# Patient Record
Sex: MALE | Race: WHITE | NOT HISPANIC OR LATINO
[De-identification: names, ages, dates, MRNs, and addresses within clinical notes are randomized per-mention and may not be internally consistent; named-entity substitution may affect disease eponyms.]

---

## 2017-02-23 ENCOUNTER — RX RENEWAL (OUTPATIENT)
Age: 77
End: 2017-02-23

## 2017-04-22 ENCOUNTER — RX RENEWAL (OUTPATIENT)
Age: 77
End: 2017-04-22

## 2017-04-27 ENCOUNTER — APPOINTMENT (OUTPATIENT)
Dept: INTERNAL MEDICINE | Facility: CLINIC | Age: 77
End: 2017-04-27

## 2017-04-27 VITALS — WEIGHT: 195 LBS | BODY MASS INDEX: 31.47 KG/M2

## 2017-04-28 LAB
25(OH)D3 SERPL-MCNC: 53.9 NG/ML
ALBUMIN SERPL ELPH-MCNC: 4.4 G/DL
ALP BLD-CCNC: 68 U/L
ALT SERPL-CCNC: 11 U/L
ANION GAP SERPL CALC-SCNC: 15 MMOL/L
AST SERPL-CCNC: 8 U/L
BASOPHILS # BLD AUTO: 0.06 K/UL
BASOPHILS NFR BLD AUTO: 0.8 %
BILIRUB SERPL-MCNC: 0.3 MG/DL
BUN SERPL-MCNC: 36 MG/DL
CALCIUM SERPL-MCNC: 9.5 MG/DL
CHLORIDE SERPL-SCNC: 99 MMOL/L
CHOLEST SERPL-MCNC: 123 MG/DL
CHOLEST/HDLC SERPL: 3.6 RATIO
CO2 SERPL-SCNC: 30 MMOL/L
CREAT SERPL-MCNC: 1.46 MG/DL
EOSINOPHIL # BLD AUTO: 0.3 K/UL
EOSINOPHIL NFR BLD AUTO: 4.1 %
GLUCOSE SERPL-MCNC: 118 MG/DL
HBA1C MFR BLD HPLC: 6.7 %
HCT VFR BLD CALC: 40 %
HDLC SERPL-MCNC: 34 MG/DL
HGB BLD-MCNC: 12.2 G/DL
IMM GRANULOCYTES NFR BLD AUTO: 0.1 %
LDLC SERPL CALC-MCNC: 48 MG/DL
LYMPHOCYTES # BLD AUTO: 2.08 K/UL
LYMPHOCYTES NFR BLD AUTO: 28.4 %
MAN DIFF?: NORMAL
MCHC RBC-ENTMCNC: 28 PG
MCHC RBC-ENTMCNC: 30.5 GM/DL
MCV RBC AUTO: 92 FL
MONOCYTES # BLD AUTO: 0.6 K/UL
MONOCYTES NFR BLD AUTO: 8.2 %
NEUTROPHILS # BLD AUTO: 4.28 K/UL
NEUTROPHILS NFR BLD AUTO: 58.4 %
PLATELET # BLD AUTO: 183 K/UL
POTASSIUM SERPL-SCNC: 4.2 MMOL/L
PROT SERPL-MCNC: 6.7 G/DL
RBC # BLD: 4.35 M/UL
RBC # FLD: 13.9 %
SODIUM SERPL-SCNC: 144 MMOL/L
TRIGL SERPL-MCNC: 207 MG/DL
URATE SERPL-MCNC: 6.8 MG/DL
WBC # FLD AUTO: 7.33 K/UL

## 2017-04-29 ENCOUNTER — RX RENEWAL (OUTPATIENT)
Age: 77
End: 2017-04-29

## 2017-05-10 ENCOUNTER — RX RENEWAL (OUTPATIENT)
Age: 77
End: 2017-05-10

## 2017-05-30 ENCOUNTER — APPOINTMENT (OUTPATIENT)
Dept: INTERNAL MEDICINE | Facility: CLINIC | Age: 77
End: 2017-05-30

## 2017-05-30 VITALS
RESPIRATION RATE: 16 BRPM | TEMPERATURE: 98.2 F | DIASTOLIC BLOOD PRESSURE: 74 MMHG | HEART RATE: 66 BPM | SYSTOLIC BLOOD PRESSURE: 142 MMHG

## 2017-05-30 VITALS — BODY MASS INDEX: 32.28 KG/M2 | WEIGHT: 200 LBS

## 2017-05-30 DIAGNOSIS — G62.9 POLYNEUROPATHY, UNSPECIFIED: ICD-10-CM

## 2017-05-31 LAB
ALBUMIN SERPL ELPH-MCNC: 4.3 G/DL
ALP BLD-CCNC: 78 U/L
ALT SERPL-CCNC: 15 U/L
ANION GAP SERPL CALC-SCNC: 16 MMOL/L
AST SERPL-CCNC: 11 U/L
BILIRUB SERPL-MCNC: 0.2 MG/DL
BUN SERPL-MCNC: 32 MG/DL
CALCIUM SERPL-MCNC: 9.5 MG/DL
CHLORIDE SERPL-SCNC: 103 MMOL/L
CO2 SERPL-SCNC: 27 MMOL/L
CREAT SERPL-MCNC: 1.38 MG/DL
GLUCOSE SERPL-MCNC: 99 MG/DL
HBA1C MFR BLD HPLC: 7.2 %
POTASSIUM SERPL-SCNC: 4.3 MMOL/L
PROT SERPL-MCNC: 7.3 G/DL
SODIUM SERPL-SCNC: 146 MMOL/L

## 2017-07-12 ENCOUNTER — TRANSCRIPTION ENCOUNTER (OUTPATIENT)
Age: 77
End: 2017-07-12

## 2017-07-12 ENCOUNTER — APPOINTMENT (OUTPATIENT)
Dept: INTERNAL MEDICINE | Facility: CLINIC | Age: 77
End: 2017-07-12

## 2017-07-12 VITALS
BODY MASS INDEX: 32.12 KG/M2 | SYSTOLIC BLOOD PRESSURE: 132 MMHG | TEMPERATURE: 96.5 F | HEART RATE: 66 BPM | DIASTOLIC BLOOD PRESSURE: 78 MMHG | RESPIRATION RATE: 16 BRPM | WEIGHT: 199 LBS

## 2017-07-28 ENCOUNTER — APPOINTMENT (OUTPATIENT)
Dept: INTERNAL MEDICINE | Facility: CLINIC | Age: 77
End: 2017-07-28
Payer: MEDICARE

## 2017-07-28 VITALS — WEIGHT: 199.51 LBS | BODY MASS INDEX: 32.2 KG/M2

## 2017-07-28 VITALS
SYSTOLIC BLOOD PRESSURE: 128 MMHG | HEART RATE: 64 BPM | RESPIRATION RATE: 16 BRPM | TEMPERATURE: 97.6 F | DIASTOLIC BLOOD PRESSURE: 76 MMHG

## 2017-07-28 PROCEDURE — 36415 COLL VENOUS BLD VENIPUNCTURE: CPT

## 2017-07-28 PROCEDURE — 99215 OFFICE O/P EST HI 40 MIN: CPT | Mod: 25

## 2017-07-29 LAB
25(OH)D3 SERPL-MCNC: 49.4 NG/ML
ALBUMIN SERPL ELPH-MCNC: 4.2 G/DL
ALP BLD-CCNC: 64 U/L
ALT SERPL-CCNC: 19 U/L
ANION GAP SERPL CALC-SCNC: 15 MMOL/L
AST SERPL-CCNC: 18 U/L
BASOPHILS # BLD AUTO: 0.03 K/UL
BASOPHILS NFR BLD AUTO: 0.4 %
BILIRUB SERPL-MCNC: 0.4 MG/DL
BUN SERPL-MCNC: 35 MG/DL
CALCIUM SERPL-MCNC: 8.7 MG/DL
CHLORIDE SERPL-SCNC: 103 MMOL/L
CHOLEST SERPL-MCNC: 117 MG/DL
CHOLEST/HDLC SERPL: 3.4 RATIO
CO2 SERPL-SCNC: 27 MMOL/L
CREAT SERPL-MCNC: 1.49 MG/DL
CREAT SPEC-SCNC: 59 MG/DL
EOSINOPHIL # BLD AUTO: 0.3 K/UL
EOSINOPHIL NFR BLD AUTO: 4.3 %
GLUCOSE SERPL-MCNC: 119 MG/DL
HBA1C MFR BLD HPLC: 6.7 %
HCT VFR BLD CALC: 38.8 %
HDLC SERPL-MCNC: 34 MG/DL
HGB BLD-MCNC: 12.1 G/DL
IMM GRANULOCYTES NFR BLD AUTO: 0.3 %
LDLC SERPL CALC-MCNC: 47 MG/DL
LYMPHOCYTES # BLD AUTO: 2.1 K/UL
LYMPHOCYTES NFR BLD AUTO: 30.3 %
MAN DIFF?: NORMAL
MCHC RBC-ENTMCNC: 28.2 PG
MCHC RBC-ENTMCNC: 31.2 GM/DL
MCV RBC AUTO: 90.4 FL
MICROALBUMIN 24H UR DL<=1MG/L-MCNC: 0.5 MG/DL
MICROALBUMIN/CREAT 24H UR-RTO: 8 MG/G
MONOCYTES # BLD AUTO: 0.4 K/UL
MONOCYTES NFR BLD AUTO: 5.8 %
NEUTROPHILS # BLD AUTO: 4.09 K/UL
NEUTROPHILS NFR BLD AUTO: 58.9 %
PLATELET # BLD AUTO: 182 K/UL
POTASSIUM SERPL-SCNC: 4.2 MMOL/L
PROT SERPL-MCNC: 7.2 G/DL
RBC # BLD: 4.29 M/UL
RBC # FLD: 13.7 %
SODIUM SERPL-SCNC: 145 MMOL/L
TRIGL SERPL-MCNC: 181 MG/DL
URATE SERPL-MCNC: 6.1 MG/DL
WBC # FLD AUTO: 6.94 K/UL

## 2017-10-27 ENCOUNTER — APPOINTMENT (OUTPATIENT)
Dept: INTERNAL MEDICINE | Facility: CLINIC | Age: 77
End: 2017-10-27

## 2017-11-07 ENCOUNTER — APPOINTMENT (OUTPATIENT)
Dept: INTERNAL MEDICINE | Facility: CLINIC | Age: 77
End: 2017-11-07
Payer: MEDICARE

## 2017-11-07 VITALS — WEIGHT: 196 LBS | BODY MASS INDEX: 31.64 KG/M2

## 2017-11-07 VITALS
SYSTOLIC BLOOD PRESSURE: 128 MMHG | DIASTOLIC BLOOD PRESSURE: 72 MMHG | HEART RATE: 68 BPM | TEMPERATURE: 98.3 F | RESPIRATION RATE: 16 BRPM

## 2017-11-07 DIAGNOSIS — Z23 ENCOUNTER FOR IMMUNIZATION: ICD-10-CM

## 2017-11-07 DIAGNOSIS — M54.12 RADICULOPATHY, CERVICAL REGION: ICD-10-CM

## 2017-11-07 LAB
BILIRUB UR QL STRIP: NEGATIVE
GLUCOSE UR-MCNC: NEGATIVE
HCG UR QL: 0.2 EU/DL
HGB UR QL STRIP.AUTO: NORMAL
KETONES UR-MCNC: NEGATIVE
LEUKOCYTE ESTERASE UR QL STRIP: NEGATIVE
NITRITE UR QL STRIP: NEGATIVE
PH UR STRIP: 5.5
PROT UR STRIP-MCNC: NEGATIVE
SP GR UR STRIP: 1

## 2017-11-07 PROCEDURE — G0008: CPT

## 2017-11-07 PROCEDURE — 81003 URINALYSIS AUTO W/O SCOPE: CPT | Mod: QW

## 2017-11-07 PROCEDURE — G0439: CPT

## 2017-11-07 PROCEDURE — 90662 IIV NO PRSV INCREASED AG IM: CPT

## 2017-11-07 PROCEDURE — 36415 COLL VENOUS BLD VENIPUNCTURE: CPT

## 2017-11-07 PROCEDURE — 99215 OFFICE O/P EST HI 40 MIN: CPT | Mod: 25

## 2017-11-08 LAB
25(OH)D3 SERPL-MCNC: 62.8 NG/ML
ALBUMIN SERPL ELPH-MCNC: 4.3 G/DL
ALP BLD-CCNC: 67 U/L
ALT SERPL-CCNC: 11 U/L
ANION GAP SERPL CALC-SCNC: 14 MMOL/L
AST SERPL-CCNC: 16 U/L
BASOPHILS # BLD AUTO: 0.03 K/UL
BASOPHILS NFR BLD AUTO: 0.5 %
BILIRUB SERPL-MCNC: 0.3 MG/DL
BUN SERPL-MCNC: 43 MG/DL
CALCIUM SERPL-MCNC: 9.2 MG/DL
CHLORIDE SERPL-SCNC: 99 MMOL/L
CHOLEST SERPL-MCNC: 120 MG/DL
CHOLEST/HDLC SERPL: 3.8 RATIO
CO2 SERPL-SCNC: 28 MMOL/L
CREAT SERPL-MCNC: 1.66 MG/DL
CREAT SPEC-SCNC: 40 MG/DL
EOSINOPHIL # BLD AUTO: 0.3 K/UL
EOSINOPHIL NFR BLD AUTO: 4.7 %
GLUCOSE SERPL-MCNC: 126 MG/DL
HBA1C MFR BLD HPLC: 6.4 %
HCT VFR BLD CALC: 39.6 %
HDLC SERPL-MCNC: 32 MG/DL
HGB BLD-MCNC: 12.4 G/DL
IMM GRANULOCYTES NFR BLD AUTO: 0.2 %
LDLC SERPL CALC-MCNC: 41 MG/DL
LYMPHOCYTES # BLD AUTO: 1.77 K/UL
LYMPHOCYTES NFR BLD AUTO: 27.7 %
MAN DIFF?: NORMAL
MCHC RBC-ENTMCNC: 28.4 PG
MCHC RBC-ENTMCNC: 31.3 GM/DL
MCV RBC AUTO: 90.6 FL
MICROALBUMIN 24H UR DL<=1MG/L-MCNC: 1.1 MG/DL
MICROALBUMIN/CREAT 24H UR-RTO: 28 MG/G
MONOCYTES # BLD AUTO: 0.52 K/UL
MONOCYTES NFR BLD AUTO: 8.1 %
NEUTROPHILS # BLD AUTO: 3.77 K/UL
NEUTROPHILS NFR BLD AUTO: 58.8 %
PLATELET # BLD AUTO: 178 K/UL
POTASSIUM SERPL-SCNC: 4.5 MMOL/L
PROT SERPL-MCNC: 7.3 G/DL
RBC # BLD: 4.37 M/UL
RBC # FLD: 13.7 %
SODIUM SERPL-SCNC: 141 MMOL/L
TRIGL SERPL-MCNC: 234 MG/DL
URATE SERPL-MCNC: 7.1 MG/DL
WBC # FLD AUTO: 6.4 K/UL

## 2017-11-14 ENCOUNTER — MEDICATION RENEWAL (OUTPATIENT)
Age: 77
End: 2017-11-14

## 2018-01-11 ENCOUNTER — RX RENEWAL (OUTPATIENT)
Age: 78
End: 2018-01-11

## 2018-02-07 ENCOUNTER — APPOINTMENT (OUTPATIENT)
Dept: INTERNAL MEDICINE | Facility: CLINIC | Age: 78
End: 2018-02-07
Payer: MEDICARE

## 2018-02-07 VITALS
TEMPERATURE: 98.3 F | RESPIRATION RATE: 16 BRPM | HEART RATE: 70 BPM | DIASTOLIC BLOOD PRESSURE: 72 MMHG | SYSTOLIC BLOOD PRESSURE: 128 MMHG

## 2018-02-07 VITALS — WEIGHT: 190 LBS | BODY MASS INDEX: 30.67 KG/M2

## 2018-02-07 DIAGNOSIS — I83.813 VARICOSE VEINS OF BILATERAL LOWER EXTREMITIES WITH PAIN: ICD-10-CM

## 2018-02-07 DIAGNOSIS — M54.32 SCIATICA, LEFT SIDE: ICD-10-CM

## 2018-02-07 DIAGNOSIS — M79.652 PAIN IN LEFT THIGH: ICD-10-CM

## 2018-02-07 LAB
BILIRUB UR QL STRIP: NEGATIVE
GLUCOSE UR-MCNC: NEGATIVE
HCG UR QL: 0.2 EU/DL
HGB UR QL STRIP.AUTO: NEGATIVE
KETONES UR-MCNC: NEGATIVE
LEUKOCYTE ESTERASE UR QL STRIP: NEGATIVE
NITRITE UR QL STRIP: NEGATIVE
PH UR STRIP: 5.5
PROT UR STRIP-MCNC: NEGATIVE
SP GR UR STRIP: 1.01

## 2018-02-07 PROCEDURE — 81003 URINALYSIS AUTO W/O SCOPE: CPT | Mod: QW

## 2018-02-07 PROCEDURE — 99215 OFFICE O/P EST HI 40 MIN: CPT | Mod: 25

## 2018-02-07 PROCEDURE — 36415 COLL VENOUS BLD VENIPUNCTURE: CPT

## 2018-02-07 RX ORDER — HYDROCODONE BITARTRATE AND ACETAMINOPHEN 5; 325 MG/1; MG/1
5-325 TABLET ORAL
Qty: 60 | Refills: 0 | Status: DISCONTINUED | COMMUNITY
Start: 2017-07-28 | End: 2018-02-07

## 2018-02-08 LAB
25(OH)D3 SERPL-MCNC: 71.7 NG/ML
ALBUMIN SERPL ELPH-MCNC: 4.2 G/DL
ALP BLD-CCNC: 56 U/L
ALT SERPL-CCNC: 16 U/L
ANION GAP SERPL CALC-SCNC: 14 MMOL/L
AST SERPL-CCNC: 14 U/L
BASOPHILS # BLD AUTO: 0.03 K/UL
BASOPHILS NFR BLD AUTO: 0.5 %
BILIRUB SERPL-MCNC: 0.4 MG/DL
BUN SERPL-MCNC: 40 MG/DL
CALCIUM SERPL-MCNC: 9.4 MG/DL
CHLORIDE SERPL-SCNC: 102 MMOL/L
CHOLEST SERPL-MCNC: 117 MG/DL
CHOLEST/HDLC SERPL: 3.3 RATIO
CO2 SERPL-SCNC: 28 MMOL/L
CREAT SERPL-MCNC: 1.78 MG/DL
CREAT SPEC-SCNC: 97 MG/DL
EOSINOPHIL # BLD AUTO: 0.28 K/UL
EOSINOPHIL NFR BLD AUTO: 4.2 %
GLUCOSE SERPL-MCNC: 119 MG/DL
HBA1C MFR BLD HPLC: 6.4 %
HCT VFR BLD CALC: 40.6 %
HDLC SERPL-MCNC: 36 MG/DL
HGB BLD-MCNC: 11.9 G/DL
IMM GRANULOCYTES NFR BLD AUTO: 0.2 %
LDLC SERPL CALC-MCNC: 60 MG/DL
LYMPHOCYTES # BLD AUTO: 1.77 K/UL
LYMPHOCYTES NFR BLD AUTO: 26.8 %
MAN DIFF?: NORMAL
MCHC RBC-ENTMCNC: 27.6 PG
MCHC RBC-ENTMCNC: 29.3 GM/DL
MCV RBC AUTO: 94.2 FL
MICROALBUMIN 24H UR DL<=1MG/L-MCNC: 1.8 MG/DL
MICROALBUMIN/CREAT 24H UR-RTO: 19 MG/G
MONOCYTES # BLD AUTO: 0.3 K/UL
MONOCYTES NFR BLD AUTO: 4.5 %
NEUTROPHILS # BLD AUTO: 4.21 K/UL
NEUTROPHILS NFR BLD AUTO: 63.8 %
PLATELET # BLD AUTO: 167 K/UL
POTASSIUM SERPL-SCNC: 4.2 MMOL/L
PROT SERPL-MCNC: 7 G/DL
RBC # BLD: 4.31 M/UL
RBC # FLD: 14.1 %
SODIUM SERPL-SCNC: 144 MMOL/L
TRIGL SERPL-MCNC: 107 MG/DL
URATE SERPL-MCNC: 6.6 MG/DL
WBC # FLD AUTO: 6.6 K/UL

## 2018-02-13 ENCOUNTER — RX RENEWAL (OUTPATIENT)
Age: 78
End: 2018-02-13

## 2018-04-14 ENCOUNTER — RX RENEWAL (OUTPATIENT)
Age: 78
End: 2018-04-14

## 2018-04-23 ENCOUNTER — RX RENEWAL (OUTPATIENT)
Age: 78
End: 2018-04-23

## 2018-04-25 ENCOUNTER — RX RENEWAL (OUTPATIENT)
Age: 78
End: 2018-04-25

## 2018-05-07 ENCOUNTER — APPOINTMENT (OUTPATIENT)
Dept: INTERNAL MEDICINE | Facility: CLINIC | Age: 78
End: 2018-05-07

## 2018-06-09 ENCOUNTER — RX RENEWAL (OUTPATIENT)
Age: 78
End: 2018-06-09

## 2018-06-14 ENCOUNTER — RX RENEWAL (OUTPATIENT)
Age: 78
End: 2018-06-14

## 2018-06-16 ENCOUNTER — RX RENEWAL (OUTPATIENT)
Age: 78
End: 2018-06-16

## 2018-06-23 ENCOUNTER — RX RENEWAL (OUTPATIENT)
Age: 78
End: 2018-06-23

## 2018-07-16 ENCOUNTER — APPOINTMENT (OUTPATIENT)
Dept: INTERNAL MEDICINE | Facility: CLINIC | Age: 78
End: 2018-07-16
Payer: MEDICARE

## 2018-07-16 VITALS
RESPIRATION RATE: 14 BRPM | HEART RATE: 72 BPM | DIASTOLIC BLOOD PRESSURE: 72 MMHG | WEIGHT: 189 LBS | BODY MASS INDEX: 30.51 KG/M2 | SYSTOLIC BLOOD PRESSURE: 120 MMHG | TEMPERATURE: 97.6 F

## 2018-07-16 DIAGNOSIS — I73.9 PERIPHERAL VASCULAR DISEASE, UNSPECIFIED: ICD-10-CM

## 2018-07-16 PROCEDURE — 99215 OFFICE O/P EST HI 40 MIN: CPT | Mod: 25

## 2018-07-16 PROCEDURE — 36415 COLL VENOUS BLD VENIPUNCTURE: CPT

## 2018-07-16 NOTE — ASSESSMENT
[FreeTextEntry1] : Diagnoses #1 hypertension,stable . Patient is advised to continue current medical treatment .\par #2 adult-onset diabetes.same treatment  and diet\par #3 Chronic renal insufficiency.cmp to lab\par #4 hyperlipidemia.To Continue same treatment and low fat diet\par #5 rule out liver toxicity due to chronic medication\par #6 low vitamin D, being supplemented\par #7 arthritis of left hip .Patient to think about operation  \par #8 bilateral ears cerumen .the patient is going to see his ENT \par #9 edema of feet due to  obesity, varicose veins and use  flomax. Patient reassured\par Plan .Patient to continue current medical treatment and low-salt ,low-fat, diabetic diet.  Return to the office after 2  months [Mediterranean diet recommended] : Mediterranean diet recommended

## 2018-07-16 NOTE — HISTORY OF PRESENT ILLNESS
[FreeTextEntry1] : Patient  comes for followup of his Chronic medical problems like hypertension, CRF , hyperlipidemia, , rule out liver toxicity ,AODM and  low vitamin D .Patient feels well without any chest pain, weakness, shortness of breath, paroxysmal nocturnal dyspnea, orthopnea.only with known  left hip  pain when he walks.Also patient has swelling of his lower legs and feet for several weeks.

## 2018-07-16 NOTE — PHYSICAL EXAM
[General Appearance - Alert] : alert [General Appearance - In No Acute Distress] : in no acute distress [General Appearance - Well Nourished] : well nourished [General Appearance - Well Developed] : well developed [General Appearance - Well-Appearing] : healthy appearing [Sclera] : the sclera and conjunctiva were normal [Extraocular Movements] : extraocular movements were intact [Strabismus] : no strabismus was seen [Optic Disc Abnormality] : the optic disc were normal in size and color [Outer Ear] : the ears and nose were normal in appearance [Examination Of The Oral Cavity] : the lips and gums were normal [Both Tympanic Membranes Were Examined] : both tympanic membranes were normal [Nasal Cavity] : the nasal mucosa and septum were normal [Oropharynx] : the oropharynx was normal [Neck Appearance] : the appearance of the neck was normal [Neck Cervical Mass (___cm)] : no neck mass was observed [Jugular Venous Distention Increased] : there was no jugular-venous distention [Thyroid Diffuse Enlargement] : the thyroid was not enlarged [Thyroid Nodule] : there were no palpable thyroid nodules [Respiration, Rhythm And Depth] : normal respiratory rhythm and effort [Auscultation Breath Sounds / Voice Sounds] : lungs were clear to auscultation bilaterally [Chest Palpation] : palpation of the chest revealed no abnormalities [Lungs Percussion] : the lungs were normal to percussion [Apical Impulse] : the apical impulse was normal [Heart Rate And Rhythm] : heart rate was normal and rhythm regular [Heart Sounds] : normal S1 and S2 [Heart Sounds Gallop] : no gallops [Murmurs] : no murmurs [Heart Sounds Pericardial Friction Rub] : no pericardial rub [Arterial Pulses Carotid] : carotid pulses were normal with no bruits [Abdominal Aorta] : the abdominal aorta was normal [Full Pulse] : the pedal pulses are present [Veins - Varicosity Changes] : there were no varicosital changes [Breast Appearance] : normal in appearance [Breast Palpation Mass] : no palpable masses [Breast Abnormal Lactation (Galactorrhea)] : no nipple discharge [Bowel Sounds] : normal bowel sounds [Abdomen Soft] : soft [Abdomen Tenderness] : non-tender [Abdomen Mass (___ Cm)] : no abdominal mass palpated [Cervical Lymph Nodes Enlarged Posterior Bilaterally] : posterior cervical [Cervical Lymph Nodes Enlarged Anterior Bilaterally] : anterior cervical [Supraclavicular Lymph Nodes Enlarged Bilaterally] : supraclavicular [Axillary Lymph Nodes Enlarged Bilaterally] : axillary [Femoral Lymph Nodes Enlarged Bilaterally] : femoral [Inguinal Lymph Nodes Enlarged Bilaterally] : inguinal [No CVA Tenderness] : no ~M costovertebral angle tenderness [No Spinal Tenderness] : no spinal tenderness [Abnormal Walk] : normal gait [Nail Clubbing] : no clubbing  or cyanosis of the fingernails [Musculoskeletal - Swelling] : no joint swelling seen [Motor Tone] : muscle strength and tone were normal [FreeTextEntry1] : pain -stifness of l hip ,external rotation [Skin Color & Pigmentation] : normal skin color and pigmentation [Skin Turgor] : normal skin turgor [] : no rash [Skin Lesions] : no skin lesions [Right Foot Was Examined] : right foot was examined [Left Foot Was Examined] : left foot was examined [Normal Appearance] : normal in appearance [Tenderness] : not tender [Erythema] : not erythematous [Normal in Appearance] : normal in appearance [Swelling] : not swollen [Normal] : normal [Deep Tendon Reflexes (DTR)] : deep tendon reflexes were 2+ and symmetric [Sensation] : the sensory exam was normal to light touch and pinprick [Motor Exam] : the motor exam was normal [No Focal Deficits] : no focal deficits [Oriented To Time, Place, And Person] : oriented to person, place, and time [Impaired Insight] : insight and judgment were intact [Affect] : the affect was normal [Mood] : the mood was normal [Over the Past 2 Weeks, Have You Felt Down, Depressed, or Hopeless?] : 1.) Over the past 2 weeks, have you felt down, depressed, or hopeless? No [Over the Past 2 Weeks, Have You Felt Little Interest or Pleasure Doing Things?] : 2.) Over the past 2 weeks, have you felt little interest or pleasure doing things? No

## 2018-07-17 LAB
25(OH)D3 SERPL-MCNC: 65.8 NG/ML
ALBUMIN SERPL ELPH-MCNC: 4.4 G/DL
ALP BLD-CCNC: 57 U/L
ALT SERPL-CCNC: 15 U/L
ANION GAP SERPL CALC-SCNC: 16 MMOL/L
AST SERPL-CCNC: 12 U/L
BASOPHILS # BLD AUTO: 0.03 K/UL
BASOPHILS NFR BLD AUTO: 0.5 %
BILIRUB SERPL-MCNC: 0.4 MG/DL
BUN SERPL-MCNC: 33 MG/DL
CALCIUM SERPL-MCNC: 9.1 MG/DL
CHLORIDE SERPL-SCNC: 99 MMOL/L
CHOLEST SERPL-MCNC: 101 MG/DL
CHOLEST/HDLC SERPL: 3.1 RATIO
CO2 SERPL-SCNC: 28 MMOL/L
CREAT SERPL-MCNC: 1.69 MG/DL
EOSINOPHIL # BLD AUTO: 0.28 K/UL
EOSINOPHIL NFR BLD AUTO: 4.4 %
GLUCOSE SERPL-MCNC: 105 MG/DL
HBA1C MFR BLD HPLC: 6.5 %
HCT VFR BLD CALC: 37.5 %
HDLC SERPL-MCNC: 33 MG/DL
HGB BLD-MCNC: 11.6 G/DL
IMM GRANULOCYTES NFR BLD AUTO: 0.3 %
LDLC SERPL CALC-MCNC: 42 MG/DL
LYMPHOCYTES # BLD AUTO: 2.01 K/UL
LYMPHOCYTES NFR BLD AUTO: 31.6 %
MAN DIFF?: NORMAL
MCHC RBC-ENTMCNC: 29 PG
MCHC RBC-ENTMCNC: 30.9 GM/DL
MCV RBC AUTO: 93.8 FL
MONOCYTES # BLD AUTO: 0.43 K/UL
MONOCYTES NFR BLD AUTO: 6.8 %
NEUTROPHILS # BLD AUTO: 3.59 K/UL
NEUTROPHILS NFR BLD AUTO: 56.4 %
PLATELET # BLD AUTO: 176 K/UL
POTASSIUM SERPL-SCNC: 4.3 MMOL/L
PROT SERPL-MCNC: 6.5 G/DL
RBC # BLD: 4 M/UL
RBC # FLD: 14.3 %
SODIUM SERPL-SCNC: 143 MMOL/L
TRIGL SERPL-MCNC: 128 MG/DL
URATE SERPL-MCNC: 6.3 MG/DL
WBC # FLD AUTO: 6.36 K/UL

## 2018-07-18 ENCOUNTER — RX RENEWAL (OUTPATIENT)
Age: 78
End: 2018-07-18

## 2018-07-21 ENCOUNTER — RX RENEWAL (OUTPATIENT)
Age: 78
End: 2018-07-21

## 2018-08-01 ENCOUNTER — RX RENEWAL (OUTPATIENT)
Age: 78
End: 2018-08-01

## 2018-08-02 ENCOUNTER — RX RENEWAL (OUTPATIENT)
Age: 78
End: 2018-08-02

## 2018-08-09 ENCOUNTER — RX RENEWAL (OUTPATIENT)
Age: 78
End: 2018-08-09

## 2018-09-28 ENCOUNTER — RX RENEWAL (OUTPATIENT)
Age: 78
End: 2018-09-28

## 2018-10-17 ENCOUNTER — APPOINTMENT (OUTPATIENT)
Dept: INTERNAL MEDICINE | Facility: CLINIC | Age: 78
End: 2018-10-17

## 2018-11-06 ENCOUNTER — LABORATORY RESULT (OUTPATIENT)
Age: 78
End: 2018-11-06

## 2018-11-06 ENCOUNTER — APPOINTMENT (OUTPATIENT)
Dept: INTERNAL MEDICINE | Facility: CLINIC | Age: 78
End: 2018-11-06
Payer: MEDICARE

## 2018-11-06 VITALS
BODY MASS INDEX: 30.37 KG/M2 | OXYGEN SATURATION: 97 % | HEART RATE: 68 BPM | WEIGHT: 189 LBS | HEIGHT: 66 IN | DIASTOLIC BLOOD PRESSURE: 82 MMHG | SYSTOLIC BLOOD PRESSURE: 132 MMHG | RESPIRATION RATE: 16 BRPM

## 2018-11-06 LAB
BILIRUB UR QL STRIP: NEGATIVE
CLARITY UR: CLEAR
COLLECTION METHOD: NORMAL
GLUCOSE UR-MCNC: NEGATIVE
HCG UR QL: 0.2 EU/DL
HGB UR QL STRIP.AUTO: NORMAL
KETONES UR-MCNC: NEGATIVE
LEUKOCYTE ESTERASE UR QL STRIP: NEGATIVE
NITRITE UR QL STRIP: NEGATIVE
PH UR STRIP: 6.5
PROT UR STRIP-MCNC: NEGATIVE
SP GR UR STRIP: 1.02

## 2018-11-06 PROCEDURE — 81003 URINALYSIS AUTO W/O SCOPE: CPT | Mod: QW

## 2018-11-06 PROCEDURE — 36415 COLL VENOUS BLD VENIPUNCTURE: CPT

## 2018-11-06 PROCEDURE — 99213 OFFICE O/P EST LOW 20 MIN: CPT | Mod: 25

## 2018-11-07 NOTE — HISTORY OF PRESENT ILLNESS
[FreeTextEntry8] : Patient presents for evaluation of one episode of hemoptysis and one episode of gross hematuria. Patient states he coughs everyday after removing appliance he wears at night. Patient usually coughs and yesterday he had one episode where he coughed up some blood.  Patient denies any fevers or chills. Patient denies any preceding URI's. Patient denies any smoking history. Patient takes baby aspirin. Patient denies taking coumadin or other blood thinners.

## 2018-11-07 NOTE — ASSESSMENT
[FreeTextEntry1] : 1) Hemoptysis: May be from bronchitis, chronic cough. Patient is not smoker and denies taking any blood thinners. Will check chest X-ray and evaluate liver function\par 2) Gross hematuria: Advised to go to urologist for further evaluation, will check kidney function, and liver function. May consider Goodpasture's syndrome given hematuria and hemoptysis. Will check UA and culture.

## 2018-11-09 ENCOUNTER — MESSAGE (OUTPATIENT)
Age: 78
End: 2018-11-09

## 2018-11-09 LAB
ALBUMIN SERPL ELPH-MCNC: 4.3 G/DL
ALP BLD-CCNC: 62 U/L
ALT SERPL-CCNC: 16 U/L
ANION GAP SERPL CALC-SCNC: 13 MMOL/L
APPEARANCE: ABNORMAL
APPEARANCE: ABNORMAL
AST SERPL-CCNC: 10 U/L
BACTERIA: NEGATIVE
BASOPHILS # BLD AUTO: 0.04 K/UL
BASOPHILS NFR BLD AUTO: 0.6 %
BILIRUB SERPL-MCNC: 0.5 MG/DL
BILIRUBIN URINE: NEGATIVE
BILIRUBIN URINE: NEGATIVE
BLOOD URINE: ABNORMAL
BLOOD URINE: ABNORMAL
BUN SERPL-MCNC: 39 MG/DL
CALCIUM SERPL-MCNC: 9.8 MG/DL
CHLORIDE SERPL-SCNC: 101 MMOL/L
CO2 SERPL-SCNC: 29 MMOL/L
COLOR: YELLOW
COLOR: YELLOW
CREAT SERPL-MCNC: 1.42 MG/DL
EOSINOPHIL # BLD AUTO: 0.21 K/UL
EOSINOPHIL NFR BLD AUTO: 3.2 %
GLUCOSE QUALITATIVE U: NEGATIVE MG/DL
GLUCOSE QUALITATIVE U: NEGATIVE MG/DL
GLUCOSE SERPL-MCNC: 100 MG/DL
HCT VFR BLD CALC: 39 %
HGB BLD-MCNC: 12.4 G/DL
HYALINE CASTS: 2 /LPF
IMM GRANULOCYTES NFR BLD AUTO: 0.3 %
KETONES URINE: NEGATIVE
KETONES URINE: NEGATIVE
LEUKOCYTE ESTERASE URINE: NEGATIVE
LEUKOCYTE ESTERASE URINE: NEGATIVE
LYMPHOCYTES # BLD AUTO: 1.94 K/UL
LYMPHOCYTES NFR BLD AUTO: 29.7 %
MAN DIFF?: NORMAL
MCHC RBC-ENTMCNC: 28.3 PG
MCHC RBC-ENTMCNC: 31.8 GM/DL
MCV RBC AUTO: 89 FL
MICROSCOPIC-UA: NORMAL
MONOCYTES # BLD AUTO: 0.36 K/UL
MONOCYTES NFR BLD AUTO: 5.5 %
NEUTROPHILS # BLD AUTO: 3.97 K/UL
NEUTROPHILS NFR BLD AUTO: 60.7 %
NITRITE URINE: NEGATIVE
NITRITE URINE: NEGATIVE
PH URINE: 6.5
PH URINE: 6.5
PLATELET # BLD AUTO: 173 K/UL
POTASSIUM SERPL-SCNC: 4.3 MMOL/L
PROT SERPL-MCNC: 7 G/DL
PROTEIN URINE: NEGATIVE MG/DL
PROTEIN URINE: NEGATIVE MG/DL
RBC # BLD: 4.38 M/UL
RBC # FLD: 13.9 %
RED BLOOD CELLS URINE: 4 /HPF
SODIUM SERPL-SCNC: 143 MMOL/L
SPECIFIC GRAVITY URINE: 1.02
SPECIFIC GRAVITY URINE: 1.02
SQUAMOUS EPITHELIAL CELLS: 4 /HPF
URINE COMMENTS: NORMAL
UROBILINOGEN URINE: NEGATIVE MG/DL
UROBILINOGEN URINE: NEGATIVE MG/DL
WBC # FLD AUTO: 6.54 K/UL
WHITE BLOOD CELLS URINE: 5 /HPF

## 2018-12-28 ENCOUNTER — RX RENEWAL (OUTPATIENT)
Age: 78
End: 2018-12-28

## 2019-01-09 ENCOUNTER — RX RENEWAL (OUTPATIENT)
Age: 79
End: 2019-01-09

## 2019-01-09 RX ORDER — FEBUXOSTAT 40 MG/1
40 TABLET ORAL DAILY
Qty: 90 | Refills: 2 | Status: DISCONTINUED | COMMUNITY
Start: 2018-08-10 | End: 2019-01-09

## 2019-01-21 ENCOUNTER — RX RENEWAL (OUTPATIENT)
Age: 79
End: 2019-01-21

## 2019-01-23 ENCOUNTER — MESSAGE (OUTPATIENT)
Age: 79
End: 2019-01-23

## 2019-01-26 ENCOUNTER — EMERGENCY (EMERGENCY)
Facility: HOSPITAL | Age: 79
LOS: 1 days | Discharge: HOME | End: 2019-01-26
Attending: EMERGENCY MEDICINE
Payer: MEDICARE

## 2019-01-26 VITALS
TEMPERATURE: 98 F | OXYGEN SATURATION: 96 % | DIASTOLIC BLOOD PRESSURE: 91 MMHG | SYSTOLIC BLOOD PRESSURE: 173 MMHG | WEIGHT: 184.97 LBS | HEIGHT: 66 IN | HEART RATE: 63 BPM | RESPIRATION RATE: 18 BRPM

## 2019-01-26 LAB
ANION GAP SERPL CALC-SCNC: 13 MMOL/L — SIGNIFICANT CHANGE UP (ref 5–17)
APPEARANCE UR: ABNORMAL
BACTERIA # UR AUTO: NEGATIVE — SIGNIFICANT CHANGE UP
BILIRUB UR-MCNC: NEGATIVE — SIGNIFICANT CHANGE UP
BUN SERPL-MCNC: 25 MG/DL — HIGH (ref 7–23)
CALCIUM SERPL-MCNC: 9.6 MG/DL — SIGNIFICANT CHANGE UP (ref 8.4–10.5)
CHLORIDE SERPL-SCNC: 98 MMOL/L — SIGNIFICANT CHANGE UP (ref 96–108)
CO2 SERPL-SCNC: 27 MMOL/L — SIGNIFICANT CHANGE UP (ref 22–31)
COLOR SPEC: SIGNIFICANT CHANGE UP
CREAT SERPL-MCNC: 1.41 MG/DL — HIGH (ref 0.5–1.3)
DIFF PNL FLD: ABNORMAL
EPI CELLS # UR: 2 /HPF — SIGNIFICANT CHANGE UP
GLUCOSE SERPL-MCNC: 107 MG/DL — HIGH (ref 70–99)
GLUCOSE UR QL: NEGATIVE — SIGNIFICANT CHANGE UP
HCT VFR BLD CALC: 36.3 % — LOW (ref 39–50)
HGB BLD-MCNC: 12.6 G/DL — LOW (ref 13–17)
HYALINE CASTS # UR AUTO: 2 /LPF — SIGNIFICANT CHANGE UP (ref 0–2)
KETONES UR-MCNC: NEGATIVE — SIGNIFICANT CHANGE UP
LEUKOCYTE ESTERASE UR-ACNC: NEGATIVE — SIGNIFICANT CHANGE UP
MCHC RBC-ENTMCNC: 29.6 PG — SIGNIFICANT CHANGE UP (ref 27–34)
MCHC RBC-ENTMCNC: 34.8 GM/DL — SIGNIFICANT CHANGE UP (ref 32–36)
MCV RBC AUTO: 85 FL — SIGNIFICANT CHANGE UP (ref 80–100)
NITRITE UR-MCNC: NEGATIVE — SIGNIFICANT CHANGE UP
PH UR: 7.5 — SIGNIFICANT CHANGE UP (ref 5–8)
PLATELET # BLD AUTO: 167 K/UL — SIGNIFICANT CHANGE UP (ref 150–400)
POTASSIUM SERPL-MCNC: 3.7 MMOL/L — SIGNIFICANT CHANGE UP (ref 3.5–5.3)
POTASSIUM SERPL-SCNC: 3.7 MMOL/L — SIGNIFICANT CHANGE UP (ref 3.5–5.3)
PROT UR-MCNC: ABNORMAL
RBC # BLD: 4.26 M/UL — SIGNIFICANT CHANGE UP (ref 4.2–5.8)
RBC # FLD: 11.7 % — SIGNIFICANT CHANGE UP (ref 10.3–14.5)
RBC CASTS # UR COMP ASSIST: 1493 /HPF — HIGH (ref 0–4)
SODIUM SERPL-SCNC: 138 MMOL/L — SIGNIFICANT CHANGE UP (ref 135–145)
SP GR SPEC: 1.01 — LOW (ref 1.01–1.02)
UROBILINOGEN FLD QL: NEGATIVE — SIGNIFICANT CHANGE UP
WBC # BLD: 7.2 K/UL — SIGNIFICANT CHANGE UP (ref 3.8–10.5)
WBC # FLD AUTO: 7.2 K/UL — SIGNIFICANT CHANGE UP (ref 3.8–10.5)
WBC UR QL: 13 /HPF — HIGH (ref 0–5)

## 2019-01-26 PROCEDURE — 99284 EMERGENCY DEPT VISIT MOD MDM: CPT

## 2019-01-26 PROCEDURE — 80048 BASIC METABOLIC PNL TOTAL CA: CPT

## 2019-01-26 PROCEDURE — 85027 COMPLETE CBC AUTOMATED: CPT

## 2019-01-26 PROCEDURE — 81001 URINALYSIS AUTO W/SCOPE: CPT

## 2019-01-26 PROCEDURE — 99283 EMERGENCY DEPT VISIT LOW MDM: CPT

## 2019-01-26 NOTE — ED PROVIDER NOTE - PHYSICAL EXAMINATION
Constitutional: Elderly man seen sitting in bed in NAD  Eyes: PERRL  ENMT: MMM  Neck: Supple  Respiratory: CTAB, no rales, rhonchi or wheezes  Cardiovascular: RRR, no m/g/r  Gastrointestinal: +BS, soft, NT/ND  Neurological: AAOx3  Psychiatric: Appropriate affect and mood  Extremities: No LE edema

## 2019-01-26 NOTE — ED ADULT NURSE NOTE - OBJECTIVE STATEMENT
79 y/o male hx HTN, BPH presents to the ED from home c/o hematuria x 2 days. Pt states that began suddenly at night, not associated with abdominal pain or dysuria. Denies fever, chills, n/v, weakness, abd pain, diarrhea/constipation, numbness/tingling, lethargy, dizziness, weakness. Pt A&Ox3, in no respiratory distress, no CP, abd soft, nontender, nondistended, urine red in color, no clots noted. PT safety maintained with family at bedside, call bell within reach and bed in the lowest position.

## 2019-01-26 NOTE — ED PROVIDER NOTE - PROGRESS NOTE DETAILS
UA negative for infection. Cr 1.4, stable from outpatient records after speaking with PCP. Plan for d/c home. Has outpatient urology f/u on Monday. Spoke to Dr. Lara states cr is stable, pt has follow up on monday, bladder scanner stable w positive urination in ED. [id number]

## 2019-01-26 NOTE — ED PROVIDER NOTE - NSFOLLOWUPCLINICS_GEN_ALL_ED_FT
[facility] - Urology  Urology  [address], [address] [place] [address]  Phone: [phone]  Fax:   Follow Up Time:

## 2019-01-26 NOTE — ED PROVIDER NOTE - OBJECTIVE STATEMENT
79 yo M with PMH HTN, BPH p/w hematuria. Pt endorses that yesterday early morning he noticed blood in his urine. He does not notice it except when he urinates and it has not changed in nature. He had an episode of very mild blood in his urine a couple of weeks ago but it resolved within a day. Today, he denies any symptoms other than hematuria which is fruit punch colored. He denies any other urinary symptoms including pain on urination, burning, abd pain, flank pain, fever or chills, decreased or increased urination or difficulty starting or stopping a stream.

## 2019-01-26 NOTE — ED PROVIDER NOTE - NSFOLLOWUPINSTRUCTIONS_ED_ALL_ED_FT
You came to the hospital with blood in your urine. We ruled out an infection or any kidney damage. You should follow-up at your scheduled urology appointment on Monday or you can follow-up with our urologists to further evaluate your bleeding. You should return to the ER if you start to urinate thick blood  or start to have difficulty urinating.

## 2019-01-26 NOTE — ED PROVIDER NOTE - ATTENDING CONTRIBUTION TO CARE
****ATTENDING**** 77yo male hx HTN, BPH presents with hematuria. States since yesterday he has noticed blood tinged urine. Denies any pain, n/v, testicular pain. Pt has had hematuria in past that was self limiting. Denies any difficulty with urination. Pt spoke to his doctor and has an appt on Monday w urology but wanted to make sure he was okay till than.  On exam, Patient is awake,alert,oriented x 3. Patient is well appearing and in no acute distress. Patient's chest is clear to ausculation, +s1s2. Abdomen is soft nd/nt +BS. Extremity with no swelling or calf tenderness.  + hx smoking.  Pt w normal flow of urination, check labs, ua.

## 2019-01-26 NOTE — ED PROVIDER NOTE - NS ED ROS FT
General: Denies dizziness, fatigue  Eyes: Denies blurry vision  ENMT: Denies rhinorrhea  Respiratory: Denies cough, SOB  Cardiovascular: Denies palpitations, CP  Gastrointestinal: Denies abd pain, N/V/D/C  : +hematuria; denies dysuria, increased freq  Musculoskeletal: Denies edema, joint pain  Endocrine: Denies increased thirst, increased frequency  Allergic/Immunologic: Denies rashes or hives  Neuro: Denies weakness, numbness

## 2019-02-04 ENCOUNTER — APPOINTMENT (OUTPATIENT)
Dept: INTERNAL MEDICINE | Facility: CLINIC | Age: 79
End: 2019-02-04

## 2019-02-15 ENCOUNTER — NON-APPOINTMENT (OUTPATIENT)
Age: 79
End: 2019-02-15

## 2019-02-15 ENCOUNTER — APPOINTMENT (OUTPATIENT)
Dept: INTERNAL MEDICINE | Facility: CLINIC | Age: 79
End: 2019-02-15
Payer: MEDICARE

## 2019-02-15 VITALS
HEART RATE: 74 BPM | OXYGEN SATURATION: 95 % | DIASTOLIC BLOOD PRESSURE: 90 MMHG | WEIGHT: 189.15 LBS | SYSTOLIC BLOOD PRESSURE: 161 MMHG | HEIGHT: 64.96 IN | TEMPERATURE: 97.6 F | BODY MASS INDEX: 31.52 KG/M2

## 2019-02-15 PROCEDURE — 99214 OFFICE O/P EST MOD 30 MIN: CPT

## 2019-02-16 NOTE — PHYSICAL EXAM
[No Acute Distress] : no acute distress [Well Nourished] : well nourished [Well Developed] : well developed [Well-Appearing] : well-appearing [Normal Sclera/Conjunctiva] : normal sclera/conjunctiva [PERRL] : pupils equal round and reactive to light [EOMI] : extraocular movements intact [Normal Outer Ear/Nose] : the outer ears and nose were normal in appearance [Normal Oropharynx] : the oropharynx was normal [Normal TMs] : both tympanic membranes were normal [Normal Nasal Mucosa] : the nasal mucosa was normal [No JVD] : no jugular venous distention [Supple] : supple [No Lymphadenopathy] : no lymphadenopathy [Thyroid Normal, No Nodules] : the thyroid was normal and there were no nodules present [No Respiratory Distress] : no respiratory distress  [Clear to Auscultation] : lungs were clear to auscultation bilaterally [No Accessory Muscle Use] : no accessory muscle use [Normal Rate] : normal rate  [Regular Rhythm] : with a regular rhythm [Normal S1, S2] : normal S1 and S2 [No Murmur] : no murmur heard [No Carotid Bruits] : no carotid bruits [No Abdominal Bruit] : a ~M bruit was not heard ~T in the abdomen [No Varicosities] : no varicosities [Pedal Pulses Present] : the pedal pulses are present [No Edema] : there was no peripheral edema [No Extremity Clubbing/Cyanosis] : no extremity clubbing/cyanosis [No Palpable Aorta] : no palpable aorta [Soft] : abdomen soft [Non Tender] : non-tender [Non-distended] : non-distended [No Masses] : no abdominal mass palpated [No HSM] : no HSM [Normal Bowel Sounds] : normal bowel sounds [Normal Supraclavicular Nodes] : no supraclavicular lymphadenopathy [Normal Axillary Nodes] : no axillary lymphadenopathy [Normal Posterior Cervical Nodes] : no posterior cervical lymphadenopathy [Normal Anterior Cervical Nodes] : no anterior cervical lymphadenopathy [Normal Inguinal Nodes] : no inguinal lymphadenopathy [No CVA Tenderness] : no CVA  tenderness [No Spinal Tenderness] : no spinal tenderness [No Joint Swelling] : no joint swelling [Grossly Normal Strength/Tone] : grossly normal strength/tone [No Rash] : no rash [No Skin Lesions] : no skin lesions [Normal Gait] : normal gait [Coordination Grossly Intact] : coordination grossly intact [No Focal Deficits] : no focal deficits [Speech Grossly Normal] : speech grossly normal [Normal Affect] : the affect was normal [Normal Insight/Judgement] : insight and judgment were intact

## 2019-02-20 NOTE — ASSESSMENT
[High Risk Surgery - Intraperitoneal, Intrathoracic or Supringuinal Vascular Procedures] : High Risk Surgery - Intraperitoneal, Intrathoracic or Supringuinal Vascular Procedures - No (0) [Ischemic Heart Disease] : Ischemic Heart Disease - No (0) [Congestive Heart Failure] : Congestive Heart Failure - No (0) [Prior Cerebrovascular Accident or TIA] : Prior Cerebrovascular Accident or TIA - No (0) [Creatinine >= 2mg/dL (1 Point)] : Creatinine >= 2mg/dL - No (0) [Insulin-dependent Diabetic (1 Point)] : Insulin-dependent Diabetic - No (0) [0] : 0 , RCRI Class: I, Risk of Post-Op Cardiac Complications: 0.4%, Procedure Risk: Low-Risk [FreeTextEntry4] : Patient presents for preoperative clearance. Blood pressure is elevated. Will increase enalapril to 40 mg daily and patient is to return in 4 days for BP check. Revised cardiac index places patient for low risk of any adverse cardiac events. Medical clearance pending normal blood preoperative testing and normal electrolytes.

## 2019-02-20 NOTE — HISTORY OF PRESENT ILLNESS
[No Pertinent Cardiac History] : no history of aortic stenosis, atrial fibrillation, coronary artery disease, recent myocardial infarction, or implantable device/pacemaker [No Pertinent Pulmonary History] : no history of asthma, COPD, sleep apnea, or smoking [No Adverse Anesthesia Reaction] : no adverse anesthesia reaction in self or family member [Chronic Kidney Disease] : chronic kidney disease [Diabetes] : diabetes [(Patient denies any chest pain, claudication, dyspnea on exertion, orthopnea, palpitations or syncope)] : Patient denies any chest pain, claudication, dyspnea on exertion, orthopnea, palpitations or syncope [Moderate (4-6 METs)] : Moderate (4-6 METs) [Chronic Anticoagulation] : no chronic anticoagulation [FreeTextEntry1] : Bladder Surgery [FreeTextEntry2] : 2/20/19 [FreeTextEntry4] : Patient presents for preoperative clearance for bladder surgery. Patient states that blood pressure was elevated during preoperative testing. Has been compliant with medications. Patient denies any chest pain, chest tightness, shortness of breath or palpitations. Patient denies any headache or lightheadedness.

## 2019-02-20 NOTE — ADDENDUM
[FreeTextEntry1] : Patient to return for blood pressure check, blood pressure today 142/72. Patient medically cleared pending normal preoperative blood work.\par \par \par preoperative labs reviewed patient medically optimized for surgery.

## 2019-03-18 ENCOUNTER — RX RENEWAL (OUTPATIENT)
Age: 79
End: 2019-03-18

## 2019-04-17 ENCOUNTER — RX RENEWAL (OUTPATIENT)
Age: 79
End: 2019-04-17

## 2019-05-06 ENCOUNTER — RX RENEWAL (OUTPATIENT)
Age: 79
End: 2019-05-06

## 2019-06-05 ENCOUNTER — RX RENEWAL (OUTPATIENT)
Age: 79
End: 2019-06-05

## 2019-06-11 ENCOUNTER — RX RENEWAL (OUTPATIENT)
Age: 79
End: 2019-06-11

## 2019-07-09 ENCOUNTER — APPOINTMENT (OUTPATIENT)
Dept: ORTHOPEDIC SURGERY | Facility: CLINIC | Age: 79
End: 2019-07-09

## 2019-07-31 ENCOUNTER — APPOINTMENT (OUTPATIENT)
Dept: INTERNAL MEDICINE | Facility: CLINIC | Age: 79
End: 2019-07-31

## 2019-07-31 ENCOUNTER — INPATIENT (INPATIENT)
Facility: HOSPITAL | Age: 79
LOS: 1 days | Discharge: HOME | DRG: 175 | End: 2019-08-02
Attending: INTERNAL MEDICINE | Admitting: STUDENT IN AN ORGANIZED HEALTH CARE EDUCATION/TRAINING PROGRAM
Payer: MEDICARE

## 2019-07-31 VITALS
OXYGEN SATURATION: 93 % | WEIGHT: 184.97 LBS | HEIGHT: 66 IN | DIASTOLIC BLOOD PRESSURE: 80 MMHG | SYSTOLIC BLOOD PRESSURE: 115 MMHG | HEART RATE: 71 BPM | RESPIRATION RATE: 18 BRPM | TEMPERATURE: 98 F

## 2019-07-31 DIAGNOSIS — R06.02 SHORTNESS OF BREATH: ICD-10-CM

## 2019-07-31 DIAGNOSIS — E78.5 HYPERLIPIDEMIA, UNSPECIFIED: ICD-10-CM

## 2019-07-31 DIAGNOSIS — J96.01 ACUTE RESPIRATORY FAILURE WITH HYPOXIA: ICD-10-CM

## 2019-07-31 DIAGNOSIS — N40.0 BENIGN PROSTATIC HYPERPLASIA WITHOUT LOWER URINARY TRACT SYMPTOMS: ICD-10-CM

## 2019-07-31 DIAGNOSIS — I48.91 UNSPECIFIED ATRIAL FIBRILLATION: ICD-10-CM

## 2019-07-31 DIAGNOSIS — R09.89 OTHER SPECIFIED SYMPTOMS AND SIGNS INVOLVING THE CIRCULATORY AND RESPIRATORY SYSTEMS: ICD-10-CM

## 2019-07-31 DIAGNOSIS — I10 ESSENTIAL (PRIMARY) HYPERTENSION: ICD-10-CM

## 2019-07-31 DIAGNOSIS — Z29.9 ENCOUNTER FOR PROPHYLACTIC MEASURES, UNSPECIFIED: ICD-10-CM

## 2019-07-31 DIAGNOSIS — C67.9 MALIGNANT NEOPLASM OF BLADDER, UNSPECIFIED: ICD-10-CM

## 2019-07-31 DIAGNOSIS — E11.9 TYPE 2 DIABETES MELLITUS WITHOUT COMPLICATIONS: ICD-10-CM

## 2019-07-31 DIAGNOSIS — C67.9 MALIGNANT NEOPLASM OF BLADDER, UNSPECIFIED: Chronic | ICD-10-CM

## 2019-07-31 DIAGNOSIS — N17.9 ACUTE KIDNEY FAILURE, UNSPECIFIED: ICD-10-CM

## 2019-07-31 DIAGNOSIS — M10.9 GOUT, UNSPECIFIED: ICD-10-CM

## 2019-07-31 LAB
ALBUMIN SERPL ELPH-MCNC: 3.9 G/DL — SIGNIFICANT CHANGE UP (ref 3.3–5)
ALP SERPL-CCNC: 66 U/L — SIGNIFICANT CHANGE UP (ref 40–120)
ALT FLD-CCNC: 28 U/L — SIGNIFICANT CHANGE UP (ref 10–45)
ANION GAP SERPL CALC-SCNC: 15 MMOL/L — SIGNIFICANT CHANGE UP (ref 5–17)
AST SERPL-CCNC: 33 U/L — SIGNIFICANT CHANGE UP (ref 10–40)
BASOPHILS # BLD AUTO: 0 K/UL — SIGNIFICANT CHANGE UP (ref 0–0.2)
BASOPHILS NFR BLD AUTO: 0.4 % — SIGNIFICANT CHANGE UP (ref 0–2)
BILIRUB SERPL-MCNC: 0.4 MG/DL — SIGNIFICANT CHANGE UP (ref 0.2–1.2)
BUN SERPL-MCNC: 44 MG/DL — HIGH (ref 7–23)
CALCIUM SERPL-MCNC: 9.3 MG/DL — SIGNIFICANT CHANGE UP (ref 8.4–10.5)
CHLORIDE SERPL-SCNC: 92 MMOL/L — LOW (ref 96–108)
CO2 SERPL-SCNC: 28 MMOL/L — SIGNIFICANT CHANGE UP (ref 22–31)
CREAT SERPL-MCNC: 2.02 MG/DL — HIGH (ref 0.5–1.3)
D DIMER BLD IA.RAPID-MCNC: 2078 NG/ML DDU — HIGH
EOSINOPHIL # BLD AUTO: 0.2 K/UL — SIGNIFICANT CHANGE UP (ref 0–0.5)
EOSINOPHIL NFR BLD AUTO: 2.8 % — SIGNIFICANT CHANGE UP (ref 0–6)
GLUCOSE SERPL-MCNC: 132 MG/DL — HIGH (ref 70–99)
HCT VFR BLD CALC: 35.6 % — LOW (ref 39–50)
HGB BLD-MCNC: 11.9 G/DL — LOW (ref 13–17)
LYMPHOCYTES # BLD AUTO: 1.7 K/UL — SIGNIFICANT CHANGE UP (ref 1–3.3)
LYMPHOCYTES # BLD AUTO: 27.6 % — SIGNIFICANT CHANGE UP (ref 13–44)
MCHC RBC-ENTMCNC: 28.4 PG — SIGNIFICANT CHANGE UP (ref 27–34)
MCHC RBC-ENTMCNC: 33.5 GM/DL — SIGNIFICANT CHANGE UP (ref 32–36)
MCV RBC AUTO: 84.7 FL — SIGNIFICANT CHANGE UP (ref 80–100)
MONOCYTES # BLD AUTO: 0.7 K/UL — SIGNIFICANT CHANGE UP (ref 0–0.9)
MONOCYTES NFR BLD AUTO: 11.6 % — SIGNIFICANT CHANGE UP (ref 2–14)
NEUTROPHILS # BLD AUTO: 3.6 K/UL — SIGNIFICANT CHANGE UP (ref 1.8–7.4)
NEUTROPHILS NFR BLD AUTO: 57.6 % — SIGNIFICANT CHANGE UP (ref 43–77)
NT-PROBNP SERPL-SCNC: 4769 PG/ML — HIGH (ref 0–300)
PLATELET # BLD AUTO: 152 K/UL — SIGNIFICANT CHANGE UP (ref 150–400)
POTASSIUM SERPL-MCNC: 4.2 MMOL/L — SIGNIFICANT CHANGE UP (ref 3.5–5.3)
POTASSIUM SERPL-SCNC: 4.2 MMOL/L — SIGNIFICANT CHANGE UP (ref 3.5–5.3)
PROT SERPL-MCNC: 6.9 G/DL — SIGNIFICANT CHANGE UP (ref 6–8.3)
RBC # BLD: 4.21 M/UL — SIGNIFICANT CHANGE UP (ref 4.2–5.8)
RBC # FLD: 12.5 % — SIGNIFICANT CHANGE UP (ref 10.3–14.5)
SODIUM SERPL-SCNC: 135 MMOL/L — SIGNIFICANT CHANGE UP (ref 135–145)
TROPONIN T, HIGH SENSITIVITY RESULT: 20 NG/L — SIGNIFICANT CHANGE UP (ref 0–51)
TROPONIN T, HIGH SENSITIVITY RESULT: 24 NG/L — SIGNIFICANT CHANGE UP (ref 0–51)
WBC # BLD: 6.2 K/UL — SIGNIFICANT CHANGE UP (ref 3.8–10.5)
WBC # FLD AUTO: 6.2 K/UL — SIGNIFICANT CHANGE UP (ref 3.8–10.5)

## 2019-07-31 PROCEDURE — 78582 LUNG VENTILAT&PERFUS IMAGING: CPT | Mod: 26

## 2019-07-31 PROCEDURE — 99223 1ST HOSP IP/OBS HIGH 75: CPT | Mod: AI,GC

## 2019-07-31 PROCEDURE — 93010 ELECTROCARDIOGRAM REPORT: CPT

## 2019-07-31 PROCEDURE — 93970 EXTREMITY STUDY: CPT | Mod: 26

## 2019-07-31 PROCEDURE — 71046 X-RAY EXAM CHEST 2 VIEWS: CPT | Mod: 26

## 2019-07-31 PROCEDURE — 99285 EMERGENCY DEPT VISIT HI MDM: CPT

## 2019-07-31 RX ORDER — SODIUM CHLORIDE 9 MG/ML
1000 INJECTION, SOLUTION INTRAVENOUS
Refills: 0 | Status: DISCONTINUED | OUTPATIENT
Start: 2019-07-31 | End: 2019-08-02

## 2019-07-31 RX ORDER — DEXTROSE 50 % IN WATER 50 %
25 SYRINGE (ML) INTRAVENOUS ONCE
Refills: 0 | Status: DISCONTINUED | OUTPATIENT
Start: 2019-07-31 | End: 2019-08-02

## 2019-07-31 RX ORDER — TAMSULOSIN HYDROCHLORIDE 0.4 MG/1
0.4 CAPSULE ORAL AT BEDTIME
Refills: 0 | Status: DISCONTINUED | OUTPATIENT
Start: 2019-07-31 | End: 2019-08-02

## 2019-07-31 RX ORDER — GLUCAGON INJECTION, SOLUTION 0.5 MG/.1ML
1 INJECTION, SOLUTION SUBCUTANEOUS ONCE
Refills: 0 | Status: DISCONTINUED | OUTPATIENT
Start: 2019-07-31 | End: 2019-08-02

## 2019-07-31 RX ORDER — INSULIN LISPRO 100/ML
VIAL (ML) SUBCUTANEOUS
Refills: 0 | Status: DISCONTINUED | OUTPATIENT
Start: 2019-07-31 | End: 2019-08-02

## 2019-07-31 RX ORDER — METOPROLOL TARTRATE 50 MG
25 TABLET ORAL
Refills: 0 | Status: DISCONTINUED | OUTPATIENT
Start: 2019-07-31 | End: 2019-08-02

## 2019-07-31 RX ORDER — DEXTROSE 50 % IN WATER 50 %
15 SYRINGE (ML) INTRAVENOUS ONCE
Refills: 0 | Status: DISCONTINUED | OUTPATIENT
Start: 2019-07-31 | End: 2019-08-02

## 2019-07-31 RX ORDER — FUROSEMIDE 40 MG
20 TABLET ORAL ONCE
Refills: 0 | Status: COMPLETED | OUTPATIENT
Start: 2019-07-31 | End: 2019-07-31

## 2019-07-31 RX ORDER — DEXTROSE 50 % IN WATER 50 %
12.5 SYRINGE (ML) INTRAVENOUS ONCE
Refills: 0 | Status: DISCONTINUED | OUTPATIENT
Start: 2019-07-31 | End: 2019-08-02

## 2019-07-31 RX ORDER — INSULIN LISPRO 100/ML
VIAL (ML) SUBCUTANEOUS AT BEDTIME
Refills: 0 | Status: DISCONTINUED | OUTPATIENT
Start: 2019-07-31 | End: 2019-08-02

## 2019-07-31 RX ORDER — ENOXAPARIN SODIUM 100 MG/ML
80 INJECTION SUBCUTANEOUS
Refills: 0 | Status: DISCONTINUED | OUTPATIENT
Start: 2019-07-31 | End: 2019-08-02

## 2019-07-31 RX ORDER — ASPIRIN/CALCIUM CARB/MAGNESIUM 324 MG
81 TABLET ORAL DAILY
Refills: 0 | Status: DISCONTINUED | OUTPATIENT
Start: 2019-07-31 | End: 2019-08-02

## 2019-07-31 RX ORDER — SIMVASTATIN 20 MG/1
20 TABLET, FILM COATED ORAL AT BEDTIME
Refills: 0 | Status: DISCONTINUED | OUTPATIENT
Start: 2019-07-31 | End: 2019-08-02

## 2019-07-31 RX ORDER — FINASTERIDE 5 MG/1
5 TABLET, FILM COATED ORAL DAILY
Refills: 0 | Status: DISCONTINUED | OUTPATIENT
Start: 2019-07-31 | End: 2019-08-02

## 2019-07-31 RX ORDER — ENOXAPARIN SODIUM 100 MG/ML
80 INJECTION SUBCUTANEOUS ONCE
Refills: 0 | Status: COMPLETED | OUTPATIENT
Start: 2019-07-31 | End: 2019-07-31

## 2019-07-31 RX ADMIN — ENOXAPARIN SODIUM 80 MILLIGRAM(S): 100 INJECTION SUBCUTANEOUS at 18:58

## 2019-07-31 RX ADMIN — ENOXAPARIN SODIUM 80 MILLIGRAM(S): 100 INJECTION SUBCUTANEOUS at 06:30

## 2019-07-31 RX ADMIN — TAMSULOSIN HYDROCHLORIDE 0.4 MILLIGRAM(S): 0.4 CAPSULE ORAL at 21:22

## 2019-07-31 RX ADMIN — SIMVASTATIN 20 MILLIGRAM(S): 20 TABLET, FILM COATED ORAL at 21:22

## 2019-07-31 RX ADMIN — Medication 20 MILLIGRAM(S): at 18:56

## 2019-07-31 NOTE — H&P ADULT - PROBLEM SELECTOR PLAN 3
- Hx of CKD II  - monitor creatinine trend  - avoid nephrotoxic agents  - renally dose medications - Get coags and start anticoagulation (heparin IV)   - CHADS-Vasc 5  - consult EP

## 2019-07-31 NOTE — ED ADULT NURSE REASSESSMENT NOTE - NS ED NURSE REASSESS COMMENT FT1
patient is resting in bed in no acute distress. patient denies pain. waiting for bed. patient aware of plan of care. call bell in reach.

## 2019-07-31 NOTE — H&P ADULT - PROBLEM SELECTOR PLAN 2
- Get INR and start anticoagulation   - CHADS-Vasc 5 - Get coags and start anticoagulation   - CHADS-Vasc 5 - Get coags and start anticoagulation (heparin IV)   - CHADS-Vasc 5  - consult EP -Plan same as above

## 2019-07-31 NOTE — H&P ADULT - PROBLEM SELECTOR PLAN 10
- Lovenox 80mg for DVT prophylaxis  - DASH diet  - Disposition: PT evaluation and send home - Lovenox 80mg for DVT prophylaxis  - DASH/TLC diet  - Disposition: PT evaluation and send home

## 2019-07-31 NOTE — ED ADULT NURSE REASSESSMENT NOTE - NS ED NURSE REASSESS COMMENT FT1
MD states don't need actual weight for Lovenox, stated weight its fine.  Pt had positive d-dimer, will give 2 L NC.

## 2019-07-31 NOTE — H&P ADULT - ATTENDING COMMENTS
acute hypoxemic respiratory failure  patient clinically does not appear too overloaded  r/o PE with V/q scan given elevated d dimer and high likelihood of bladder ca  monitor cbc  wean off of 02  lasix prn  2d echo

## 2019-07-31 NOTE — H&P ADULT - ASSESSMENT
79 year old man 79 year old man w/ past medical history of HTN, HLD, CKD III, and bladder cancer is presenting with acute onset shortness of breath in the setting of bilateral lower leg swelling likely 2/2 to heart failure. 79 year old man w/ past medical history of HTN, HLD, CKD II, and bladder cancer is presenting with acute onset shortness of breath in the setting of bilateral lower leg swelling likely 2/2 to acute on chronic heart failure. 79 year old man w/ past medical history of HTN, HLD, CKD II, CVA, and bladder cancer is presenting with acute onset shortness of breath in the setting of bilateral lower leg swelling likely 2/2 to acute on chronic heart failure.

## 2019-07-31 NOTE — ED PROVIDER NOTE - NS ED ROS FT
ROS:  GEN: (-) fevers/chills, (-) weight loss, (-) fatigue/malaise  HEENT: (-) visual change, (-) photophobia, (-) nasal congestion/rhinorrhea, (-) difficulty swallowing  NECK: (-) stiffness, (-) swelling  RESP: (+) shortness of breath, (-) cough, (-) sputum, (-) hemoptysis, (+) AMIN  CV: (-) chest pain, (-) palpitations, (+) PND  GI: (-) nausea, (-) vomiting, (-) pain, (-) constipation, (-) diarrhea, (-) melena, (-) BRBPR  : (-) frequency/urgency, (-) hematuria, (-) dysuria, (-) incontinence, (-) discharge  EXT: (+) edema, (-) cyanosis  ENDO: (-) polyuria  NEURO: (-) paresthesias, (-) weakness, (-) headache, (-) dizziness, (-) syncope  MSK: no recent trauma, no muscle pain

## 2019-07-31 NOTE — ED PROVIDER NOTE - OBJECTIVE STATEMENT
79M presenting to the ED w/ moderate shortness of breath, progressively getting worse over the past few days, states that he is unable to walk or carry out his usual activities because he gets short of breath. States that this has never happened to him. Patient denies ever being told that his heart might be weak. States that the shortness of breath is exacerbated by walking and sometimes by lying flat, denies any alleviating factors. Denies any sick contacts, recent travel. Has also noticed worsening B/L LE swelling over the past few months, denies any exacerbating/alleviating factors, states that he has pain when he walks due to the swelling.    PMHx: HTN, BPH, Gout  PSHx: inguinal hernia repair  Allergies: NKDA

## 2019-07-31 NOTE — H&P ADULT - NSHPLABSRESULTS_GEN_ALL_CORE
CBC Full  -  ( 31 Jul 2019 05:11 )  WBC Count : 6.2 K/uL  RBC Count : 4.21 M/uL  Hemoglobin : 11.9 g/dL  Hematocrit : 35.6 %  Platelet Count - Automated : 152 K/uL  Mean Cell Volume : 84.7 fl  Mean Cell Hemoglobin : 28.4 pg  Mean Cell Hemoglobin Concentration : 33.5 gm/dL  Auto Neutrophil # : 3.6 K/uL  Auto Lymphocyte # : 1.7 K/uL  Auto Monocyte # : 0.7 K/uL  Auto Eosinophil # : 0.2 K/uL  Auto Basophil # : 0.0 K/uL  Auto Neutrophil % : 57.6 %  Auto Lymphocyte % : 27.6 %  Auto Monocyte % : 11.6 %  Auto Eosinophil % : 2.8 %  Auto Basophil % : 0.4 %    07-31    135  |  92<L>  |  44<H>  ----------------------------<  132<H>  4.2   |  28  |  2.02<H>    Ca    9.3      31 Jul 2019 05:11    TPro  6.9  /  Alb  3.9  /  TBili  0.4  /  DBili  x   /  AST  33  /  ALT  28  /  AlkPhos  66  07-31    D-dimer: 2078  BNP: 4769  Trop: 24 The labs were reviewed by me. Imaging was reviewed by me. EKG was reviewed by me.                        11.9   6.2   )-----------( 152      ( 31 Jul 2019 05:11 )             35.6       07-31    135  |  92<L>  |  44<H>  ----------------------------<  132<H>  4.2   |  28  |  2.02<H>    Ca    9.3      31 Jul 2019 05:11    TPro  6.9  /  Alb  3.9  /  TBili  0.4  /  DBili  x   /  AST  33  /  ALT  28  /  AlkPhos  66  07-31          EKG: AF, vr 69, qt 424    RADIOLOGY:  < from: Xray Chest 2 Views PA/Lat (07.31.19 @ 05:33) >    FINDINGS:     Cardiac size is within normal limits.   Asymmetric elevation of the left hemidiaphragm. Left midlung linear   atelectasis, otherwise clear lungs.   There are no pleural effusions. No pneumothorax.  There is no acute osseous abnormality.     IMPRESSION:   Left midlung linear atelectasis, otherwise clear lungs.     < from: VA Duplex Lower Ext Vein Scan, Bilat (07.31.19 @ 09:41) >      FINDINGS:  There is normal compressibility of the bilateral common femoral, femoral   and popliteal veins.   Doppler examination shows normal spontaneous and phasic flow.  No calf vein thrombosis is detected.  Occlusive thrombus is detected within the left greater saphenous vein   consistent with history of prior ablation.    IMPRESSION:   No evidence of deep venous thrombosis in either lower extremity.

## 2019-07-31 NOTE — H&P ADULT - PROBLEM SELECTOR PLAN 1
- Most likely 2/2 to heart failure in the setting of b/l leg swelling and elevated BNP  - Chest X-ray was clear except for mild L mid-lung atelactasis  - DVT ruled out w/ b/l lower extremity duplex in the setting of elevated D-dimer  - Furosemide to relieve fluid overload  - Oxygen PRN by nasal canula - Most likely 2/2 to heart failure in the setting of b/l leg swelling and elevated BNP  - Do an echocardiogram  - Chest X-ray was clear except for mild L mid-lung atelactasis  - DVT ruled out w/ b/l lower extremity duplex in the setting of elevated D-dimer  - Furosemide 20mg iv bolus x 1 to relieve fluid overload  - Oxygen PRN by nasal canula - DDX includes PE (do V/Q scan), heart failure (b/l leg swelling and elevated BNP)  - Do an echocardiogram  - Chest X-ray was clear except for mild L mid-lung atelactasis  - DVT ruled out w/ b/l lower extremity duplex in the setting of elevated D-dimer  - Furosemide 20mg iv bolus x 1 to relieve fluid overload  - Oxygen 4L by nasal canula

## 2019-07-31 NOTE — ED ADULT NURSE REASSESSMENT NOTE - NS ED NURSE REASSESS COMMENT FT1
patient is resting in bed in no acute distress. just returned from vascular. patient denies pain. waiting for bed. call bell in reach. will continue to monitor.

## 2019-07-31 NOTE — H&P ADULT - NSHPSOCIALHISTORY_GEN_ALL_CORE
Worked as a  who retired at age 67. Denies smoking and drinking. Denies illicit dug use. Worked as a  who retired at age 67. Denies smoking and drinking. Denies illicit dug use. , lives with wife.

## 2019-07-31 NOTE — H&P ADULT - PROBLEM SELECTOR PROBLEM 6
Gouty arthritis Benign prostate hyperplasia Diabetes mellitus type 2, noninsulin dependent Hyperlipidemia

## 2019-07-31 NOTE — H&P ADULT - NSHPREVIEWOFSYSTEMS_GEN_ALL_CORE
Endorses SOB and bilateral leg swelling  Denies headache, changes in vision, cough, chest pain, diarrhea, constipation, abdominal pain, muscle weakness, urinary problems REVIEW OF SYSTEMS:  CONSTITUTIONAL: No weakness, fevers or chills  EYES/ENT: No visual changes;  No vertigo or throat pain   NECK: No pain or stiffness  RESPIRATORY: No cough, wheezing, hemoptysis; +shortness of breath  CARDIOVASCULAR: No chest pain or palpitations  GASTROINTESTINAL: No abdominal or epigastric pain. No nausea, vomiting, or hematemesis; No diarrhea or constipation. No melena or hematochezia.  GENITOURINARY: No dysuria, frequency or hematuria  NEUROLOGICAL: No numbness or weakness  SKIN: No itching, burning, rashes, or lesions   All other review of systems is negative unless indicated above.

## 2019-07-31 NOTE — ED ADULT NURSE NOTE - NSIMPLEMENTINTERV_GEN_ALL_ED
Implemented All Fall Risk Interventions:  [place] to call system. Call bell, personal items and telephone within reach. Instruct patient to call for assistance. Room bathroom lighting operational. Non-slip footwear when patient is off stretcher. Physically safe environment: no spills, clutter or unnecessary equipment. Stretcher in lowest position, wheels locked, appropriate side rails in place. Provide visual cue, wrist band, yellow gown, etc. Monitor gait and stability. Monitor for mental status changes and reorient to person, place, and time. Review medications for side effects contributing to fall risk. Reinforce activity limits and safety measures with patient and family.

## 2019-07-31 NOTE — H&P ADULT - PROBLEM SELECTOR PLAN 5
- continue simvastatin 20mg - continue simvastatin 20mg  - get cholesterol lab - Metoprolol 100mg bid

## 2019-07-31 NOTE — H&P ADULT - PROBLEM SELECTOR PLAN 4
- - Metoprolol 100mg bid - Hx of CKD II  - monitor creatinine trend  - avoid nephrotoxic agents  - renally dose medications

## 2019-07-31 NOTE — ED ADULT NURSE NOTE - OBJECTIVE STATEMENT
79 year old male presents to the ED via walk in with c/o SOB x 2-3 days. Patient expresses worsening AMIN and head pressure when walking up stairs. 92% on RA in triage, pt alternates between 91-95% on RA in main ED. Breathing is unlabored and spontaneous, pt denies feeling SOB at this time. Family at bedside for support, comfort and safety measures maintained.

## 2019-07-31 NOTE — H&P ADULT - NSICDXPASTMEDICALHX_GEN_ALL_CORE_FT
PAST MEDICAL HISTORY:  Bladder cancer     BPH (Benign Prostatic Hypertrophy)     Gouty arthritis     Hyperlipidemia     Hypertension PAST MEDICAL HISTORY:  Bladder cancer     BPH (Benign Prostatic Hypertrophy)     Cerebrovascular accident (CVA)     Diabetes mellitus     Gouty arthritis     Hyperlipidemia     Hypertension

## 2019-07-31 NOTE — ED PROVIDER NOTE - PHYSICAL EXAMINATION
VITALS REVIEWED.  Afebrile, slightly hypertensive, saturating 88% on RA  GENERALIZED APPEARANCE:  Comfortable, no acute distress  SKIN:  Warm, dry, no cyanosis  HEAD:  No obvious scalp lesions  EYES:  Conjunctiva pink, no icterus  ENMT:  Mucus membranes moist, no stridor  NECK:  Supple, non-tender  CHEST AND RESPIRATORY:  Clear to auscultation B/L, good air entry B/L, equal chest expansion  HEART AND CARDIOVASCULAR:  Regular rate, no obvious murmur  ABDOMEN AND GI:  Soft, non-tender, non-distended.  No rebound, no guarding  EXTREMITIES:  No deformity, +3 pitting edema B/L, mild calf tenderness B/L  NEURO: AAOx3, gross motor and sensory intact

## 2019-07-31 NOTE — H&P ADULT - NSHPPHYSICALEXAM_GEN_ALL_CORE
PHYSICAL EXAM:      Constitutional: appropriately groomed, comfortable, older male    Eyes: EOMI, conjunctiva and sclera clear    ENMT: moist mucous membranes    Neck: no JVD appreciated, no carotid bruit    Respiratory: CTAB, no rale, rhonchi, wheezing    Cardiovascular: irregularly irregular rhythm, no rubs/gallops/murmurs    Gastrointestinal: +BS, nontender, nondistended, no HSM    Genitourinary: deferred    Rectal: deferred    Extremities: +1 pitting edema bilaterally at the ankles, no cyanosis    Neurological: A&Ox3 PHYSICAL EXAM:  Constitutional: appropriately groomed, comfortable, older male  Eyes: EOMI, conjunctiva and sclera clear  ENMT: moist mucous membranes  Neck: no JVD appreciated, no carotid bruit  Respiratory: CTAB, no rale, rhonchi, wheezing  Cardiovascular: irregularly irregular rhythm, no rubs/gallops/murmurs  Gastrointestinal: +BS, nontender, nondistended, no HSM  Genitourinary: deferred  Rectal: deferred  Extremities: +1 pitting edema bilaterally at the ankles, no cyanosis  Neurological: A&Ox3  Skin: venous stasis bilaterally

## 2019-07-31 NOTE — ED ADULT NURSE REASSESSMENT NOTE - NS ED NURSE REASSESS COMMENT FT1
received report from DWIGHT Willard. patient is AAOx3. lung sounds CTA bilaterally. cap refill <3sec. +2 radial pules noted. no swelling, chest pain, SOB, pain at this time as per patient. patient VSS. NSR on monitor. call bell in reach. family at the bedside. aware of plan of care.

## 2019-07-31 NOTE — H&P ADULT - HISTORY OF PRESENT ILLNESS
79y M w/ PMHx significant for hypertension and bladder cancer is presenting to the ED with acute onset shortness of breath. According to patient, shortness of breath started on 7/29/19 as he was lying down to go to sleep. He moved to the couch and it somewhat resolved by the morning. Patient denies an association between physical activity and the SOB. States he can walk without any difficulty. Denies any chest pain, cough, fever, recent travel, sick contacts. Denies any history of heart disease. He has noticed swelling in both of his legs for a couple years but can't tell if it has recently worsened. 79y M w/ PMHx significant for hypertension, HLD, CKD II, and bladder cancer is presenting to the ED with acute onset shortness of breath. According to patient, shortness of breath started on 7/29/19 as he was lying down to go to sleep. He moved to the couch and it somewhat resolved by the morning. Patient denies an association between physical activity and the SOB. Patient walks at home slowly using a cane due to heavy legs. He has noticed swelling in both of his legs for a couple years but can't tell if it has recently worsened. He is mostly sedentary. Denies any chest pain, cough, fever, recent travel, sick contacts. Denies any history of heart disease. 79y M w/ PMHx significant for hypertension, HLD, CKD II, and bladder cancer is presenting to the ED with acute onset shortness of breath x 2 days. According to patient, shortness of breath started on 7/29/19 as he was lying down to go to sleep. He moved to the couch and it somewhat resolved by the morning. Reports it has never happened before. He also has noticed swelling in both of his legs for a couple years but can't tell if it has recently worsened. He is mostly sedentary at home, ambulatory at home with a cane. Patient denies dyspnea on exertion or using increased pillows at night to sleep. Does not wake up at night from shortness of breath. Denies any chest pain, dizziness, lightheadness, vision changes, cough, fever, recent travel, sick contacts. Denies any history of heart disease, heart failure, or arrhythmia.    In the ED, VS t97.9, HR 71 , /80, RR 18, 93% spo2 on RA. He received lovenox 80mg for suspicion of PE. 79y M w/ PMHx significant for hypertension, HLD, CKD II, CVA, and bladder cancer is presenting to the ED with acute onset shortness of breath x 2 days. According to patient, shortness of breath started on 7/29/19 as he was lying down to go to sleep. He moved to the couch and it somewhat resolved by the morning. Reports it has never happened before. He also has noticed swelling in both of his legs for a couple years but can't tell if it has recently worsened. He is mostly sedentary at home, ambulatory at home with a cane. Patient denies dyspnea on exertion or using increased pillows at night to sleep. Does not wake up at night from shortness of breath. Denies any chest pain, dizziness, lightheadness, vision changes, cough, fever, recent travel, sick contacts. Denies any history of heart disease, heart failure, or arrhythmia.    In the ED, VS t97.9, HR 71 , /80, RR 18, 93% spo2 on RA. He received lovenox 80mg for suspicion of PE.

## 2019-08-01 ENCOUNTER — TRANSCRIPTION ENCOUNTER (OUTPATIENT)
Age: 79
End: 2019-08-01

## 2019-08-01 DIAGNOSIS — I26.99 OTHER PULMONARY EMBOLISM WITHOUT ACUTE COR PULMONALE: ICD-10-CM

## 2019-08-01 LAB
ALBUMIN SERPL ELPH-MCNC: 3.8 G/DL — SIGNIFICANT CHANGE UP (ref 3.3–5)
ALP SERPL-CCNC: 60 U/L — SIGNIFICANT CHANGE UP (ref 40–120)
ALT FLD-CCNC: 25 U/L — SIGNIFICANT CHANGE UP (ref 10–45)
ANION GAP SERPL CALC-SCNC: 13 MMOL/L — SIGNIFICANT CHANGE UP (ref 5–17)
AST SERPL-CCNC: 19 U/L — SIGNIFICANT CHANGE UP (ref 10–40)
BILIRUB SERPL-MCNC: 0.4 MG/DL — SIGNIFICANT CHANGE UP (ref 0.2–1.2)
BUN SERPL-MCNC: 39 MG/DL — HIGH (ref 7–23)
CALCIUM SERPL-MCNC: 9.3 MG/DL — SIGNIFICANT CHANGE UP (ref 8.4–10.5)
CHLORIDE SERPL-SCNC: 97 MMOL/L — SIGNIFICANT CHANGE UP (ref 96–108)
CHOLEST SERPL-MCNC: 101 MG/DL — SIGNIFICANT CHANGE UP (ref 10–199)
CO2 SERPL-SCNC: 30 MMOL/L — SIGNIFICANT CHANGE UP (ref 22–31)
CREAT SERPL-MCNC: 1.9 MG/DL — HIGH (ref 0.5–1.3)
GLUCOSE SERPL-MCNC: 116 MG/DL — HIGH (ref 70–99)
HBA1C BLD-MCNC: 6.5 % — HIGH (ref 4–5.6)
HCT VFR BLD CALC: 37.2 % — LOW (ref 39–50)
HDLC SERPL-MCNC: 26 MG/DL — LOW
HGB BLD-MCNC: 12.2 G/DL — LOW (ref 13–17)
LIPID PNL WITH DIRECT LDL SERPL: 54 MG/DL — SIGNIFICANT CHANGE UP
MAGNESIUM SERPL-MCNC: 2.2 MG/DL — SIGNIFICANT CHANGE UP (ref 1.6–2.6)
MCHC RBC-ENTMCNC: 27.9 PG — SIGNIFICANT CHANGE UP (ref 27–34)
MCHC RBC-ENTMCNC: 32.9 GM/DL — SIGNIFICANT CHANGE UP (ref 32–36)
MCV RBC AUTO: 84.9 FL — SIGNIFICANT CHANGE UP (ref 80–100)
PHOSPHATE SERPL-MCNC: 5.2 MG/DL — HIGH (ref 2.5–4.5)
PLATELET # BLD AUTO: 163 K/UL — SIGNIFICANT CHANGE UP (ref 150–400)
POTASSIUM SERPL-MCNC: 3.6 MMOL/L — SIGNIFICANT CHANGE UP (ref 3.5–5.3)
POTASSIUM SERPL-SCNC: 3.6 MMOL/L — SIGNIFICANT CHANGE UP (ref 3.5–5.3)
PROT SERPL-MCNC: 6.5 G/DL — SIGNIFICANT CHANGE UP (ref 6–8.3)
RBC # BLD: 4.38 M/UL — SIGNIFICANT CHANGE UP (ref 4.2–5.8)
RBC # FLD: 12.5 % — SIGNIFICANT CHANGE UP (ref 10.3–14.5)
SODIUM SERPL-SCNC: 140 MMOL/L — SIGNIFICANT CHANGE UP (ref 135–145)
TOTAL CHOLESTEROL/HDL RATIO MEASUREMENT: 3.9 RATIO — SIGNIFICANT CHANGE UP (ref 3.4–9.6)
TRIGL SERPL-MCNC: 106 MG/DL — SIGNIFICANT CHANGE UP (ref 10–149)
WBC # BLD: 5.6 K/UL — SIGNIFICANT CHANGE UP (ref 3.8–10.5)
WBC # FLD AUTO: 5.6 K/UL — SIGNIFICANT CHANGE UP (ref 3.8–10.5)

## 2019-08-01 PROCEDURE — 99233 SBSQ HOSP IP/OBS HIGH 50: CPT | Mod: GC

## 2019-08-01 PROCEDURE — 93306 TTE W/DOPPLER COMPLETE: CPT | Mod: 26

## 2019-08-01 RX ORDER — ENOXAPARIN SODIUM 100 MG/ML
80 INJECTION SUBCUTANEOUS
Qty: 24 | Refills: 1
Start: 2019-08-01 | End: 2019-09-29

## 2019-08-01 RX ADMIN — Medication 25 MILLIGRAM(S): at 18:26

## 2019-08-01 RX ADMIN — SIMVASTATIN 20 MILLIGRAM(S): 20 TABLET, FILM COATED ORAL at 21:22

## 2019-08-01 RX ADMIN — ENOXAPARIN SODIUM 80 MILLIGRAM(S): 100 INJECTION SUBCUTANEOUS at 18:25

## 2019-08-01 RX ADMIN — ENOXAPARIN SODIUM 80 MILLIGRAM(S): 100 INJECTION SUBCUTANEOUS at 05:05

## 2019-08-01 RX ADMIN — TAMSULOSIN HYDROCHLORIDE 0.4 MILLIGRAM(S): 0.4 CAPSULE ORAL at 21:22

## 2019-08-01 RX ADMIN — Medication 81 MILLIGRAM(S): at 13:27

## 2019-08-01 RX ADMIN — Medication 25 MILLIGRAM(S): at 05:05

## 2019-08-01 RX ADMIN — FINASTERIDE 5 MILLIGRAM(S): 5 TABLET, FILM COATED ORAL at 13:28

## 2019-08-01 NOTE — DISCHARGE NOTE PROVIDER - CARE PROVIDER_API CALL
Dylan Lara)  Internal Medicine  [address], [address] [place] [address]  Phone: [phone]  Fax: [phone]  Follow Up Time: 1 week Dylan Lara)  Internal Medicine  [address] [facility] [address] [facility] [address], [address] [place] [address]  Phone: [phone]  Fax: [phone]  Follow Up Time: 1 week    Drake Aguirre)  Urology  [address], [address]  [place] [address]  Phone: [phone]  Fax: [phone]  Follow Up Time:

## 2019-08-01 NOTE — PROGRESS NOTE ADULT - PROBLEM SELECTOR PLAN 10
- Lovenox 80mg bid for DVT prophylaxis  - DASH/TLC diet  - Disposition: order PT evaluation and send home - Lovenox 80mg bid   - DASH/TLC diet  - Disposition: PT evaluation

## 2019-08-01 NOTE — DISCHARGE NOTE PROVIDER - NSDCCPCAREPLAN_GEN_ALL_CORE_FT
PRINCIPAL DISCHARGE DIAGNOSIS  Diagnosis: Pulmonary embolism  Assessment and Plan of Treatment:       SECONDARY DISCHARGE DIAGNOSES  Diagnosis: Atrial fibrillation  Assessment and Plan of Treatment: anticoagulate with Lovenox 80mg bid    Diagnosis: MERCY (acute kidney injury)  Assessment and Plan of Treatment:     Diagnosis: Type II diabetes mellitus  Assessment and Plan of Treatment: Start metformin 500mg once a day    Diagnosis: HLD (hyperlipidemia)  Assessment and Plan of Treatment: Continue simvastatin    Diagnosis: HTN (hypertension)  Assessment and Plan of Treatment: PRINCIPAL DISCHARGE DIAGNOSIS  Diagnosis: Pulmonary embolism  Assessment and Plan of Treatment: You came in with shortness of breath. After a imaging study was performed, you were determined to have pulmonary embolism. You were treated for it in the hospital with Lovenox 80mg twice a day. You also received oxygen temporarily.      SECONDARY DISCHARGE DIAGNOSES  Diagnosis: Atrial fibrillation  Assessment and Plan of Treatment: In the hospital you were found to have an abnormal heart rhythm called atrial fibrillation. This can predispose patients to clot formation in their heart which can become a source for stroke. You will be prescribed Lovenox 80mg twice a day to help with this.    Diagnosis: MERCY (acute kidney injury)  Assessment and Plan of Treatment: You have a chronic decrease in your kidney function. At the hospital, you were found to have a worse function than your usual baseline. Over your hospital course, this has improved. Follow up with a nephrologist for optimal treatment.    Diagnosis: Type II diabetes mellitus  Assessment and Plan of Treatment: You have been diagnosed with diabetes. Follow-up with your primary care doctor for optimal management.    Diagnosis: HLD (hyperlipidemia)  Assessment and Plan of Treatment: Continue to take simvastatin as prescribed for your cholesterol levels. Follow up with your primary care.    Diagnosis: HTN (hypertension)  Assessment and Plan of Treatment: You came in with high blood pressure. We have optimized your medication to include enalapril and metoprolol. Losartan has been discontinued for your blood pressure. PRINCIPAL DISCHARGE DIAGNOSIS  Diagnosis: Pulmonary embolism  Assessment and Plan of Treatment: You came in with shortness of breath. After a imaging study was performed, you were determined to have pulmonary embolism. You were treated for it in the hospital with Lovenox 80mg twice a day. You also received oxygen temporarily. It is very important to continue to take your lovenox twice a day every day to prevent the clot in your lungs from getting worse.   If you experience shortness of breath, chest pain, dizziness, or worsening symptoms, please call your doctor or return to the ED.      SECONDARY DISCHARGE DIAGNOSES  Diagnosis: HLD (hyperlipidemia)  Assessment and Plan of Treatment: Continue to take simvastatin as prescribed for your cholesterol levels. Follow up with your primary care.    Diagnosis: Type II diabetes mellitus  Assessment and Plan of Treatment: You have been diagnosed with diabetes. Your A1c, blood sugar test was found to be 6.5. You do not take any medications for this. Follow-up with your primary care doctor for optimal management.    Diagnosis: HTN (hypertension)  Assessment and Plan of Treatment: You came in with high blood pressure. We have optimized your medication to include enalapril and metoprolol. Losartan has been discontinued for your blood pressure.    Diagnosis: MERCY (acute kidney injury)  Assessment and Plan of Treatment: You have a chronic decrease in your kidney function. At the hospital, you were found to have a worse function than your usual baseline. Over your hospital course, this has improved. Follow up with a nephrologist for optimal treatment.    Diagnosis: Atrial fibrillation  Assessment and Plan of Treatment: In the hospital you were found to have an abnormal heart rhythm called atrial fibrillation. This can predispose patients to clot formation in their heart which can become a source for stroke. You will be prescribed Lovenox 80mg twice a day to help with this.

## 2019-08-01 NOTE — DISCHARGE NOTE PROVIDER - CARE PROVIDERS DIRECT ADDRESSES
[email or web address]

## 2019-08-01 NOTE — PROGRESS NOTE ADULT - PROBLEM SELECTOR PLAN 1
- diagnosed PE by V/Q scan --> continue enoxaparin 80mg bid  - pending echocardiogram  - Chest X-ray was clear except for mild L mid-lung atelactasis  - DVT ruled out w/ b/l lower extremity duplex in the setting of elevated D-dimer  - Furosemide 20mg iv bolus x 1 to relieve fluid overload  - Oxygen 2L by nasal canula --> plan to wean likely in the setting of bladder cancer  v/q scan showing high probability of PE in the lateral basal segment of right lower lobe and anterior segment of right upper   lobe.  - lovenox 80mg bid

## 2019-08-01 NOTE — DISCHARGE NOTE PROVIDER - PROVIDER TOKENS
PROVIDER:[TOKEN:[[id number]],FOLLOWUP:[1 week]] PROVIDER:[TOKEN:[[id number]],FOLLOWUP:[1 week]],PROVIDER:[TOKEN:[[id number]]]

## 2019-08-01 NOTE — PROGRESS NOTE ADULT - SUBJECTIVE AND OBJECTIVE BOX
Christian Enamorado  MS4/Internal Medicine    NICOLLE COBB  79y  Male      Patient is a 79y old  Male who presents with a chief complaint of Shortness of Breath (01 Aug 2019 08:57)      INTERVAL HPI/OVERNIGHT EVENTS: No acute events overnight. Pt is calm and comfortable. He had regular bowel movement overnight and has been urinating appropriately. He is able to eat and drink as usual. He believes the swelling of his legs has gone down. Denies any SOB, dizziness, CP, abdominal pain, cough. Claims to be doing much better overall.     MEDICATIONS  (STANDING):  aspirin enteric coated 81 milliGRAM(s) Oral daily  dextrose 5%. 1000 milliLiter(s) (50 mL/Hr) IV Continuous <Continuous>  dextrose 50% Injectable 12.5 Gram(s) IV Push once  dextrose 50% Injectable 25 Gram(s) IV Push once  dextrose 50% Injectable 25 Gram(s) IV Push once  enoxaparin Injectable 80 milliGRAM(s) SubCutaneous two times a day  finasteride 5 milliGRAM(s) Oral daily  insulin lispro (HumaLOG) corrective regimen sliding scale   SubCutaneous three times a day before meals  insulin lispro (HumaLOG) corrective regimen sliding scale   SubCutaneous at bedtime  metoprolol tartrate 25 milliGRAM(s) Oral two times a day  simvastatin 20 milliGRAM(s) Oral at bedtime  tamsulosin 0.4 milliGRAM(s) Oral at bedtime    MEDICATIONS  (PRN):  dextrose 40% Gel 15 Gram(s) Oral once PRN Blood Glucose LESS THAN 70 milliGRAM(s)/deciliter  glucagon  Injectable 1 milliGRAM(s) IntraMuscular once PRN Glucose LESS THAN 70 milligrams/deciliter      Vital Signs Last 24 Hrs  T(C): 37.1 (01 Aug 2019 04:48), Max: 37.1 (31 Jul 2019 15:29)  T(F): 98.8 (01 Aug 2019 04:48), Max: 98.8 (01 Aug 2019 04:48)  HR: 81 (01 Aug 2019 04:48) (70 - 84)  BP: 136/82 (01 Aug 2019 04:48) (104/76 - 136/82)  BP(mean): --  RR: 18 (01 Aug 2019 04:48) (17 - 20)  SpO2: 99% (01 Aug 2019 04:48) (94% - 100%)    PHYSICAL EXAM:  GENERAL: NAD, well-groomed, well-developed  HEAD:  Atraumatic, Normocephalic  EYES: EOMI, PERRLA, conjunctiva and sclera clear  ENMT: Moist mucous membranes  NECK: Supple, No JVD  NERVOUS SYSTEM:  Alert & Oriented X3  CHEST/LUNG: Clear to auscultation bilaterally; No rales, rhonchi, wheezing, or rubs  HEART: Rirregularly irregular/ no murmurs, rubs, or gallops  ABDOMEN: Soft, Nontender, Nondistended; Bowel sounds present  EXTREMITIES:  2+ Peripheral Pulses, No clubbing, cyanosis, or edema  SKIN: bilateral lower leg stasis dermatitis     Consultant(s) Notes Reviewed:  [x ] YES  [ ] NO  Care Discussed with Consultants/Other Providers [ x] YES  [ ] NO    LABS:                        12.2   5.6   )-----------( 163      ( 01 Aug 2019 07:19 )             37.2     08-01    140  |  97  |  39<H>  ----------------------------<  116<H>  3.6   |  30  |  1.90<H>    Ca    9.3      01 Aug 2019 07:18  Phos  5.2     08-01  Mg     2.2     08-01    TPro  6.5  /  Alb  3.8  /  TBili  0.4  /  DBili  x   /  AST  19  /  ALT  25  /  AlkPhos  60  08-01          CAPILLARY BLOOD GLUCOSE      POCT Blood Glucose.: 137 mg/dL (31 Jul 2019 21:35)      RADIOLOGY & ADDITIONAL TESTS:  < from: [facility] Duplex Lower Ext Vein Scan, Bilat (07.31.19 @ 09:41) >  FINDINGS:    There is normal compressibility of the bilateral common femoral, femoral   and popliteal veins.     Doppler examination shows normal spontaneous and phasic flow.    No calf vein thrombosis is detected.    Occlusive thrombus is detected within the left greater saphenous vein   consistent with history of prior ablation.    IMPRESSION:     No evidence of deep venous thrombosis in either lower extremity.    < end of copied text >  < from: [facility] Pulmonary Ventilation/Perfusion Scan (07.31.19 @ 18:00) >  FINDINGS: There are two segmental, mismatched VQ defects in the lateral   basal segment of right lower lobe and anterior segment of right upper   lobe.There is heterogenous tracer distribution the remainder of the   lungs, with elevation of the left hemidiaphragm.    IMPRESSION: Abnormal ventilation/perfusion lung scan. High probability of   pulmonary embolus.    < end of copied text >        Imaging Personally Reviewed:  [ ] YES  [ ] NO Christian Enamorado  MS4/Internal Medicine    NICOLLE CBOB  79y  Male      Patient is a 79y old  Male who presents with a chief complaint of Shortness of Breath (01 Aug 2019 08:57)      INTERVAL HPI/OVERNIGHT EVENTS: No acute events overnight. Pt is calm and comfortable. He had regular bowel movement overnight and has been urinating appropriately. He is able to eat and drink as usual. He believes the swelling of his legs has gone down. Denies any SOB, dizziness, CP, abdominal pain, cough. Claims to be doing much better overall. Feels better with some oxygen by nasal cannula.     MEDICATIONS  (STANDING):  aspirin enteric coated 81 milliGRAM(s) Oral daily  dextrose 5%. 1000 milliLiter(s) (50 mL/Hr) IV Continuous <Continuous>  dextrose 50% Injectable 12.5 Gram(s) IV Push once  dextrose 50% Injectable 25 Gram(s) IV Push once  dextrose 50% Injectable 25 Gram(s) IV Push once  enoxaparin Injectable 80 milliGRAM(s) SubCutaneous two times a day  finasteride 5 milliGRAM(s) Oral daily  insulin lispro (HumaLOG) corrective regimen sliding scale   SubCutaneous three times a day before meals  insulin lispro (HumaLOG) corrective regimen sliding scale   SubCutaneous at bedtime  metoprolol tartrate 25 milliGRAM(s) Oral two times a day  simvastatin 20 milliGRAM(s) Oral at bedtime  tamsulosin 0.4 milliGRAM(s) Oral at bedtime    MEDICATIONS  (PRN):  dextrose 40% Gel 15 Gram(s) Oral once PRN Blood Glucose LESS THAN 70 milliGRAM(s)/deciliter  glucagon  Injectable 1 milliGRAM(s) IntraMuscular once PRN Glucose LESS THAN 70 milligrams/deciliter      MEDICATIONS  (PRN):  dextrose 40% Gel 15 Gram(s) Oral once PRN Blood Glucose LESS THAN 70 milliGRAM(s)/deciliter  glucagon  Injectable 1 milliGRAM(s) IntraMuscular once PRN Glucose LESS THAN 70 milligrams/deciliter      Vital Signs Last 24 Hrs  T(C): 37.1 (01 Aug 2019 04:48), Max: 37.1 (31 Jul 2019 15:29)  T(F): 98.8 (01 Aug 2019 04:48), Max: 98.8 (01 Aug 2019 04:48)  HR: 81 (01 Aug 2019 04:48) (70 - 84)  BP: 136/82 (01 Aug 2019 04:48) (104/76 - 136/82)  BP(mean): --  RR: 18 (01 Aug 2019 04:48) (17 - 20)  SpO2: 99% (01 Aug 2019 04:48) (94% - 100%)    PHYSICAL EXAM:  GENERAL: NAD, well-groomed, well-developed  HEAD:  Atraumatic, Normocephalic  EYES: EOMI, PERRLA, conjunctiva and sclera clear  ENMT: Moist mucous membranes  NECK: Supple, No JVD  NERVOUS SYSTEM:  Alert & Oriented X3  CHEST/LUNG: Clear to auscultation bilaterally; No rales, rhonchi, wheezing, or rubs  HEART: Rirregularly irregular/ no murmurs, rubs, or gallops  ABDOMEN: Soft, Nontender, Nondistended; Bowel sounds present  EXTREMITIES:  2+ Peripheral Pulses, No clubbing, cyanosis, or edema  SKIN: bilateral lower leg stasis dermatitis     Consultant(s) Notes Reviewed:  [x ] YES  [ ] NO  Care Discussed with Consultants/Other Providers [ x] YES  [ ] NO    LABS:                        12.2   5.6   )-----------( 163      ( 01 Aug 2019 07:19 )             37.2     08-01    140  |  97  |  39<H>  ----------------------------<  116<H>  3.6   |  30  |  1.90<H>    Ca    9.3      01 Aug 2019 07:18  Phos  5.2     08-01  Mg     2.2     08-01    TPro  6.5  /  Alb  3.8  /  TBili  0.4  /  DBili  x   /  AST  19  /  ALT  25  /  AlkPhos  60  08-01          CAPILLARY BLOOD GLUCOSE      POCT Blood Glucose.: 137 mg/dL (31 Jul 2019 21:35)      RADIOLOGY & ADDITIONAL TESTS:  < from: [facility] Duplex Lower Ext Vein Scan, Bilat (07.31.19 @ 09:41) >  FINDINGS:    There is normal compressibility of the bilateral common femoral, femoral   and popliteal veins.     Doppler examination shows normal spontaneous and phasic flow.    No calf vein thrombosis is detected.    Occlusive thrombus is detected within the left greater saphenous vein   consistent with history of prior ablation.    IMPRESSION:     No evidence of deep venous thrombosis in either lower extremity.    < end of copied text >  < from: [facility] Pulmonary Ventilation/Perfusion Scan (07.31.19 @ 18:00) >  FINDINGS: There are two segmental, mismatched VQ defects in the lateral   basal segment of right lower lobe and anterior segment of right upper   lobe.There is heterogenous tracer distribution the remainder of the   lungs, with elevation of the left hemidiaphragm.    IMPRESSION: Abnormal ventilation/perfusion lung scan. High probability of   pulmonary embolus.    < end of copied text >        Imaging Personally Reviewed:  [ ] YES  [ ] NO Christian Enamorado  MS4/Internal Medicine    NICOLLE COBB  79y  Male      Patient is a 79y old  Male who presents with a chief complaint of Shortness of Breath (01 Aug 2019 08:57)      INTERVAL HPI/OVERNIGHT EVENTS: No acute events overnight. Pt is calm and comfortable. He had regular bowel movement overnight and has been urinating appropriately. He is able to eat and drink as usual. He believes the swelling of his legs has gone down. Denies any SOB, dizziness, CP, abdominal pain, cough. Claims to be doing much better overall. Feels better with some oxygen by nasal cannula.     MEDICATIONS  (STANDING):  aspirin enteric coated 81 milliGRAM(s) Oral daily  dextrose 5%. 1000 milliLiter(s) (50 mL/Hr) IV Continuous <Continuous>  dextrose 50% Injectable 12.5 Gram(s) IV Push once  dextrose 50% Injectable 25 Gram(s) IV Push once  dextrose 50% Injectable 25 Gram(s) IV Push once  enoxaparin Injectable 80 milliGRAM(s) SubCutaneous two times a day  finasteride 5 milliGRAM(s) Oral daily  insulin lispro (HumaLOG) corrective regimen sliding scale   SubCutaneous three times a day before meals  insulin lispro (HumaLOG) corrective regimen sliding scale   SubCutaneous at bedtime  metoprolol tartrate 25 milliGRAM(s) Oral two times a day  simvastatin 20 milliGRAM(s) Oral at bedtime  tamsulosin 0.4 milliGRAM(s) Oral at bedtime    MEDICATIONS  (PRN):  dextrose 40% Gel 15 Gram(s) Oral once PRN Blood Glucose LESS THAN 70 milliGRAM(s)/deciliter  glucagon  Injectable 1 milliGRAM(s) IntraMuscular once PRN Glucose LESS THAN 70 milligrams/deciliter      MEDICATIONS  (PRN):  dextrose 40% Gel 15 Gram(s) Oral once PRN Blood Glucose LESS THAN 70 milliGRAM(s)/deciliter  glucagon  Injectable 1 milliGRAM(s) IntraMuscular once PRN Glucose LESS THAN 70 milligrams/deciliter      Vital Signs Last 24 Hrs  T(C): 37.1 (01 Aug 2019 04:48), Max: 37.1 (31 Jul 2019 15:29)  T(F): 98.8 (01 Aug 2019 04:48), Max: 98.8 (01 Aug 2019 04:48)  HR: 81 (01 Aug 2019 04:48) (70 - 84)  BP: 136/82 (01 Aug 2019 04:48) (104/76 - 136/82)  BP(mean): --  RR: 18 (01 Aug 2019 04:48) (17 - 20)  SpO2: 99% (01 Aug 2019 04:48) (94% - 100%)    PHYSICAL EXAM:  GENERAL: NAD, well-groomed, well-developed  HEAD:  Atraumatic, Normocephalic  NERVOUS SYSTEM:  Alert & Oriented X3  CHEST/LUNG: Clear to auscultation bilaterally; No rales, rhonchi, wheezing, or rubs  HEART: irregularly irregular/ no murmurs, rubs, or gallops  ABDOMEN: Soft, Nontender, Nondistended; Bowel sounds present  EXTREMITIES:  2+ Peripheral Pulses, No clubbing, cyanosis, or edema  SKIN: bilateral lower leg stasis dermatitis     Consultant(s) Notes Reviewed:  [x ] YES  [ ] NO  Care Discussed with Consultants/Other Providers [ x] YES  [ ] NO    LABS:                        12.2   5.6   )-----------( 163      ( 01 Aug 2019 07:19 )             37.2     08-01    140  |  97  |  39<H>  ----------------------------<  116<H>  3.6   |  30  |  1.90<H>    Ca    9.3      01 Aug 2019 07:18  Phos  5.2     08-01  Mg     2.2     08-01    TPro  6.5  /  Alb  3.8  /  TBili  0.4  /  DBili  x   /  AST  19  /  ALT  25  /  AlkPhos  60  08-01          CAPILLARY BLOOD GLUCOSE      POCT Blood Glucose.: 137 mg/dL (31 Jul 2019 21:35)      RADIOLOGY & ADDITIONAL TESTS:  < from: [facility] Duplex Lower Ext Vein Scan, Bilat (07.31.19 @ 09:41) >  FINDINGS:    There is normal compressibility of the bilateral common femoral, femoral   and popliteal veins.     Doppler examination shows normal spontaneous and phasic flow.    No calf vein thrombosis is detected.    Occlusive thrombus is detected within the left greater saphenous vein   consistent with history of prior ablation.    IMPRESSION:     No evidence of deep venous thrombosis in either lower extremity.    < end of copied text >  < from: [facility] Pulmonary Ventilation/Perfusion Scan (07.31.19 @ 18:00) >  FINDINGS: There are two segmental, mismatched VQ defects in the lateral   basal segment of right lower lobe and anterior segment of right upper   lobe.There is heterogenous tracer distribution the remainder of the   lungs, with elevation of the left hemidiaphragm.    IMPRESSION: Abnormal ventilation/perfusion lung scan. High probability of   pulmonary embolus.    < end of copied text >        Imaging Personally Reviewed:  [ ] YES  [ ] NO

## 2019-08-01 NOTE — PROGRESS NOTE ADULT - PROBLEM SELECTOR PLAN 4
- Hx of CKD II  - monitor creatinine trend (1.90<--2.02)  - avoid nephrotoxic agents  - renally dose medications - Hx of CKD II  - improving  - avoid nephrotoxic agents  - renally dose medications

## 2019-08-01 NOTE — DISCHARGE NOTE PROVIDER - HOSPITAL COURSE
79 year old male with PMH significant for HTN, HLD, DMII, CKD II, CVA, and bladder cancer presented to the ED with acute onset shortness for about 2 days. Upon admission, patient was found to have atrial fibrillation, elevated D-Dimer, and elevated pro-BNP. Duplex US for lower legs negative for DVT. With high suspicion for pulmonary embolism, a V/Q scan was performed on 7/31/19 in lieu of a CTA due to elevated creatinine. The V/Q scan indicated a high probability for PE. The PE was treated using Lovenox 80mg bid, which also served as anticoagulation in the setting of CHADS-Vasc score of 5. Echocardiogram performed on 8/2/19 showed ____. Patient was weaned off nasal canula. Patient has been evaluated prior to discharge by physical therapy and PT recommended ______. Patient is stable upon discharge. 79 year old male with PMH significant for HTN, HLD, DMII, CKD II, CVA, and bladder cancer presented to the ED with acute onset shortness for about 2 days. Upon admission, patient was found to have atrial fibrillation, elevated D-Dimer, and elevated pro-BNP. Duplex US for lower legs negative for DVT. With high suspicion for pulmonary embolism, a V/Q scan was performed on 7/31/19 in lieu of a CTA due to elevated creatinine. The V/Q scan indicated a high probability for PE. The PE was treated using Lovenox 80mg bid, which also served as anticoagulation in the setting of CHADS-Vasc score of 5. Echocardiogram performed on 8/1/19 showed concentric ventricular remodeling w/ intact RV/LV function with EF of 60-65%. Patient was weaned off nasal canula. Patient has been evaluated prior to discharge by physical therapy and PT recommended outpatient PT services 3-4x a week for 1 month. Patient is well upon examination and ready for discharge. 79 year old male with PMH significant for HTN, HLD, DMII, CKD II, CVA, and bladder cancer presented to the ED with acute onset shortness for about 2 days. Upon admission, patient was found to have atrial fibrillation, elevated D-Dimer, and elevated pro-BNP. Duplex US for lower legs negative for DVT. With high suspicion for pulmonary embolism, a V/Q scan was performed on 7/31/19 in lieu of a CTA due to elevated creatinine. The V/Q scan indicated a high probability for PE. The PE was treated using Lovenox 80mg bid, which also served as anticoagulation in the setting of CHADS-Vasc score of 5. Echocardiogram performed on 8/1/19 showed concentric ventricular remodeling w/ intact RV/LV function with EF of 60-65%. Patient was weaned off nasal canula. Patient has been evaluated prior to discharge by physical therapy and PT recommended outpatient PT services 3-4x a week for 1 month. Patient is well upon examination and ready for discharge.            CHF was ruled out

## 2019-08-01 NOTE — PROGRESS NOTE ADULT - PROBLEM SELECTOR PLAN 3
- Get coags and start anticoagulation (enoxaprin 80mg bid)  - CHADS-Vasc 5 - on AC for PE  - CHADS-Vasc 5

## 2019-08-02 ENCOUNTER — TRANSCRIPTION ENCOUNTER (OUTPATIENT)
Age: 79
End: 2019-08-02

## 2019-08-02 VITALS
OXYGEN SATURATION: 96 % | RESPIRATION RATE: 18 BRPM | DIASTOLIC BLOOD PRESSURE: 77 MMHG | SYSTOLIC BLOOD PRESSURE: 126 MMHG | TEMPERATURE: 98 F | HEART RATE: 74 BPM

## 2019-08-02 LAB
ANION GAP SERPL CALC-SCNC: 14 MMOL/L — SIGNIFICANT CHANGE UP (ref 5–17)
BUN SERPL-MCNC: 41 MG/DL — HIGH (ref 7–23)
CALCIUM SERPL-MCNC: 9.6 MG/DL — SIGNIFICANT CHANGE UP (ref 8.4–10.5)
CHLORIDE SERPL-SCNC: 97 MMOL/L — SIGNIFICANT CHANGE UP (ref 96–108)
CO2 SERPL-SCNC: 31 MMOL/L — SIGNIFICANT CHANGE UP (ref 22–31)
CREAT SERPL-MCNC: 1.84 MG/DL — HIGH (ref 0.5–1.3)
GLUCOSE SERPL-MCNC: 118 MG/DL — HIGH (ref 70–99)
HCT VFR BLD CALC: 36.2 % — LOW (ref 39–50)
HGB BLD-MCNC: 12.7 G/DL — LOW (ref 13–17)
INR BLD: 1.11 RATIO — SIGNIFICANT CHANGE UP (ref 0.88–1.16)
MAGNESIUM SERPL-MCNC: 2.2 MG/DL — SIGNIFICANT CHANGE UP (ref 1.6–2.6)
MCHC RBC-ENTMCNC: 30 PG — SIGNIFICANT CHANGE UP (ref 27–34)
MCHC RBC-ENTMCNC: 35 GM/DL — SIGNIFICANT CHANGE UP (ref 32–36)
MCV RBC AUTO: 85.7 FL — SIGNIFICANT CHANGE UP (ref 80–100)
PHOSPHATE SERPL-MCNC: 4.2 MG/DL — SIGNIFICANT CHANGE UP (ref 2.5–4.5)
PLATELET # BLD AUTO: 152 K/UL — SIGNIFICANT CHANGE UP (ref 150–400)
POTASSIUM SERPL-MCNC: 3.4 MMOL/L — LOW (ref 3.5–5.3)
POTASSIUM SERPL-SCNC: 3.4 MMOL/L — LOW (ref 3.5–5.3)
PROTHROM AB SERPL-ACNC: 12.8 SEC — SIGNIFICANT CHANGE UP (ref 10–12.9)
RBC # BLD: 4.22 M/UL — SIGNIFICANT CHANGE UP (ref 4.2–5.8)
RBC # FLD: 12.4 % — SIGNIFICANT CHANGE UP (ref 10.3–14.5)
SODIUM SERPL-SCNC: 142 MMOL/L — SIGNIFICANT CHANGE UP (ref 135–145)
WBC # BLD: 6 K/UL — SIGNIFICANT CHANGE UP (ref 3.8–10.5)
WBC # FLD AUTO: 6 K/UL — SIGNIFICANT CHANGE UP (ref 3.8–10.5)

## 2019-08-02 PROCEDURE — 99239 HOSP IP/OBS DSCHRG MGMT >30: CPT

## 2019-08-02 RX ORDER — SIMVASTATIN 20 MG/1
1 TABLET, FILM COATED ORAL
Qty: 0 | Refills: 0 | DISCHARGE
Start: 2019-08-02

## 2019-08-02 RX ORDER — ENOXAPARIN SODIUM 100 MG/ML
80 INJECTION SUBCUTANEOUS
Qty: 4 | Refills: 0
Start: 2019-08-02 | End: 2019-08-03

## 2019-08-02 RX ORDER — LOSARTAN POTASSIUM 100 MG/1
1 TABLET, FILM COATED ORAL
Qty: 0 | Refills: 0 | DISCHARGE

## 2019-08-02 RX ORDER — ENOXAPARIN SODIUM 100 MG/ML
80 INJECTION SUBCUTANEOUS
Qty: 24 | Refills: 0
Start: 2019-08-02 | End: 2019-08-13

## 2019-08-02 RX ORDER — ENOXAPARIN SODIUM 100 MG/ML
80 INJECTION SUBCUTANEOUS
Qty: 24 | Refills: 1
Start: 2019-08-02 | End: 2019-09-30

## 2019-08-02 RX ORDER — ALLOPURINOL 300 MG
1 TABLET ORAL
Qty: 0 | Refills: 0 | DISCHARGE

## 2019-08-02 RX ORDER — POTASSIUM CHLORIDE 20 MEQ
20 PACKET (EA) ORAL
Refills: 0 | Status: COMPLETED | OUTPATIENT
Start: 2019-08-02 | End: 2019-08-02

## 2019-08-02 RX ORDER — ENOXAPARIN SODIUM 100 MG/ML
80 INJECTION SUBCUTANEOUS
Qty: 6 | Refills: 1
Start: 2019-08-02 | End: 2019-08-07

## 2019-08-02 RX ORDER — ALLOPURINOL 300 MG
1 TABLET ORAL
Qty: 60 | Refills: 2
Start: 2019-08-02 | End: 2019-10-30

## 2019-08-02 RX ORDER — SIMVASTATIN 20 MG/1
1 TABLET, FILM COATED ORAL
Qty: 0 | Refills: 0 | DISCHARGE

## 2019-08-02 RX ORDER — ENOXAPARIN SODIUM 100 MG/ML
80 INJECTION SUBCUTANEOUS
Qty: 120 | Refills: 1
Start: 2019-08-02 | End: 2019-11-29

## 2019-08-02 RX ORDER — FEBUXOSTAT 40 MG/1
1 TABLET ORAL
Qty: 30 | Refills: 0
Start: 2019-08-02 | End: 2019-08-31

## 2019-08-02 RX ORDER — TRIAMTERENE/HYDROCHLOROTHIAZID 75 MG-50MG
1 TABLET ORAL
Qty: 0 | Refills: 0 | DISCHARGE

## 2019-08-02 RX ADMIN — ENOXAPARIN SODIUM 80 MILLIGRAM(S): 100 INJECTION SUBCUTANEOUS at 17:02

## 2019-08-02 RX ADMIN — Medication 20 MILLIEQUIVALENT(S): at 14:21

## 2019-08-02 RX ADMIN — Medication 25 MILLIGRAM(S): at 17:01

## 2019-08-02 RX ADMIN — Medication 25 MILLIGRAM(S): at 05:21

## 2019-08-02 RX ADMIN — Medication 20 MILLIEQUIVALENT(S): at 11:02

## 2019-08-02 RX ADMIN — ENOXAPARIN SODIUM 80 MILLIGRAM(S): 100 INJECTION SUBCUTANEOUS at 05:21

## 2019-08-02 RX ADMIN — FINASTERIDE 5 MILLIGRAM(S): 5 TABLET, FILM COATED ORAL at 11:02

## 2019-08-02 RX ADMIN — Medication 81 MILLIGRAM(S): at 11:02

## 2019-08-02 NOTE — PROVIDER CONTACT NOTE (OTHER) - ASSESSMENT
Pt denying SOB, CP, Palpitations or any abnormal s/s.  Pt was ambulating to bathroom at that time, once pt sat down, HR went back to baseline 90s.  VSS and documented on flowsheet.

## 2019-08-02 NOTE — PHYSICAL THERAPY INITIAL EVALUATION ADULT - ADDITIONAL COMMENTS
pt lives in private home with spouse, 2 steps to enter +HR, 10 steps to bedroom +HR. Prior to admission, pt was I with all functional mobility and ADLs with straight cane.

## 2019-08-02 NOTE — PROGRESS NOTE ADULT - PROBLEM SELECTOR PLAN 9
- monitored off of allopurinol in setting of [facility]
- monitored off of allopurinol in setting of [facility]

## 2019-08-02 NOTE — PROGRESS NOTE ADULT - SUBJECTIVE AND OBJECTIVE BOX
Christian Enamorado  MS4/Internal Medicine    NICOLLE COBB  79y  Male      Patient is a 79y old  Male who presents with a chief complaint of Shortness of Breath (02 Aug 2019 08:36)      INTERVAL HPI/OVERNIGHT EVENTS: No acute events overnight. Pt is calm and comfortable. He had regular bowel movement overnight and has been urinating appropriately. He is able to eat and drink as usual. No leg swelling. Denies any SOB, dizziness, CP, abdominal pain, cough. Claims to be doing much better overall. Feels he can tolerate gonig off of supplemental O2.     MEDICATIONS  (STANDING):  aspirin enteric coated 81 milliGRAM(s) Oral daily  dextrose 5%. 1000 milliLiter(s) (50 mL/Hr) IV Continuous <Continuous>  dextrose 50% Injectable 12.5 Gram(s) IV Push once  dextrose 50% Injectable 25 Gram(s) IV Push once  dextrose 50% Injectable 25 Gram(s) IV Push once  enoxaparin Injectable 80 milliGRAM(s) SubCutaneous two times a day  finasteride 5 milliGRAM(s) Oral daily  insulin lispro (HumaLOG) corrective regimen sliding scale   SubCutaneous three times a day before meals  insulin lispro (HumaLOG) corrective regimen sliding scale   SubCutaneous at bedtime  metoprolol tartrate 25 milliGRAM(s) Oral two times a day  simvastatin 20 milliGRAM(s) Oral at bedtime  tamsulosin 0.4 milliGRAM(s) Oral at bedtime    MEDICATIONS  (PRN):  dextrose 40% Gel 15 Gram(s) Oral once PRN Blood Glucose LESS THAN 70 milliGRAM(s)/deciliter  glucagon  Injectable 1 milliGRAM(s) IntraMuscular once PRN Glucose LESS THAN 70 milligrams/deciliter        Vital Signs Last 24 Hrs  T(C): 36.9 (02 Aug 2019 08:04), Max: 37 (01 Aug 2019 18:13)  T(F): 98.5 (02 Aug 2019 08:04), Max: 98.6 (01 Aug 2019 18:13)  HR: 85 (02 Aug 2019 08:04) (67 - 88)  BP: 120/85 (02 Aug 2019 08:04) (118/84 - 152/61)  BP(mean): --  RR: 18 (02 Aug 2019 08:04) (18 - 18)  SpO2: 99% (02 Aug 2019 08:04) (90% - 99%)    PHYSICAL EXAM:  GENERAL: NAD, well-groomed, well-developed  HEAD:  Atraumatic, Normocephalic  NERVOUS SYSTEM:  Alert & Oriented X3  CHEST/LUNG: Clear to auscultation bilaterally; No rales, rhonchi, wheezing, or rubs  HEART: irregularly irregular/ no murmurs, rubs, or gallops  ABDOMEN: Soft, Nontender, Nondistended; Bowel sounds present  EXTREMITIES:  2+ Peripheral Pulses, No clubbing, cyanosis, or edema  SKIN: bilateral lower leg stasis dermatitis     Consultant(s) Notes Reviewed:  [x ] YES  [ ] NO  Care Discussed with Consultants/Other Providers [ x] YES  [ ] NO    LABS:                                   12.2   5.6   )-----------( 163      ( 01 Aug 2019 07:19 )             37.2       08-01    140  |  97  |  39<H>  ----------------------------<  116<H>  3.6   |  30  |  1.90<H>    Ca    9.3      01 Aug 2019 07:18  Phos  5.2     08-01  Mg     2.2     08-01    TPro  6.5  /  Alb  3.8  /  TBili  0.4  /  DBili  x   /  AST  19  /  ALT  25  /  AlkPhos  60  08-01        RADIOLOGY & ADDITIONAL TESTS:  < from: [facility] Duplex Lower Ext Vein Scan, Bilat (07.31.19 @ 09:41) >  FINDINGS:    There is normal compressibility of the bilateral common femoral, femoral   and popliteal veins.     Doppler examination shows normal spontaneous and phasic flow.    No calf vein thrombosis is detected.    Occlusive thrombus is detected within the left greater saphenous vein   consistent with history of prior ablation.    IMPRESSION:     No evidence of deep venous thrombosis in either lower extremity.    < end of copied text >  < from: NM Pulmonary Ventilation/Perfusion Scan (07.31.19 @ 18:00) >  FINDINGS: There are two segmental, mismatched VQ defects in the lateral   basal segment of right lower lobe and anterior segment of right upper   lobe.There is heterogenous tracer distribution the remainder of the   lungs, with elevation of the left hemidiaphragm.    IMPRESSION: Abnormal ventilation/perfusion lung scan. High probability of   pulmonary embolus.    < end of copied text >    < from: Transthoracic Echocardiogram (08.01.19 @ 20:07) >  Conclusions:  1. Increased relative wall thickness with normal left  ventricular mass index, consistent with concentric left  ventricular remodeling.  2. Endocardium not well visualized; grossly normal left  ventricular systolic function.  3. The right ventricle is not well visualized; grossly  normal right ventricular systolic function.  4. Estimated right ventricular systolic pressure equals 36  mm Hg, assuming right atrial pressure equals 8 mm Hg,  consistent with borderline pulmonary hypertension.  *** Compared with echocardiogram of 6/7/2010, no  significant changes noted.    < end of copied text >      Imaging Personally Reviewed:  [ ] YES  [ ] NO Christian Enamorado  MS4/Internal Medicine    NICOLLE COBB  79y  Male      Patient is a 79y old  Male who presents with a chief complaint of Shortness of Breath (02 Aug 2019 08:36)      INTERVAL HPI/OVERNIGHT EVENTS: No acute events overnight. Pt is calm and comfortable. He had regular bowel movement overnight and has been urinating appropriately. He is able to eat and drink as usual. No leg swelling. Denies any SOB, dizziness, CP, abdominal pain, cough. Claims to be doing much better overall. Feels he can tolerate gonig off of supplemental O2.     MEDICATIONS  (STANDING):  aspirin enteric coated 81 milliGRAM(s) Oral daily  dextrose 5%. 1000 milliLiter(s) (50 mL/Hr) IV Continuous <Continuous>  dextrose 50% Injectable 12.5 Gram(s) IV Push once  dextrose 50% Injectable 25 Gram(s) IV Push once  dextrose 50% Injectable 25 Gram(s) IV Push once  enoxaparin Injectable 80 milliGRAM(s) SubCutaneous two times a day  finasteride 5 milliGRAM(s) Oral daily  insulin lispro (HumaLOG) corrective regimen sliding scale   SubCutaneous three times a day before meals  insulin lispro (HumaLOG) corrective regimen sliding scale   SubCutaneous at bedtime  metoprolol tartrate 25 milliGRAM(s) Oral two times a day  simvastatin 20 milliGRAM(s) Oral at bedtime  tamsulosin 0.4 milliGRAM(s) Oral at bedtime    MEDICATIONS  (PRN):  dextrose 40% Gel 15 Gram(s) Oral once PRN Blood Glucose LESS THAN 70 milliGRAM(s)/deciliter  glucagon  Injectable 1 milliGRAM(s) IntraMuscular once PRN Glucose LESS THAN 70 milligrams/deciliter        Vital Signs Last 24 Hrs  T(C): 36.9 (02 Aug 2019 08:04), Max: 37 (01 Aug 2019 18:13)  T(F): 98.5 (02 Aug 2019 08:04), Max: 98.6 (01 Aug 2019 18:13)  HR: 85 (02 Aug 2019 08:04) (67 - 88)  BP: 120/85 (02 Aug 2019 08:04) (118/84 - 152/61)  BP(mean): --  RR: 18 (02 Aug 2019 08:04) (18 - 18)  SpO2: 99% (02 Aug 2019 08:04) (90% - 99%)    PHYSICAL EXAM:  GENERAL: NAD, well-groomed, well-developed  HEAD:  Atraumatic, Normocephalic  NERVOUS SYSTEM:  Alert & Oriented X3  CHEST/LUNG: Clear to auscultation bilaterally; No rales, rhonchi, wheezing, or rubs  HEART: irregularly irregular/ no murmurs, rubs, or gallops  ABDOMEN: Soft, Nontender, Nondistended; Bowel sounds present  EXTREMITIES:  2+ Peripheral Pulses, No clubbing, cyanosis, or edema  SKIN: bilateral lower leg stasis dermatitis     Consultant(s) Notes Reviewed:  [x ] YES  [ ] NO  Care Discussed with Consultants/Other Providers [ x] YES  [ ] NO    LABS:                                      12.7   6.0   )-----------( 152      ( 02 Aug 2019 08:35 )             36.2   08-02    142  |  97  |  41<H>  ----------------------------<  118<H>  3.4<L>   |  31  |  1.84<H>    Ca    9.6      02 Aug 2019 08:35  Phos  4.2     08-02  Mg     2.2     08-02    TPro  6.5  /  Alb  3.8  /  TBili  0.4  /  DBili  x   /  AST  19  /  ALT  25  /  AlkPhos  60  08-01          RADIOLOGY & ADDITIONAL TESTS:  < from: [facility] Duplex Lower Ext Vein Scan, Bilat (07.31.19 @ 09:41) >  FINDINGS:    There is normal compressibility of the bilateral common femoral, femoral   and popliteal veins.     Doppler examination shows normal spontaneous and phasic flow.    No calf vein thrombosis is detected.    Occlusive thrombus is detected within the left greater saphenous vein   consistent with history of prior ablation.    IMPRESSION:     No evidence of deep venous thrombosis in either lower extremity.    < end of copied text >  < from: NM Pulmonary Ventilation/Perfusion Scan (07.31.19 @ 18:00) >  FINDINGS: There are two segmental, mismatched VQ defects in the lateral   basal segment of right lower lobe and anterior segment of right upper   lobe.There is heterogenous tracer distribution the remainder of the   lungs, with elevation of the left hemidiaphragm.    IMPRESSION: Abnormal ventilation/perfusion lung scan. High probability of   pulmonary embolus.    < end of copied text >    < from: Transthoracic Echocardiogram (08.01.19 @ 20:07) >  Conclusions:  1. Increased relative wall thickness with normal left  ventricular mass index, consistent with concentric left  ventricular remodeling.  2. Endocardium not well visualized; grossly normal left  ventricular systolic function.  3. The right ventricle is not well visualized; grossly  normal right ventricular systolic function.  4. Estimated right ventricular systolic pressure equals 36  mm Hg, assuming right atrial pressure equals 8 mm Hg,  consistent with borderline pulmonary hypertension.  *** Compared with echocardiogram of 6/7/2010, no  significant changes noted.    < end of copied text >      Imaging Personally Reviewed:  [ ] YES  [ ] NO

## 2019-08-02 NOTE — DISCHARGE NOTE NURSING/CASE MANAGEMENT/SOCIAL WORK - NSDCPEPTSTRK_GEN_ALL_CORE
Risk factors for stroke/Need for follow up after discharge/Prescribed medications/Stroke education booklet/Call 911 for stroke/Stroke support groups for patients, families, and friends/Stroke warning signs and symptoms/Signs and symptoms of stroke

## 2019-08-02 NOTE — PROGRESS NOTE ADULT - ASSESSMENT
79 year old man w/ past medical history of HTN, HLD, CKD II, CVA, and bladder cancer is presenting with acute onset shortness of breath 2/2 to pulmonary embolism in the setting of new onset atrial fibrillation. Plan for discharge.
79 year old man w/ past medical history of HTN, HLD, CKD II, CVA, and bladder cancer is presenting with acute onset shortness of breath 2/2 to pulmonary embolism in the setting of new onset atrial fibrillation.

## 2019-08-02 NOTE — DISCHARGE NOTE NURSING/CASE MANAGEMENT/SOCIAL WORK - NSDCDPATPORTLINK_GEN_ALL_CORE
You can access the [facility], offered by [facility], by registering with the following website: [email or web address]

## 2019-08-02 NOTE — PROGRESS NOTE ADULT - PROBLEM SELECTOR PLAN 1
likely in the setting of bladder cancer  v/q scan showing high probability of PE in the lateral basal segment of right lower lobe and anterior segment of right upper   lobe.  - lovenox 80mg bid

## 2019-08-02 NOTE — PHYSICAL THERAPY INITIAL EVALUATION ADULT - GAIT DEVIATIONS NOTED, PT EVAL
decreased stride length/decreased weight-shifting ability/decreased velocity of limb motion/decreased mel

## 2019-08-02 NOTE — PHYSICAL THERAPY INITIAL EVALUATION ADULT - PERTINENT HX OF CURRENT PROBLEM, REHAB EVAL
80 yo M p/w moderate SOB, progressively getting worse over the past few days, unable to walk or carry out his usual activities. Pt states exacerbation with walk and sometimes by lying flat and admitts to LE edema for several months. found to have pulmonary embolism in the setting of new onset a fib. [facility] Duplex b/l LE 7/31: Neg. XRay Chest 7/31: Left midlung linear atelectasis, otherwise clear lungs. VQ Scan 7/31: Abnormal ventilation/perfusion lung scan. High probability of pulmonary embolus.

## 2019-08-02 NOTE — PROGRESS NOTE ADULT - ATTENDING COMMENTS
saturating well on room air, 96%  2d echo reviewed  EF 60-65% no segmental wall motion abnormalities/no RV strain  d/c home with urology opt follow up for bladder ca  continue with lovenox

## 2019-08-06 PROBLEM — M10.9 GOUT, UNSPECIFIED: Chronic | Status: ACTIVE | Noted: 2019-07-31

## 2019-08-06 PROBLEM — E11.9 TYPE 2 DIABETES MELLITUS WITHOUT COMPLICATIONS: Chronic | Status: ACTIVE | Noted: 2019-07-31

## 2019-08-09 ENCOUNTER — APPOINTMENT (OUTPATIENT)
Dept: INTERNAL MEDICINE | Facility: CLINIC | Age: 79
End: 2019-08-09
Payer: MEDICARE

## 2019-08-09 ENCOUNTER — NON-APPOINTMENT (OUTPATIENT)
Age: 79
End: 2019-08-09

## 2019-08-09 VITALS
SYSTOLIC BLOOD PRESSURE: 125 MMHG | HEART RATE: 75 BPM | OXYGEN SATURATION: 96 % | TEMPERATURE: 97.6 F | BODY MASS INDEX: 31.22 KG/M2 | WEIGHT: 187.39 LBS | HEIGHT: 64.96 IN | DIASTOLIC BLOOD PRESSURE: 70 MMHG

## 2019-08-09 VITALS — RESPIRATION RATE: 16 BRPM

## 2019-08-09 DIAGNOSIS — K71.6 TOXIC LIVER DISEASE WITH HEPATITIS, NOT ELSEWHERE CLASSIFIED: ICD-10-CM

## 2019-08-09 DIAGNOSIS — T50.905A TOXIC LIVER DISEASE WITH HEPATITIS, NOT ELSEWHERE CLASSIFIED: ICD-10-CM

## 2019-08-09 DIAGNOSIS — M54.5 LOW BACK PAIN: ICD-10-CM

## 2019-08-09 PROCEDURE — G0439: CPT

## 2019-08-09 PROCEDURE — 81003 URINALYSIS AUTO W/O SCOPE: CPT | Mod: QW

## 2019-08-09 PROCEDURE — 93000 ELECTROCARDIOGRAM COMPLETE: CPT

## 2019-08-09 PROCEDURE — 36415 COLL VENOUS BLD VENIPUNCTURE: CPT

## 2019-08-09 RX ORDER — MELOXICAM 15 MG/1
15 TABLET ORAL
Qty: 1 | Refills: 2 | Status: DISCONTINUED | COMMUNITY
Start: 2018-02-07 | End: 2019-08-09

## 2019-08-09 NOTE — REVIEW OF SYSTEMS
[Joint Pain] : joint pain [Lower Ext Edema] : lower extremity edema [Back Pain] : back pain [Negative] : Heme/Lymph [FreeTextEntry5] : prn

## 2019-08-09 NOTE — COUNSELING
[Fall prevention counseling provided] : Fall prevention counseling provided [de-identified] : Low salt and low fat diet

## 2019-08-09 NOTE — PHYSICAL EXAM
[Well Nourished] : well nourished [No Acute Distress] : no acute distress [Well Developed] : well developed [Well-Appearing] : well-appearing [Normal Sclera/Conjunctiva] : normal sclera/conjunctiva [PERRL] : pupils equal round and reactive to light [EOMI] : extraocular movements intact [Normal Oropharynx] : the oropharynx was normal [Normal Outer Ear/Nose] : the outer ears and nose were normal in appearance [Normal TMs] : both tympanic membranes were normal [No JVD] : no jugular venous distention [No Lymphadenopathy] : no lymphadenopathy [Supple] : supple [Thyroid Normal, No Nodules] : the thyroid was normal and there were no nodules present [No Respiratory Distress] : no respiratory distress  [No Accessory Muscle Use] : no accessory muscle use [Clear to Auscultation] : lungs were clear to auscultation bilaterally [Normal Percussion] : the chest was normal to percussion [Regular Rhythm] : with a regular rhythm [Normal Rate] : normal rate  [Normal S1, S2] : normal S1 and S2 [No Murmur] : no murmur heard [No Carotid Bruits] : no carotid bruits [No Abdominal Bruit] : a ~M bruit was not heard ~T in the abdomen [No Varicosities] : no varicosities [Pedal Pulses Present] : the pedal pulses are present [No Edema] : there was no peripheral edema [No Palpable Aorta] : no palpable aorta [No Extremity Clubbing/Cyanosis] : no extremity clubbing/cyanosis [Soft] : abdomen soft [Non Tender] : non-tender [Non-distended] : non-distended [No Masses] : no abdominal mass palpated [No HSM] : no HSM [Normal Bowel Sounds] : normal bowel sounds [No Hernias] : no hernias [Declined Rectal Exam] : declined rectal exam [Normal Posterior Cervical Nodes] : no posterior cervical lymphadenopathy [Normal Anterior Cervical Nodes] : no anterior cervical lymphadenopathy [No CVA Tenderness] : no CVA  tenderness [No Spinal Tenderness] : no spinal tenderness [No Joint Swelling] : no joint swelling [Grossly Normal Strength/Tone] : grossly normal strength/tone [No Rash] : no rash [No Skin Lesions] : no skin lesions [Coordination Grossly Intact] : coordination grossly intact [No Focal Deficits] : no focal deficits [Normal Gait] : normal gait [Deep Tendon Reflexes (DTR)] : deep tendon reflexes were 2+ and symmetric [Speech Grossly Normal] : speech grossly normal [Memory Grossly Normal] : memory grossly normal [Normal Affect] : the affect was normal [Alert and Oriented x3] : oriented to person, place, and time [Normal Mood] : the mood was normal [Normal Insight/Judgement] : insight and judgment were intact [Normal Voice/Communication] : normal voice/communication [Normal Nasal Mucosa] : the nasal mucosa was normal [No Axillary Lymphadenopathy] : no axillary lymphadenopathy [Normal Supraclavicular Nodes] : no supraclavicular lymphadenopathy [Normal Axillary Nodes] : no axillary lymphadenopathy [Normal Inguinal Nodes] : no inguinal lymphadenopathy [Normal Femoral Nodes] : no femoral lymphadenopathy [de-identified] : refused.f/u by urologist

## 2019-08-09 NOTE — HISTORY OF PRESENT ILLNESS
[FreeTextEntry1] : Patient comes for his Medicare annual wellness visit, medical clearance and followup of his chronic medical problems. Patient had pulmonary embolism approximately one week ago and he is on Lovenox subcutaneously twice a day. Patient is going to have biopsy  of his urinary bladder [;recurrent cancer] by  ,tel [phone],F [phone]. The patient feels well without any chest pain, shortness of breath, paroxysmal nocturnal dyspnea orthopnea. Only he has swelling of his lower legs p.r.n. and diuretic in the hospital recently helped. Patient has also pain in his entire spine ,chronic secondary to arthritis. He has also  pain of the left hip to the left lower extremity secondary to arthritis of the left hip.

## 2019-08-09 NOTE — HEALTH RISK ASSESSMENT
[Good] : ~his/her~ current health as good [Very Good] : ~his/her~  mood as very good [No] : No [No falls in past year] : Patient reported no falls in the past year [With Family] : lives with family [None] : None [High School] : high school [Retired] : retired [] :  [Feels Safe at Home] : Feels safe at home [Fully functional (bathing, dressing, toileting, transferring, walking, feeding)] : Fully functional (bathing, dressing, toileting, transferring, walking, feeding) [Fully functional (using the telephone, shopping, preparing meals, housekeeping, doing laundry, using] : Fully functional and needs no help or supervision to perform IADLs (using the telephone, shopping, preparing meals, housekeeping, doing laundry, using transportation, managing medications and managing finances) [Smoke Detector] : smoke detector [Carbon Monoxide Detector] : carbon monoxide detector [Safety elements used in home] : safety elements used in home [Seat Belt] :  uses seat belt [Sunscreen] : uses sunscreen [FreeTextEntry1] : health [] : No [Change in mental status noted] : No change in mental status noted [Sexually Active] : not sexually active [Reports changes in hearing] : Reports no changes in hearing [Reports changes in vision] : Reports no changes in vision [Reports changes in dental health] : Reports no changes in dental health [TB Exposure] : is not being exposed to tuberculosis [Caregiver Concerns] : does not have caregiver concerns [AdvancecareDate] : 8-9-19

## 2019-08-09 NOTE — ASSESSMENT
[FreeTextEntry1] : Diagnoses #1 patient had  pulmonary embolism 1 week and he  is on Lovenox.I spoke to  patient's urologist to postpone the operation for several weeks.\par #2 hypertension, stable. Continue same treatment and low salt diet.\par #3 adult-onset diabetes. Diet emphasized to patient.\par #4 hyperlipidemia. Continue with same medication and low-fat diet.\par #5 monitor liver toxicity due to chronic medication use\par #6 hypovitaminosis D., being supplemented\par #7 history of gout. 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 Patient to be on furosemide 40 mg p.o. 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 Patient have physical therapy of his spine and left hip.\par #10 history of pulmonary embolism. Patient is followed by the specialist  doctor\par #11 new onset atrial  fibrillation. Heart rate is controlled well and patient is on anticoagulation. Patient had echocardiogram recently\par Plan. Patient advised to return after 2 months

## 2019-08-13 LAB
25(OH)D3 SERPL-MCNC: 66.4 NG/ML
ALBUMIN SERPL ELPH-MCNC: 4.2 G/DL
ALP BLD-CCNC: 59 U/L
ALT SERPL-CCNC: 58 U/L
ANION GAP SERPL CALC-SCNC: 14 MMOL/L
APPEARANCE: CLEAR
APTT BLD: 41.3 SEC
AST SERPL-CCNC: 45 U/L
BACTERIA: NEGATIVE
BASOPHILS # BLD AUTO: 0.03 K/UL
BASOPHILS NFR BLD AUTO: 0.8 %
BILIRUB SERPL-MCNC: 0.2 MG/DL
BILIRUBIN URINE: NEGATIVE
BLOOD URINE: NEGATIVE
BUN SERPL-MCNC: 29 MG/DL
CALCIUM SERPL-MCNC: 9.3 MG/DL
CHLORIDE SERPL-SCNC: 94 MMOL/L
CHOLEST SERPL-MCNC: 93 MG/DL
CHOLEST/HDLC SERPL: 3.1 RATIO
CO2 SERPL-SCNC: 27 MMOL/L
COLOR: NORMAL
CREAT SERPL-MCNC: 1.72 MG/DL
CREAT SPEC-SCNC: 88 MG/DL
EOSINOPHIL # BLD AUTO: 0.12 K/UL
EOSINOPHIL NFR BLD AUTO: 3.1 %
ESTIMATED AVERAGE GLUCOSE: 140 MG/DL
GLUCOSE QUALITATIVE U: NEGATIVE
GLUCOSE SERPL-MCNC: 118 MG/DL
HBA1C MFR BLD HPLC: 6.5 %
HCT VFR BLD CALC: 36.2 %
HDLC SERPL-MCNC: 30 MG/DL
HGB BLD-MCNC: 11.6 G/DL
HYALINE CASTS: 0 /LPF
IMM GRANULOCYTES NFR BLD AUTO: 0.3 %
INR PPP: 1.11 RATIO
KETONES URINE: NEGATIVE
LDLC SERPL CALC-MCNC: 35 MG/DL
LEUKOCYTE ESTERASE URINE: NEGATIVE
LYMPHOCYTES # BLD AUTO: 1.22 K/UL
LYMPHOCYTES NFR BLD AUTO: 31.5 %
MAN DIFF?: NORMAL
MCHC RBC-ENTMCNC: 28 PG
MCHC RBC-ENTMCNC: 32 GM/DL
MCV RBC AUTO: 87.4 FL
MICROALBUMIN 24H UR DL<=1MG/L-MCNC: 12.3 MG/DL
MICROALBUMIN/CREAT 24H UR-RTO: 139 MG/G
MICROSCOPIC-UA: NORMAL
MONOCYTES # BLD AUTO: 0.42 K/UL
MONOCYTES NFR BLD AUTO: 10.9 %
NEUTROPHILS # BLD AUTO: 2.07 K/UL
NEUTROPHILS NFR BLD AUTO: 53.4 %
NITRITE URINE: NEGATIVE
PH URINE: 6
PLATELET # BLD AUTO: 147 K/UL
POTASSIUM SERPL-SCNC: 4 MMOL/L
PROT SERPL-MCNC: 6.5 G/DL
PROTEIN URINE: ABNORMAL
PT BLD: 12.6 SEC
RBC # BLD: 4.14 M/UL
RBC # FLD: 13.6 %
RED BLOOD CELLS URINE: 1 /HPF
SODIUM SERPL-SCNC: 135 MMOL/L
SPECIFIC GRAVITY URINE: 1.01
SQUAMOUS EPITHELIAL CELLS: 0 /HPF
TRIGL SERPL-MCNC: 140 MG/DL
URATE SERPL-MCNC: 8.6 MG/DL
UROBILINOGEN URINE: NORMAL
WBC # FLD AUTO: 3.87 K/UL
WHITE BLOOD CELLS URINE: 4 /HPF

## 2019-08-14 ENCOUNTER — RX RENEWAL (OUTPATIENT)
Age: 79
End: 2019-08-14

## 2019-08-14 ENCOUNTER — APPOINTMENT (OUTPATIENT)
Dept: INTERNAL MEDICINE | Facility: CLINIC | Age: 79
End: 2019-08-14
Payer: MEDICARE

## 2019-08-14 PROCEDURE — 36415 COLL VENOUS BLD VENIPUNCTURE: CPT

## 2019-08-16 LAB
ALBUMIN SERPL ELPH-MCNC: 4 G/DL
ALP BLD-CCNC: 56 U/L
ALT SERPL-CCNC: 103 U/L
AST SERPL-CCNC: 64 U/L
BILIRUB DIRECT SERPL-MCNC: 0.1 MG/DL
BILIRUB INDIRECT SERPL-MCNC: 0.2 MG/DL
BILIRUB SERPL-MCNC: 0.4 MG/DL
FERRITIN SERPL-MCNC: 115 NG/ML
HAV IGM SER QL: NONREACTIVE
HBV CORE IGM SER QL: NONREACTIVE
HBV SURFACE AG SER QL: NONREACTIVE
HCV AB SER QL: NONREACTIVE
HCV S/CO RATIO: 0.12 S/CO
IRON SATN MFR SERPL: 15 %
IRON SERPL-MCNC: 43 UG/DL
PROT SERPL-MCNC: 6.6 G/DL
TIBC SERPL-MCNC: 285 UG/DL
UIBC SERPL-MCNC: 242 UG/DL

## 2019-08-27 ENCOUNTER — MESSAGE (OUTPATIENT)
Age: 79
End: 2019-08-27

## 2019-08-28 ENCOUNTER — MESSAGE (OUTPATIENT)
Age: 79
End: 2019-08-28

## 2019-09-16 ENCOUNTER — RX RENEWAL (OUTPATIENT)
Age: 79
End: 2019-09-16

## 2019-09-19 PROCEDURE — A9540: CPT

## 2019-09-19 PROCEDURE — 83880 ASSAY OF NATRIURETIC PEPTIDE: CPT

## 2019-09-19 PROCEDURE — 96372 THER/PROPH/DIAG INJ SC/IM: CPT

## 2019-09-19 PROCEDURE — 80061 LIPID PANEL: CPT

## 2019-09-19 PROCEDURE — 83735 ASSAY OF MAGNESIUM: CPT

## 2019-09-19 PROCEDURE — 97161 PT EVAL LOW COMPLEX 20 MIN: CPT

## 2019-09-19 PROCEDURE — 82962 GLUCOSE BLOOD TEST: CPT

## 2019-09-19 PROCEDURE — 83036 HEMOGLOBIN GLYCOSYLATED A1C: CPT

## 2019-09-19 PROCEDURE — 99285 EMERGENCY DEPT VISIT HI MDM: CPT | Mod: 25

## 2019-09-19 PROCEDURE — 78582 LUNG VENTILAT&PERFUS IMAGING: CPT

## 2019-09-19 PROCEDURE — 80053 COMPREHEN METABOLIC PANEL: CPT

## 2019-09-19 PROCEDURE — 93005 ELECTROCARDIOGRAM TRACING: CPT

## 2019-09-19 PROCEDURE — 93306 TTE W/DOPPLER COMPLETE: CPT

## 2019-09-19 PROCEDURE — 93970 EXTREMITY STUDY: CPT

## 2019-09-19 PROCEDURE — A9567: CPT

## 2019-09-19 PROCEDURE — 85379 FIBRIN DEGRADATION QUANT: CPT

## 2019-09-19 PROCEDURE — 71046 X-RAY EXAM CHEST 2 VIEWS: CPT

## 2019-09-19 PROCEDURE — 80048 BASIC METABOLIC PNL TOTAL CA: CPT

## 2019-09-19 PROCEDURE — 85027 COMPLETE CBC AUTOMATED: CPT

## 2019-09-19 PROCEDURE — 84484 ASSAY OF TROPONIN QUANT: CPT

## 2019-09-19 PROCEDURE — 84100 ASSAY OF PHOSPHORUS: CPT

## 2019-09-19 PROCEDURE — 85610 PROTHROMBIN TIME: CPT

## 2019-09-24 ENCOUNTER — APPOINTMENT (OUTPATIENT)
Dept: INTERNAL MEDICINE | Facility: CLINIC | Age: 79
End: 2019-09-24
Payer: MEDICARE

## 2019-09-24 VITALS
DIASTOLIC BLOOD PRESSURE: 83 MMHG | WEIGHT: 189.7 LBS | HEART RATE: 68 BPM | HEIGHT: 64.76 IN | TEMPERATURE: 98.5 F | SYSTOLIC BLOOD PRESSURE: 138 MMHG | BODY MASS INDEX: 31.99 KG/M2 | OXYGEN SATURATION: 96 %

## 2019-09-24 DIAGNOSIS — R04.2 HEMOPTYSIS: ICD-10-CM

## 2019-09-24 PROCEDURE — 36415 COLL VENOUS BLD VENIPUNCTURE: CPT

## 2019-09-24 PROCEDURE — 81003 URINALYSIS AUTO W/O SCOPE: CPT | Mod: QW

## 2019-09-24 PROCEDURE — 99214 OFFICE O/P EST MOD 30 MIN: CPT | Mod: 25

## 2019-09-25 ENCOUNTER — FORM ENCOUNTER (OUTPATIENT)
Age: 79
End: 2019-09-25

## 2019-09-26 ENCOUNTER — APPOINTMENT (OUTPATIENT)
Dept: CT IMAGING | Facility: IMAGING CENTER | Age: 79
End: 2019-09-26
Payer: MEDICARE

## 2019-09-26 ENCOUNTER — OUTPATIENT (OUTPATIENT)
Dept: OUTPATIENT SERVICES | Facility: HOSPITAL | Age: 79
LOS: 1 days | End: 2019-09-26
Payer: MEDICARE

## 2019-09-26 DIAGNOSIS — I26.99 OTHER PULMONARY EMBOLISM WITHOUT ACUTE COR PULMONALE: ICD-10-CM

## 2019-09-26 DIAGNOSIS — R04.2 HEMOPTYSIS: ICD-10-CM

## 2019-09-26 DIAGNOSIS — C67.9 MALIGNANT NEOPLASM OF BLADDER, UNSPECIFIED: Chronic | ICD-10-CM

## 2019-09-26 LAB
25(OH)D3 SERPL-MCNC: 70.3 NG/ML
ALBUMIN SERPL ELPH-MCNC: 4.8 G/DL
ALP BLD-CCNC: 73 U/L
ALT SERPL-CCNC: 75 U/L
ANION GAP SERPL CALC-SCNC: 14 MMOL/L
APPEARANCE: CLEAR
APPEARANCE: CLEAR
AST SERPL-CCNC: 32 U/L
BACTERIA: NEGATIVE
BASOPHILS # BLD AUTO: 0.02 K/UL
BASOPHILS NFR BLD AUTO: 0.3 %
BILIRUB SERPL-MCNC: 0.4 MG/DL
BILIRUBIN URINE: NEGATIVE
BILIRUBIN URINE: NEGATIVE
BLOOD URINE: NEGATIVE
BLOOD URINE: NEGATIVE
BUN SERPL-MCNC: 30 MG/DL
CALCIUM SERPL-MCNC: 10.1 MG/DL
CHLORIDE SERPL-SCNC: 90 MMOL/L
CHOLEST SERPL-MCNC: 171 MG/DL
CHOLEST/HDLC SERPL: 4.3 RATIO
CO2 SERPL-SCNC: 32 MMOL/L
COLOR: COLORLESS
COLOR: COLORLESS
CREAT SERPL-MCNC: 1.69 MG/DL
EOSINOPHIL # BLD AUTO: 0.21 K/UL
EOSINOPHIL NFR BLD AUTO: 3.1 %
ESTIMATED AVERAGE GLUCOSE: 137 MG/DL
GLUCOSE QUALITATIVE U: NEGATIVE
GLUCOSE QUALITATIVE U: NEGATIVE
GLUCOSE SERPL-MCNC: 115 MG/DL
HBA1C MFR BLD HPLC: 6.4 %
HCT VFR BLD CALC: 38.3 %
HDLC SERPL-MCNC: 40 MG/DL
HGB BLD-MCNC: 11.8 G/DL
HYALINE CASTS: 0 /LPF
IMM GRANULOCYTES NFR BLD AUTO: 0.4 %
KETONES URINE: NEGATIVE
KETONES URINE: NEGATIVE
LDLC SERPL CALC-MCNC: 102 MG/DL
LEUKOCYTE ESTERASE URINE: NEGATIVE
LEUKOCYTE ESTERASE URINE: NEGATIVE
LYMPHOCYTES # BLD AUTO: 1.07 K/UL
LYMPHOCYTES NFR BLD AUTO: 15.6 %
MAN DIFF?: NORMAL
MCHC RBC-ENTMCNC: 27.3 PG
MCHC RBC-ENTMCNC: 30.8 GM/DL
MCV RBC AUTO: 88.7 FL
MICROSCOPIC-UA: NORMAL
MONOCYTES # BLD AUTO: 0.57 K/UL
MONOCYTES NFR BLD AUTO: 8.3 %
NEUTROPHILS # BLD AUTO: 4.96 K/UL
NEUTROPHILS NFR BLD AUTO: 72.3 %
NITRITE URINE: NEGATIVE
NITRITE URINE: NEGATIVE
PH URINE: 7
PH URINE: 7
PLATELET # BLD AUTO: 174 K/UL
POTASSIUM SERPL-SCNC: 4.8 MMOL/L
PROT SERPL-MCNC: 7 G/DL
PROTEIN URINE: NEGATIVE
PROTEIN URINE: NEGATIVE
RBC # BLD: 4.32 M/UL
RBC # FLD: 14 %
RED BLOOD CELLS URINE: 1 /HPF
SODIUM SERPL-SCNC: 135 MMOL/L
SPECIFIC GRAVITY URINE: 1.01
SPECIFIC GRAVITY URINE: 1.01
SQUAMOUS EPITHELIAL CELLS: 0 /HPF
TRIGL SERPL-MCNC: 147 MG/DL
UROBILINOGEN URINE: NORMAL
UROBILINOGEN URINE: NORMAL
WBC # FLD AUTO: 6.86 K/UL
WHITE BLOOD CELLS URINE: 0 /HPF

## 2019-09-26 PROCEDURE — 82565 ASSAY OF CREATININE: CPT

## 2019-09-26 PROCEDURE — 71275 CT ANGIOGRAPHY CHEST: CPT

## 2019-09-26 PROCEDURE — 71275 CT ANGIOGRAPHY CHEST: CPT | Mod: 26

## 2019-09-30 ENCOUNTER — APPOINTMENT (OUTPATIENT)
Dept: INTERNAL MEDICINE | Facility: CLINIC | Age: 79
End: 2019-09-30

## 2019-10-01 NOTE — ASSESSMENT
[FreeTextEntry1] : 1) Pulmonary embolism/hemoptysis: Given recent hemoptysis, will repeat CT scan of the chest as per patient request, will need to monitor renal function. Vital signs stable.\par 2) HTN: stable, continue current management\par 3) Chronic afib: rate controlled, continue with lovenox\par 4) Edema of both feet: will repeat liver function and creatinine levels. Advised to f/u with cardiologist. lasix given, will need to check kidney function\par 5) HLD: check lipid panel, patient has been off lipitor, worried about liver function\par 6) CRF: check kidney function and electrolytes, recent U/S did not show any kidney stones\par 7) Urinary bladder cancer: patient to f/u with urologist for biopsy.

## 2019-10-01 NOTE — HISTORY OF PRESENT ILLNESS
[FreeTextEntry1] : Patient presents for f/u of chronic disease management [de-identified] : Patient states he had an episode of hemoptysis. Denies any shortness of breath. Also has lower extremity swelling. Denies any chest pain or chest tightness. Has shortness of breath. Has been taking lovenox as prescribed. Denies any orthopnea or paroxysmal nocturnal dyspnea. Patient has not taken lipitor as he thinks he has bruising from his liver.

## 2019-10-01 NOTE — PHYSICAL EXAM
[Normal] : affect was normal and insight and judgment were intact [de-identified] : multiple ecchymosis present. 2+ pitting edema bilaterally [de-identified] : multiple ecchymosis on skin

## 2019-10-01 NOTE — ADDENDUM
[FreeTextEntry1] : Due to recent episode of hemoptysis, would recommend patient postponing biopsy until October 31st until he completes at least 3 months of anticoagulant therapy for cancer provoked DVT.

## 2019-10-01 NOTE — REVIEW OF SYSTEMS
[Negative] : Heme/Lymph [FreeTextEntry6] : hemoptysis [de-identified] : lower extremity swelling,

## 2019-10-11 ENCOUNTER — RX RENEWAL (OUTPATIENT)
Age: 79
End: 2019-10-11

## 2019-10-17 ENCOUNTER — RX RENEWAL (OUTPATIENT)
Age: 79
End: 2019-10-17

## 2019-10-29 ENCOUNTER — RX RENEWAL (OUTPATIENT)
Age: 79
End: 2019-10-29

## 2019-10-30 ENCOUNTER — RX RENEWAL (OUTPATIENT)
Age: 79
End: 2019-10-30

## 2019-11-15 ENCOUNTER — APPOINTMENT (OUTPATIENT)
Dept: INTERNAL MEDICINE | Facility: CLINIC | Age: 79
End: 2019-11-15
Payer: MEDICARE

## 2019-11-15 ENCOUNTER — LABORATORY RESULT (OUTPATIENT)
Age: 79
End: 2019-11-15

## 2019-11-15 VITALS
HEART RATE: 70 BPM | HEIGHT: 64.57 IN | RESPIRATION RATE: 14 BRPM | DIASTOLIC BLOOD PRESSURE: 70 MMHG | OXYGEN SATURATION: 96 % | BODY MASS INDEX: 30.86 KG/M2 | WEIGHT: 182.98 LBS | TEMPERATURE: 97.6 F | SYSTOLIC BLOOD PRESSURE: 110 MMHG

## 2019-11-15 DIAGNOSIS — E55.9 VITAMIN D DEFICIENCY, UNSPECIFIED: ICD-10-CM

## 2019-11-15 DIAGNOSIS — N40.0 BENIGN PROSTATIC HYPERPLASIA WITHOUT LOWER URINARY TRACT SYMPMS: ICD-10-CM

## 2019-11-15 PROCEDURE — 99214 OFFICE O/P EST MOD 30 MIN: CPT | Mod: 25

## 2019-11-15 PROCEDURE — 36415 COLL VENOUS BLD VENIPUNCTURE: CPT

## 2019-11-15 PROCEDURE — G0008: CPT

## 2019-11-15 PROCEDURE — 90662 IIV NO PRSV INCREASED AG IM: CPT

## 2019-11-15 RX ORDER — ENOXAPARIN SODIUM 100 MG/ML
80 INJECTION SUBCUTANEOUS
Qty: 6 | Refills: 3 | Status: DISCONTINUED | COMMUNITY
Start: 2019-08-09 | End: 2019-11-15

## 2019-11-18 NOTE — REASON FOR VISIT
[FreeTextEntry1] : Annual wellness visit and chronic disease management [Annual Wellness Visit] : an annual wellness visit

## 2019-11-18 NOTE — HISTORY OF PRESENT ILLNESS
[FreeTextEntry1] : Patient presents for Medicare annual wellness visit and chronic disease management [de-identified] : patient overall feels well denies any chest pain chest tightness shortness of breath palpitation. Patient denies any other episode of hemoptysis. Patient will be following up for biopsy of the prostate has to take several toe has completed 3 months of anticoagulation. Patient denies any falls at home or any depression.

## 2019-11-18 NOTE — HEALTH RISK ASSESSMENT
[Good] : ~his/her~  mood as  good [FreeTextEntry1] : afib, overall health maintenance [Intercurrent hospitalizations] : was admitted to the hospital  [No falls in past year] : Patient reported no falls in the past year [] : No [0] : 2) Feeling down, depressed, or hopeless: Not at all (0) [de-identified] : Urologist [de-identified] : pulmonary embolus [Change in mental status noted] : No change in mental status noted [Language] : denies difficulty with language [Learning/Retaining New Information] : denies difficulty learning/retaining new information [Behavior] : denies difficulty with behavior [Handling Complex Tasks] : denies difficulty handling complex tasks [Reasoning] : denies difficulty with reasoning [Spatial Ability and Orientation] : denies difficulty with spatial ability and orientation [With Significant Other] : lives with significant other [None] : None [] :  [Feels Safe at Home] : Feels safe at home [Fully functional (bathing, dressing, toileting, transferring, walking, feeding)] : Fully functional (bathing, dressing, toileting, transferring, walking, feeding) [Fully functional (using the telephone, shopping, preparing meals, housekeeping, doing laundry, using] : Fully functional and needs no help or supervision to perform IADLs (using the telephone, shopping, preparing meals, housekeeping, doing laundry, using transportation, managing medications and managing finances) [Reports changes in hearing] : Reports no changes in hearing [Reports changes in vision] : Reports no changes in vision [Reports normal functional visual acuity (ie: able to read med bottle)] : Reports normal functional visual acuity [Reports changes in dental health] : Reports no changes in dental health [Carbon Monoxide Detector] : carbon monoxide detector [Smoke Detector] : smoke detector [Safety elements used in home] : safety elements used in home [Guns at Home] : no guns at home [Seat Belt] :  uses seat belt [Sunscreen] : uses sunscreen [Travel to Developing Areas] : does not  travel to developing areas [TB Exposure] : is not being exposed to tuberculosis [Caregiver Concerns] : does not have caregiver concerns [MammogramComments] : n/a [PapSmearComments] : n/a [BoneDensityComments] : n/a [ColonoscopyComments] : n/a [Reviewed no changes] : Reviewed no changes [AdvancecareDate] : 11/2019

## 2019-11-18 NOTE — ASSESSMENT
[FreeTextEntry1] : 1) Chronic afib: Patient found to be in afib during his last hospital visit, has completed 3 months of Xarelto, blood work and kidney function reviewed, will start eliquis 2.5 mg po bid\par 2) Pulmonary embolus: Patient has completed 3 months of Xarelto, secondary to provoked DVT, has received treatment for bladder cancer, switch to eliquis, had CT scan which revealed resolution of embolus\par 3) DM: check HgA1c, advised low carbohydrate diet\par 4) Enlarged prostate: Patient will be following up for prostate biopsy\par 5) Elevated liver enzymes: repeat hepatic function panel, advised to start taking statin again, trending down and less than 3 times upper limit\par 6) HLD: advised to start taking lipid panel, check liver enzymes\par 7) Urinary bladder cancer: Currently receiving treatment, follows with urologist\par 8) Low Vitamin D: check levels\par 9) HTN: stable, continue current management\par Influenza administered today

## 2019-11-18 NOTE — PHYSICAL EXAM
[Normal] : affect was normal and insight and judgment were intact [de-identified] : irregaularly irregular rhythm

## 2019-11-19 LAB
25(OH)D3 SERPL-MCNC: 92.6 NG/ML
ALBUMIN SERPL ELPH-MCNC: 4.5 G/DL
ALP BLD-CCNC: 49 U/L
ALT SERPL-CCNC: 28 U/L
ANION GAP SERPL CALC-SCNC: 15 MMOL/L
APPEARANCE: CLEAR
AST SERPL-CCNC: 16 U/L
BACTERIA: NEGATIVE
BASOPHILS # BLD AUTO: 0.04 K/UL
BASOPHILS NFR BLD AUTO: 0.6 %
BILIRUB SERPL-MCNC: 0.4 MG/DL
BILIRUBIN URINE: NEGATIVE
BLOOD URINE: NEGATIVE
BUN SERPL-MCNC: 85 MG/DL
CALCIUM SERPL-MCNC: 9.9 MG/DL
CHLORIDE SERPL-SCNC: 100 MMOL/L
CHOLEST SERPL-MCNC: 125 MG/DL
CHOLEST/HDLC SERPL: 3.1 RATIO
CO2 SERPL-SCNC: 28 MMOL/L
COLOR: COLORLESS
CREAT SERPL-MCNC: 2.17 MG/DL
EOSINOPHIL # BLD AUTO: 0.2 K/UL
EOSINOPHIL NFR BLD AUTO: 3.2 %
ESTIMATED AVERAGE GLUCOSE: 143 MG/DL
GLUCOSE QUALITATIVE U: NEGATIVE
GLUCOSE SERPL-MCNC: 127 MG/DL
HBA1C MFR BLD HPLC: 6.6 %
HCT VFR BLD CALC: 39.9 %
HDLC SERPL-MCNC: 41 MG/DL
HGB BLD-MCNC: 12.1 G/DL
HYALINE CASTS: 0 /LPF
IMM GRANULOCYTES NFR BLD AUTO: 0.3 %
KETONES URINE: NEGATIVE
LDLC SERPL CALC-MCNC: 60 MG/DL
LEUKOCYTE ESTERASE URINE: NEGATIVE
LYMPHOCYTES # BLD AUTO: 1.46 K/UL
LYMPHOCYTES NFR BLD AUTO: 23.3 %
MAN DIFF?: NORMAL
MCHC RBC-ENTMCNC: 27.4 PG
MCHC RBC-ENTMCNC: 30.3 GM/DL
MCV RBC AUTO: 90.5 FL
MICROSCOPIC-UA: NORMAL
MONOCYTES # BLD AUTO: 0.4 K/UL
MONOCYTES NFR BLD AUTO: 6.4 %
NEUTROPHILS # BLD AUTO: 4.14 K/UL
NEUTROPHILS NFR BLD AUTO: 66.2 %
NITRITE URINE: NEGATIVE
PH URINE: 6
PLATELET # BLD AUTO: 184 K/UL
POTASSIUM SERPL-SCNC: 4.6 MMOL/L
PROT SERPL-MCNC: 6.7 G/DL
PROTEIN URINE: NEGATIVE
RBC # BLD: 4.41 M/UL
RBC # FLD: 15.4 %
RED BLOOD CELLS URINE: 0 /HPF
SODIUM SERPL-SCNC: 143 MMOL/L
SPECIFIC GRAVITY URINE: 1.01
SQUAMOUS EPITHELIAL CELLS: 0 /HPF
TRIGL SERPL-MCNC: 119 MG/DL
UROBILINOGEN URINE: NORMAL
WBC # FLD AUTO: 6.26 K/UL
WHITE BLOOD CELLS URINE: 0 /HPF

## 2019-11-26 ENCOUNTER — APPOINTMENT (OUTPATIENT)
Dept: INTERNAL MEDICINE | Facility: CLINIC | Age: 79
End: 2019-11-26
Payer: MEDICARE

## 2019-11-26 ENCOUNTER — OTHER (OUTPATIENT)
Age: 79
End: 2019-11-26

## 2019-11-26 VITALS
BODY MASS INDEX: 30.86 KG/M2 | DIASTOLIC BLOOD PRESSURE: 80 MMHG | OXYGEN SATURATION: 98 % | SYSTOLIC BLOOD PRESSURE: 120 MMHG | TEMPERATURE: 98 F | RESPIRATION RATE: 14 BRPM | HEIGHT: 64.57 IN | HEART RATE: 66 BPM | WEIGHT: 182.98 LBS

## 2019-11-26 PROCEDURE — 36415 COLL VENOUS BLD VENIPUNCTURE: CPT

## 2019-11-29 LAB
ANION GAP SERPL CALC-SCNC: 13 MMOL/L
BUN SERPL-MCNC: 59 MG/DL
CALCIUM SERPL-MCNC: 9.2 MG/DL
CHLORIDE SERPL-SCNC: 98 MMOL/L
CO2 SERPL-SCNC: 31 MMOL/L
CREAT SERPL-MCNC: 2.2 MG/DL
GLUCOSE SERPL-MCNC: 134 MG/DL
POTASSIUM SERPL-SCNC: 4.5 MMOL/L
SODIUM SERPL-SCNC: 142 MMOL/L

## 2019-12-02 ENCOUNTER — APPOINTMENT (OUTPATIENT)
Dept: INTERNAL MEDICINE | Facility: CLINIC | Age: 79
End: 2019-12-02
Payer: MEDICARE

## 2019-12-02 PROCEDURE — 36415 COLL VENOUS BLD VENIPUNCTURE: CPT

## 2019-12-03 ENCOUNTER — MESSAGE (OUTPATIENT)
Age: 79
End: 2019-12-03

## 2019-12-03 LAB
ANION GAP SERPL CALC-SCNC: 13 MMOL/L
BUN SERPL-MCNC: 47 MG/DL
CALCIUM SERPL-MCNC: 9.7 MG/DL
CHLORIDE SERPL-SCNC: 99 MMOL/L
CO2 SERPL-SCNC: 29 MMOL/L
CREAT SERPL-MCNC: 2.03 MG/DL
GLUCOSE SERPL-MCNC: 126 MG/DL
POTASSIUM SERPL-SCNC: 4.8 MMOL/L
SODIUM SERPL-SCNC: 141 MMOL/L

## 2020-01-21 ENCOUNTER — APPOINTMENT (OUTPATIENT)
Dept: UROLOGY | Facility: CLINIC | Age: 80
End: 2020-01-21
Payer: MEDICARE

## 2020-01-21 VITALS
SYSTOLIC BLOOD PRESSURE: 131 MMHG | HEART RATE: 75 BPM | WEIGHT: 182 LBS | BODY MASS INDEX: 30.69 KG/M2 | DIASTOLIC BLOOD PRESSURE: 80 MMHG | TEMPERATURE: 97.9 F | HEIGHT: 64.57 IN

## 2020-01-21 DIAGNOSIS — R97.20 ELEVATED PROSTATE, SPECIFIC ANTIGEN [PSA]: ICD-10-CM

## 2020-01-21 PROCEDURE — 99203 OFFICE O/P NEW LOW 30 MIN: CPT

## 2020-01-22 PROBLEM — R97.20 PSA ELEVATION: Status: ACTIVE | Noted: 2020-01-22

## 2020-01-22 LAB
PSA FREE FLD-MCNC: 20 %
PSA FREE SERPL-MCNC: 0.03 NG/ML
PSA SERPL-MCNC: 0.14 NG/ML

## 2020-01-22 NOTE — ASSESSMENT
[FreeTextEntry1] : I discussed with the patient that an elevated PSA could signify a host of different processes including infection, trauma, BPH, and cancer.  Given his recent course of BCG, the repeated instrumentation of his urethra may have led to an incidentally elevated PSA.  We discussed that further evaluation would first consist of a repeat PSA to ensure the level is still elevated and to obtain a free/bound ratio.  Further work-up could include MRI, and if the MRI is concerning a likely prostate biopsy.  \par \par The PSA was repeated today and found to be 0.14 ng/mL.  This is consistent with a prior known PSA level from 2016 which was 0.2 at that time.  If his PSA had been detected to be elevated in the interim near the time point when his BCG have been administered, I would suspect that the elevation was related to this instrumentation as noted above.  I would not recommend that he need either imaging of the prostate or prostate biopsy at this time based on these findings.  I would recommend that he undergo surveillance cystoscopy and I will make arrangements for this at the next available time.\par

## 2020-01-22 NOTE — HISTORY OF PRESENT ILLNESS
[FreeTextEntry1] : Tim Geiger presents for evaluation of "prostate issues." He was originally planning on getting a biopsy with his previous urologist, however, he experienced a PE in August of last year requiring anticoagulation, which prevented his biopsy.  He never received a call back from his urologist to reschedule  and decided to switch providers for this reason.  He also has a history of non-muscle invasive bladder cancer, for which he received TURBT and finished induction BCG in the fall of 2019.  He has not yet had follow-up surveillance cystoscopy.  Patient is currently experiencing no nausea and no anorexia no urinary retention, no urinary urgency, no urinary frequency, no nocturia, no straining, no weak stream, no dysuria, no gross hematuria, no bladder spasm, no abdominal pain, no flank pain, no fever and no fatigue.\par \par The patient is a former smoker and says that he quit about 30 to 40 years ago.\par

## 2020-01-22 NOTE — PHYSICAL EXAM
[General Appearance - Well Developed] : well developed [General Appearance - Well Nourished] : well nourished [Well Groomed] : well groomed [Normal Appearance] : normal appearance [Respiration, Rhythm And Depth] : normal respiratory rhythm and effort [Edema] : no peripheral edema [General Appearance - In No Acute Distress] : no acute distress [Abdomen Soft] : soft [Abdomen Tenderness] : non-tender [Exaggerated Use Of Accessory Muscles For Inspiration] : no accessory muscle use [Urethral Meatus] : meatus normal [Costovertebral Angle Tenderness] : no ~M costovertebral angle tenderness [Urinary Bladder Findings] : the bladder was normal on palpation [Scrotum] : the scrotum was normal [Testes Mass (___cm)] : there were no testicular masses [Normal Station and Gait] : the gait and station were normal for the patient's age [] : no rash [Affect] : the affect was normal [No Focal Deficits] : no focal deficits [Oriented To Time, Place, And Person] : oriented to person, place, and time [Mood] : the mood was normal [Not Anxious] : not anxious [No Palpable Adenopathy] : no palpable adenopathy

## 2020-02-06 ENCOUNTER — OUTPATIENT (OUTPATIENT)
Dept: OUTPATIENT SERVICES | Facility: HOSPITAL | Age: 80
LOS: 1 days | End: 2020-02-06
Payer: MEDICARE

## 2020-02-06 ENCOUNTER — APPOINTMENT (OUTPATIENT)
Dept: UROLOGY | Facility: CLINIC | Age: 80
End: 2020-02-06
Payer: MEDICARE

## 2020-02-06 VITALS — SYSTOLIC BLOOD PRESSURE: 136 MMHG | DIASTOLIC BLOOD PRESSURE: 81 MMHG | HEART RATE: 74 BPM | TEMPERATURE: 97.7 F

## 2020-02-06 DIAGNOSIS — C67.9 MALIGNANT NEOPLASM OF BLADDER, UNSPECIFIED: ICD-10-CM

## 2020-02-06 DIAGNOSIS — C67.9 MALIGNANT NEOPLASM OF BLADDER, UNSPECIFIED: Chronic | ICD-10-CM

## 2020-02-06 DIAGNOSIS — R35.0 FREQUENCY OF MICTURITION: ICD-10-CM

## 2020-02-06 PROCEDURE — 52000 CYSTOURETHROSCOPY: CPT

## 2020-02-08 LAB — URINE CYTOLOGY: NORMAL

## 2020-02-20 DIAGNOSIS — C67.9 MALIGNANT NEOPLASM OF BLADDER, UNSPECIFIED: ICD-10-CM

## 2020-02-25 ENCOUNTER — EMERGENCY (EMERGENCY)
Facility: HOSPITAL | Age: 80
LOS: 1 days | Discharge: HOME | End: 2020-02-25
Attending: STUDENT IN AN ORGANIZED HEALTH CARE EDUCATION/TRAINING PROGRAM
Payer: MEDICARE

## 2020-02-25 VITALS
WEIGHT: 184.97 LBS | RESPIRATION RATE: 19 BRPM | DIASTOLIC BLOOD PRESSURE: 86 MMHG | TEMPERATURE: 98 F | HEART RATE: 84 BPM | SYSTOLIC BLOOD PRESSURE: 149 MMHG | OXYGEN SATURATION: 97 % | HEIGHT: 66 IN

## 2020-02-25 DIAGNOSIS — C67.9 MALIGNANT NEOPLASM OF BLADDER, UNSPECIFIED: Chronic | ICD-10-CM

## 2020-02-25 PROCEDURE — 70450 CT HEAD/BRAIN W/O DYE: CPT | Mod: 26

## 2020-02-25 PROCEDURE — 12002 RPR S/N/AX/GEN/TRNK2.6-7.5CM: CPT

## 2020-02-25 PROCEDURE — 99284 EMERGENCY DEPT VISIT MOD MDM: CPT | Mod: 25

## 2020-02-25 RX ORDER — TETANUS TOXOID, REDUCED DIPHTHERIA TOXOID AND ACELLULAR PERTUSSIS VACCINE, ADSORBED 5; 2.5; 8; 8; 2.5 [IU]/.5ML; [IU]/.5ML; UG/.5ML; UG/.5ML; UG/.5ML
0.5 SUSPENSION INTRAMUSCULAR ONCE
Refills: 0 | Status: COMPLETED | OUTPATIENT
Start: 2020-02-25 | End: 2020-02-25

## 2020-02-25 RX ORDER — ACETAMINOPHEN 500 MG
975 TABLET ORAL ONCE
Refills: 0 | Status: DISCONTINUED | OUTPATIENT
Start: 2020-02-25 | End: 2020-03-03

## 2020-02-25 NOTE — ED ADULT NURSE NOTE - PMH
Bladder cancer    BPH (Benign Prostatic Hypertrophy)    Cerebrovascular accident (CVA)    Diabetes mellitus    Gouty arthritis    Hyperlipidemia    Hypertension

## 2020-02-25 NOTE — ED PROVIDER NOTE - NSFOLLOWUPINSTRUCTIONS_ED_ALL_ED_FT
1- Return to ER in 7 days for staple removal  2- Any sign of infection like redness to cut, pus, fevers, increasing pain come back to the ER  3- If you are having headaches, vomiting, numbness, weakness, visual changes come back to the ER  4- Motrin / Tylenol as needed for pain

## 2020-02-25 NOTE — ED PROVIDER NOTE - PATIENT PORTAL LINK FT
You can access the FollowMyHealth Patient Portal offered by [facility] by registering at the following website: [email or web address]. By joining [employer]’s FollowMyHealth portal, you will also be able to view your health information using other applications (apps) compatible with our system.

## 2020-02-25 NOTE — ED ADULT NURSE NOTE - OBJECTIVE STATEMENT
Pt is a 79y Male c/o head laceration s/p fall off exercise bike. Pt PMHx CVA, diabetes, HTN, bladder CA. Pt drove himself here. Pt states that he is on 81mg of aspirin daily. Pt has a laceration on his scalp 3.5cm in length and 1 cm in width. Pt denies dizziness, denies LOC. Pt was found in the bathroom trying to clean himself up by his wife. Pt is a 79y Male c/o head laceration s/p mechanical fall off exercise bike. Pt PMHx CVA, diabetes, HTN, bladder CA. Pt drove himself here. Pt states that he is on 81mg of aspirin daily. Pt has a laceration on his scalp 3.5cm in length and 1 cm in width. Pt denies dizziness, denies LOC. Pt was found in the bathroom trying to clean himself up by his wife. Pt attempted to go to urgent care but found urgent care to be closed. Pt came here as he was still bleeding. Pt actively bleeding from scalp. Pt denies any pain. Pt informed of POC by MD Cuenca and states he is comfortable.

## 2020-02-25 NOTE — ED PROCEDURE NOTE - ATTENDING CONTRIBUTION TO CARE
Attending MD Cuenca: I was present during the key portions of the procedure and immediately available all other times.

## 2020-02-25 NOTE — ED PROVIDER NOTE - OBJECTIVE STATEMENT
79 y.o. male on ASA coming in with a head laceration after a mechanical trip and fall from a standing position.  Hit the back of his head no LOC.  Initially was not going to come in but bleeding had persisted.  NO headaches, no visual changes, no numbness no weakness.  NO nausea no vomiting.  Nothing makes his bleeding better or worse.

## 2020-02-25 NOTE — ED PROVIDER NOTE - NSFOLLOWUPCLINICS_GEN_ALL_ED_FT
[facility] General Internal Medicine  General Internal Medicine  [address] [id number]  Phone: [phone]  Fax:   Follow Up Time:

## 2020-02-25 NOTE — ED PROVIDER NOTE - ATTENDING CONTRIBUTION TO CARE
Attending MD Cuenca:   I personally have seen and examined this patient.  ACP, Resident, medical student note reviewed and agree on plan of care and except where noted.     79 y.o. male on ASA coming in with a head laceration after a mechanical trip and fall from a standing position.  Hit the back of his head no LOC.  Initially was not going to come in but bleeding had persisted.  NO headaches, no visual changes, no numbness no weakness.  NO nausea no vomiting.  Nothing makes his bleeding better or worse.    79y M presents with bleeding scalp laceration s/p fall from stationary bike without loss of consciousness. On ASA only. Last tetanus unknown.    On exam patient is well appearing, vitals wnl, rrr s1s2, lungs clear, abdomen soft, no neuro deficits, palpable peripheral pulses, posterior scalp with linear laceration.    Will obtain CTH to screen for ICH, repair lac, update tetanus, likely dc home.

## 2020-02-26 VITALS
RESPIRATION RATE: 18 BRPM | TEMPERATURE: 98 F | OXYGEN SATURATION: 96 % | SYSTOLIC BLOOD PRESSURE: 138 MMHG | HEART RATE: 72 BPM | DIASTOLIC BLOOD PRESSURE: 82 MMHG

## 2020-02-26 DIAGNOSIS — N10 ACUTE TUBULO-INTERSTITIAL NEPHRITIS: ICD-10-CM

## 2020-02-26 DIAGNOSIS — Z87.440 PERSONAL HISTORY OF URINARY (TRACT) INFECTIONS: ICD-10-CM

## 2020-02-26 PROCEDURE — 90471 IMMUNIZATION ADMIN: CPT

## 2020-02-26 PROCEDURE — 90715 TDAP VACCINE 7 YRS/> IM: CPT

## 2020-02-26 PROCEDURE — 70450 CT HEAD/BRAIN W/O DYE: CPT

## 2020-02-26 PROCEDURE — 12002 RPR S/N/AX/GEN/TRNK2.6-7.5CM: CPT

## 2020-02-26 PROCEDURE — 99284 EMERGENCY DEPT VISIT MOD MDM: CPT | Mod: 25

## 2020-02-26 RX ADMIN — TETANUS TOXOID, REDUCED DIPHTHERIA TOXOID AND ACELLULAR PERTUSSIS VACCINE, ADSORBED 0.5 MILLILITER(S): 5; 2.5; 8; 8; 2.5 SUSPENSION INTRAMUSCULAR at 00:01

## 2020-02-26 NOTE — ED ADULT NURSE REASSESSMENT NOTE - NS ED NURSE REASSESS COMMENT FT1
Pt resting in bed comfortably denies pain. Pt has his laceration stapled and is not bleeding from the site. Pt is awaiting CT results.

## 2020-03-06 ENCOUNTER — EMERGENCY (EMERGENCY)
Facility: HOSPITAL | Age: 80
LOS: 1 days | Discharge: HOME | End: 2020-03-06
Attending: EMERGENCY MEDICINE
Payer: MEDICARE

## 2020-03-06 VITALS
OXYGEN SATURATION: 96 % | TEMPERATURE: 98 F | WEIGHT: 184.97 LBS | SYSTOLIC BLOOD PRESSURE: 134 MMHG | RESPIRATION RATE: 18 BRPM | HEIGHT: 66 IN | HEART RATE: 67 BPM | DIASTOLIC BLOOD PRESSURE: 79 MMHG

## 2020-03-06 DIAGNOSIS — C67.9 MALIGNANT NEOPLASM OF BLADDER, UNSPECIFIED: Chronic | ICD-10-CM

## 2020-03-06 PROCEDURE — L9995: CPT

## 2020-03-06 PROCEDURE — G0463: CPT

## 2020-03-06 NOTE — ED PROVIDER NOTE - CLINICAL SUMMARY MEDICAL DECISION MAKING FREE TEXT BOX
78yo male presenting for staple removal placed 2 weeks ago for head lac after mechanical fall. No other complaints. 78yo male presenting for staple removal placed 2 weeks ago for head lac after mechanical fall. No other complaints.    Attending MD Issa: patient here for scalp staple removal.  Placed on 2/25.  Patient denies an pain at the site. Denies headaches, nausea, vomiting, vision changes, difficulty concentrating or neck pain.  Site looks well healed.  Plan: staple removal.

## 2020-03-06 NOTE — ED PROVIDER NOTE - NS ED ROS FT
ROS:  GENERAL: No fever, no chills  SKIN: no rashes  NEURO: no headache, no weakness  MSK: No joint pain

## 2020-03-06 NOTE — ED PROVIDER NOTE - OBJECTIVE STATEMENT
78yo male HTN on aspirin p/w staple removal. 7 staples placed 2/25/20 after mechanical fall c/b head laceration. Patient denies fevers, discharge, pain, further falls. Drove here today. No other complaints.

## 2020-03-06 NOTE — ED PROVIDER NOTE - PATIENT PORTAL LINK FT
You can access the FollowMyHealth Patient Portal offered by [facility] by registering at the following website: [email or web address]. By joining [employer]’s FollowMyHealth portal, you will also be able to view your health information using other applications (apps) compatible with our system.

## 2020-03-06 NOTE — ED PROVIDER NOTE - ATTENDING CONTRIBUTION TO CARE
Attending MD Issa:  I personally have seen and examined this patient.  Resident note reviewed and agree on plan of care and except where noted.

## 2020-03-06 NOTE — ED PROVIDER NOTE - PHYSICAL EXAMINATION
Physical Exam:  Gen: NAD, AOx3, non-toxic appearing, able to ambulate with cane  Head: well healed 6 cm scalp laceration with 7 staples in place, no discharge, erythema or TTP  MSK: no visible deformities, ROM normal in UE/LE, no back TTP  Skin: Warm, well perfused, no rash  Psych: normal affect, calm

## 2020-03-06 NOTE — ED PROVIDER NOTE - NSFOLLOWUPINSTRUCTIONS_ED_ALL_ED_FT
- Continue all regular medications  - For pain, take tylenol as directed on the packaging  - Follow up with your primary doctor within 3 days  - Return to the ER for fever, severe headache, pus coming from wound or any worsening symptoms or concerns

## 2020-03-06 NOTE — ED PROCEDURE NOTE - ATTENDING CONTRIBUTION TO CARE
Attending MD Issa: I was present during the key portions of the procedure and immediately available at all other times.

## 2020-03-31 ENCOUNTER — RX RENEWAL (OUTPATIENT)
Age: 80
End: 2020-03-31

## 2020-04-11 ENCOUNTER — RX RENEWAL (OUTPATIENT)
Age: 80
End: 2020-04-11

## 2020-04-13 ENCOUNTER — RX RENEWAL (OUTPATIENT)
Age: 80
End: 2020-04-13

## 2020-04-21 ENCOUNTER — RX RENEWAL (OUTPATIENT)
Age: 80
End: 2020-04-21

## 2020-05-05 ENCOUNTER — APPOINTMENT (OUTPATIENT)
Dept: UROLOGY | Facility: CLINIC | Age: 80
End: 2020-05-05

## 2020-05-07 ENCOUNTER — APPOINTMENT (OUTPATIENT)
Dept: INTERNAL MEDICINE | Facility: CLINIC | Age: 80
End: 2020-05-07
Payer: MEDICARE

## 2020-05-07 VITALS
TEMPERATURE: 98.7 F | DIASTOLIC BLOOD PRESSURE: 73 MMHG | SYSTOLIC BLOOD PRESSURE: 128 MMHG | OXYGEN SATURATION: 97 % | HEART RATE: 60 BPM

## 2020-05-07 DIAGNOSIS — L03.90 CELLULITIS, UNSPECIFIED: ICD-10-CM

## 2020-05-07 PROCEDURE — 99214 OFFICE O/P EST MOD 30 MIN: CPT | Mod: 25

## 2020-05-07 PROCEDURE — 36415 COLL VENOUS BLD VENIPUNCTURE: CPT

## 2020-05-09 PROBLEM — L03.90 CELLULITIS OF SKIN: Status: ACTIVE | Noted: 2020-04-20

## 2020-05-09 NOTE — HISTORY OF PRESENT ILLNESS
[de-identified] : Patient has had wound left lower extremity x 2 weeks, has done two rounds of antibiotics. Denies any purulent drainage, Denies any weakness or numbness. Has not been resolving. Has seen cardiologist for left lower extremity edema, had unremarkable workup. Has not seen vascular surgeon.  [FreeTextEntry1] : Patient presents for f/u of chronic disease management and left lower extremity wound

## 2020-05-09 NOTE — PHYSICAL EXAM
[Normal] : affect was normal and insight and judgment were intact [de-identified] : left lower extremity ulcer above the medial malleolus, granulation tissue present

## 2020-05-11 LAB
25(OH)D3 SERPL-MCNC: 89.6 NG/ML
ALBUMIN SERPL ELPH-MCNC: 4.4 G/DL
ALP BLD-CCNC: 55 U/L
ALT SERPL-CCNC: 31 U/L
ANION GAP SERPL CALC-SCNC: 16 MMOL/L
AST SERPL-CCNC: 24 U/L
BASOPHILS # BLD AUTO: 0.04 K/UL
BASOPHILS NFR BLD AUTO: 0.6 %
BILIRUB SERPL-MCNC: 0.4 MG/DL
BUN SERPL-MCNC: 63 MG/DL
CALCIUM SERPL-MCNC: 9.2 MG/DL
CHLORIDE SERPL-SCNC: 93 MMOL/L
CHOLEST SERPL-MCNC: 155 MG/DL
CHOLEST/HDLC SERPL: 4.3 RATIO
CO2 SERPL-SCNC: 27 MMOL/L
CREAT SERPL-MCNC: 1.93 MG/DL
EOSINOPHIL # BLD AUTO: 0.16 K/UL
EOSINOPHIL NFR BLD AUTO: 2.6 %
ESTIMATED AVERAGE GLUCOSE: 148 MG/DL
GLUCOSE SERPL-MCNC: 109 MG/DL
HBA1C MFR BLD HPLC: 6.8 %
HCT VFR BLD CALC: 38.6 %
HDLC SERPL-MCNC: 36 MG/DL
HGB BLD-MCNC: 11.9 G/DL
IMM GRANULOCYTES NFR BLD AUTO: 0.5 %
LDLC SERPL CALC-MCNC: 88 MG/DL
LYMPHOCYTES # BLD AUTO: 1.26 K/UL
LYMPHOCYTES NFR BLD AUTO: 20.2 %
MAN DIFF?: NORMAL
MCHC RBC-ENTMCNC: 26.7 PG
MCHC RBC-ENTMCNC: 30.8 GM/DL
MCV RBC AUTO: 86.7 FL
MONOCYTES # BLD AUTO: 0.51 K/UL
MONOCYTES NFR BLD AUTO: 8.2 %
NEUTROPHILS # BLD AUTO: 4.24 K/UL
NEUTROPHILS NFR BLD AUTO: 67.9 %
PLATELET # BLD AUTO: 168 K/UL
POTASSIUM SERPL-SCNC: 4.5 MMOL/L
PROT SERPL-MCNC: 6.7 G/DL
RBC # BLD: 4.45 M/UL
RBC # FLD: 13.9 %
SODIUM SERPL-SCNC: 135 MMOL/L
TRIGL SERPL-MCNC: 153 MG/DL
URATE SERPL-MCNC: 3.9 MG/DL
VIT B12 SERPL-MCNC: 358 PG/ML
WBC # FLD AUTO: 6.24 K/UL

## 2020-05-14 ENCOUNTER — APPOINTMENT (OUTPATIENT)
Dept: INTERNAL MEDICINE | Facility: CLINIC | Age: 80
End: 2020-05-14
Payer: MEDICARE

## 2020-05-14 PROCEDURE — 99213 OFFICE O/P EST LOW 20 MIN: CPT

## 2020-05-15 VITALS
SYSTOLIC BLOOD PRESSURE: 134 MMHG | TEMPERATURE: 98.8 F | RESPIRATION RATE: 14 BRPM | HEIGHT: 64 IN | DIASTOLIC BLOOD PRESSURE: 62 MMHG | HEART RATE: 64 BPM | BODY MASS INDEX: 31.24 KG/M2 | OXYGEN SATURATION: 97 % | WEIGHT: 183 LBS

## 2020-05-15 NOTE — PHYSICAL EXAM
[Normal] : normal gait, coordination grossly intact, no focal deficits and deep tendon reflexes were 2+ and symmetric [de-identified] : ulcer above the left medial malleolus 5.5 x 5.5 cm with granulation tissue

## 2020-05-15 NOTE — ASSESSMENT
[FreeTextEntry1] : #1 left leg ulcer: Improving continue current treatment, advised to apply DuoDERM dressings patient does have venous stasis changes had normal cardiac workup approximately one and a half months ago, was advised to follow up with vascular surgeon. Edema also appropriate atropine from chronic kidney disease patient taking furosemide also advised to take metolazone 2.5 mg twice, return to office on Monday to check electrolytes

## 2020-05-15 NOTE — HISTORY OF PRESENT ILLNESS
[FreeTextEntry8] : Patient presents for followup of left foot ulcer in the left states pain has improved,has been changing dressing twice a day. Denies any numbness. Denies any fevers or chills. Denies any purulent drainage.

## 2020-05-18 ENCOUNTER — APPOINTMENT (OUTPATIENT)
Dept: INTERNAL MEDICINE | Facility: CLINIC | Age: 80
End: 2020-05-18
Payer: MEDICARE

## 2020-05-18 VITALS
WEIGHT: 185 LBS | BODY MASS INDEX: 31.58 KG/M2 | OXYGEN SATURATION: 95 % | DIASTOLIC BLOOD PRESSURE: 52 MMHG | HEIGHT: 64 IN | HEART RATE: 61 BPM | SYSTOLIC BLOOD PRESSURE: 89 MMHG | TEMPERATURE: 99 F

## 2020-05-18 PROCEDURE — 99214 OFFICE O/P EST MOD 30 MIN: CPT | Mod: 25

## 2020-05-18 PROCEDURE — 36415 COLL VENOUS BLD VENIPUNCTURE: CPT

## 2020-05-18 RX ORDER — CEPHALEXIN 500 MG/1
500 CAPSULE ORAL 3 TIMES DAILY
Qty: 21 | Refills: 0 | Status: DISCONTINUED | COMMUNITY
Start: 2020-04-20 | End: 2020-05-18

## 2020-05-18 RX ORDER — MUPIROCIN 2 G/100G
2 CREAM TOPICAL TWICE DAILY
Qty: 1 | Refills: 0 | Status: DISCONTINUED | COMMUNITY
Start: 2020-04-20 | End: 2020-05-18

## 2020-05-18 RX ORDER — FEBUXOSTAT 80 MG/1
80 TABLET ORAL DAILY
Qty: 90 | Refills: 0 | Status: DISCONTINUED | OUTPATIENT
Start: 2020-05-18 | End: 2020-05-18

## 2020-05-18 RX ORDER — METOLAZONE 2.5 MG/1
2.5 TABLET ORAL
Qty: 30 | Refills: 3 | Status: DISCONTINUED | COMMUNITY
Start: 2017-04-27 | End: 2020-05-18

## 2020-05-18 NOTE — ASSESSMENT
[FreeTextEntry1] : #1 left leg ulcer: Currently improving, patient to continue with antibiotics Bactrim extended for 3 more days.\par #2 chronic renal failure: Patient to discontinue metolazone check BMP\par #3: Low blood pressure readings: Patient asymptomatic advised to not take Navarro in tonight also advised to discontinue metolazone. Return to the office in 72 hours her blood pressure check. Patient to call office if any worsening symptoms.

## 2020-05-18 NOTE — PHYSICAL EXAM
[Normal] : no posterior cervical lymphadenopathy and no anterior cervical lymphadenopathy [de-identified] : ulceration 4cm x 4cm, granulation tissue and yellow slough

## 2020-05-18 NOTE — HISTORY OF PRESENT ILLNESS
[FreeTextEntry1] : Patient presents to the office for followup of left lower extremity ulcer [de-identified] : patient states the pain has improved, but denies any purulent drainage any fevers or chills. Denies any lightheadedness nausea vomiting syncope. Feels slightly weaker.

## 2020-05-20 LAB
ANION GAP SERPL CALC-SCNC: 15 MMOL/L
BUN SERPL-MCNC: 100 MG/DL
CALCIUM SERPL-MCNC: 9 MG/DL
CHLORIDE SERPL-SCNC: 88 MMOL/L
CO2 SERPL-SCNC: 25 MMOL/L
CREAT SERPL-MCNC: 3.77 MG/DL
GLUCOSE SERPL-MCNC: 95 MG/DL
POTASSIUM SERPL-SCNC: 4.3 MMOL/L
SODIUM SERPL-SCNC: 128 MMOL/L

## 2020-05-21 ENCOUNTER — APPOINTMENT (OUTPATIENT)
Dept: INTERNAL MEDICINE | Facility: CLINIC | Age: 80
End: 2020-05-21
Payer: MEDICARE

## 2020-05-21 VITALS
OXYGEN SATURATION: 97 % | TEMPERATURE: 97.9 F | DIASTOLIC BLOOD PRESSURE: 58 MMHG | HEIGHT: 64 IN | SYSTOLIC BLOOD PRESSURE: 93 MMHG | HEART RATE: 54 BPM | BODY MASS INDEX: 31.58 KG/M2 | WEIGHT: 185 LBS

## 2020-05-21 PROCEDURE — 36415 COLL VENOUS BLD VENIPUNCTURE: CPT

## 2020-05-21 PROCEDURE — 99214 OFFICE O/P EST MOD 30 MIN: CPT | Mod: 25

## 2020-05-24 LAB
ANION GAP SERPL CALC-SCNC: 15 MMOL/L
BUN SERPL-MCNC: 93 MG/DL
CALCIUM SERPL-MCNC: 9.6 MG/DL
CHLORIDE SERPL-SCNC: 88 MMOL/L
CO2 SERPL-SCNC: 27 MMOL/L
CREAT SERPL-MCNC: 3.77 MG/DL
GLUCOSE SERPL-MCNC: 105 MG/DL
POTASSIUM SERPL-SCNC: 4.1 MMOL/L
SODIUM SERPL-SCNC: 130 MMOL/L

## 2020-05-25 NOTE — PHYSICAL EXAM
[Normal] : normal rate, regular rhythm, normal S1 and S2 and no murmur heard [de-identified] : 4 cm x 4cm ulcer with yellow slough, red granulation tissue underneath

## 2020-05-25 NOTE — ASSESSMENT
[FreeTextEntry1] : Ulcer: slough removed, discussed importance of hydrocolloid dressing for debridement\par MERCY: secondary to diuretic use: repeat BMP today, cut enalapril in half\par Low BP: denies any presyncope or syncope, cut ACE inhibitor in half.

## 2020-05-25 NOTE — HISTORY OF PRESENT ILLNESS
[FreeTextEntry1] : Patient presents for f/u ulcer in the left lower extremity and MERCY [de-identified] : Still feels weak with lower BP, has lower extremity edema. Denies any pain in the left extremity, denies any purulent discharge.

## 2020-05-28 ENCOUNTER — APPOINTMENT (OUTPATIENT)
Dept: INTERNAL MEDICINE | Facility: CLINIC | Age: 80
End: 2020-05-28
Payer: MEDICARE

## 2020-05-28 ENCOUNTER — LABORATORY RESULT (OUTPATIENT)
Age: 80
End: 2020-05-28

## 2020-05-28 ENCOUNTER — RX RENEWAL (OUTPATIENT)
Age: 80
End: 2020-05-28

## 2020-05-28 VITALS
TEMPERATURE: 97.9 F | DIASTOLIC BLOOD PRESSURE: 49 MMHG | HEIGHT: 64 IN | WEIGHT: 185 LBS | SYSTOLIC BLOOD PRESSURE: 90 MMHG | OXYGEN SATURATION: 98 % | BODY MASS INDEX: 31.58 KG/M2 | HEART RATE: 73 BPM

## 2020-05-28 PROCEDURE — 99214 OFFICE O/P EST MOD 30 MIN: CPT | Mod: 25

## 2020-05-28 PROCEDURE — 36415 COLL VENOUS BLD VENIPUNCTURE: CPT

## 2020-05-28 RX ORDER — SULFAMETHOXAZOLE AND TRIMETHOPRIM 800; 160 MG/1; MG/1
800-160 TABLET ORAL
Qty: 8 | Refills: 0 | Status: DISCONTINUED | COMMUNITY
Start: 2020-05-11 | End: 2020-05-28

## 2020-05-29 ENCOUNTER — APPOINTMENT (OUTPATIENT)
Dept: INTERNAL MEDICINE | Facility: CLINIC | Age: 80
End: 2020-05-29
Payer: MEDICARE

## 2020-05-29 LAB
ANION GAP SERPL CALC-SCNC: 12 MMOL/L
BUN SERPL-MCNC: 67 MG/DL
CALCIUM SERPL-MCNC: 9.6 MG/DL
CHLORIDE SERPL-SCNC: 81 MMOL/L
CO2 SERPL-SCNC: 30 MMOL/L
CREAT SERPL-MCNC: 2.83 MG/DL
GLUCOSE SERPL-MCNC: 118 MG/DL
HAV IGM SER QL: NONREACTIVE
HBV CORE IGM SER QL: NONREACTIVE
HBV SURFACE AG SER QL: NONREACTIVE
HCV AB SER QL: NONREACTIVE
HCV S/CO RATIO: 0.13 S/CO
POTASSIUM SERPL-SCNC: 4.6 MMOL/L
SODIUM SERPL-SCNC: 123 MMOL/L
TSH SERPL-ACNC: 5.97 UIU/ML

## 2020-05-29 PROCEDURE — 36415 COLL VENOUS BLD VENIPUNCTURE: CPT

## 2020-05-29 PROCEDURE — 99214 OFFICE O/P EST MOD 30 MIN: CPT | Mod: 25

## 2020-05-30 LAB
ALBUMIN MFR SERPL ELPH: 61.5 %
ALBUMIN SERPL-MCNC: 3.9 G/DL
ALBUMIN/GLOB SERPL: 1.6 RATIO
ALPHA1 GLOB MFR SERPL ELPH: 4.6 %
ALPHA1 GLOB SERPL ELPH-MCNC: 0.3 G/DL
ALPHA2 GLOB MFR SERPL ELPH: 9.6 %
ALPHA2 GLOB SERPL ELPH-MCNC: 0.6 G/DL
ANION GAP SERPL CALC-SCNC: 13 MMOL/L
B-GLOBULIN MFR SERPL ELPH: 11.3 %
B-GLOBULIN SERPL ELPH-MCNC: 0.7 G/DL
BUN SERPL-MCNC: 66 MG/DL
CALCIUM SERPL-MCNC: 9.4 MG/DL
CHLORIDE SERPL-SCNC: 80 MMOL/L
CO2 SERPL-SCNC: 28 MMOL/L
CREAT SERPL-MCNC: 2.85 MG/DL
GAMMA GLOB FLD ELPH-MCNC: 0.8 G/DL
GAMMA GLOB MFR SERPL ELPH: 13 %
GLUCOSE SERPL-MCNC: 161 MG/DL
INTERPRETATION SERPL IEP-IMP: NORMAL
M PROTEIN SPEC IFE-MCNC: NORMAL
MPO AB + PR3 PNL SER: NORMAL
OSMOLALITY SERPL: 282 MOSMOL/KG
OSMOLALITY UR: 289 MOSM/KG
POTASSIUM SERPL-SCNC: 4.2 MMOL/L
PROT SERPL-MCNC: 6.4 G/DL
PROT SERPL-MCNC: 6.4 G/DL
SODIUM ?TM SUB UR QN: <20 MMOL/L
SODIUM SERPL-SCNC: 122 MMOL/L
URATE SERPL-MCNC: 4.6 MG/DL

## 2020-05-31 NOTE — ASSESSMENT
[FreeTextEntry1] : 1) hyponatremia: based on low BP and BUN/ creatinine ratio, suspect hypovolemia, patient also has renal failure and mild symptoms. Will send workup out stat including uric acid levels, urine and serum osmolality and urine sodium. To call office immediately if any new symptoms develop. Patient verbalized understanding.

## 2020-05-31 NOTE — HISTORY OF PRESENT ILLNESS
[FreeTextEntry1] : Patient presents for f/u of leg ulcer, low blood pressure and chronic renal failure [de-identified] : Leg ulcer: Has been applying hydrocolloid dressing, denies any discharge or pain\par Low Blood pressure: Taking, only triamterene HCTZ, feels slightly light headed, denies any lightheadedness when standing. Denies any headache, syncopal episodes or increasing fatigue.

## 2020-05-31 NOTE — PHYSICAL EXAM
[Normal] : normal gait, coordination grossly intact, no focal deficits and deep tendon reflexes were 2+ and symmetric [de-identified] : BP on standing 84/52 after 3 mins [de-identified] : ulcer 3 cm x 3 cm with granulation tissue present

## 2020-05-31 NOTE — ASSESSMENT
[FreeTextEntry1] : 1) Leg ulcer: clinically improving, continue with hydrocolloid dressing. No clinical signs of any infection\par 2) Hypotension: possibly secondary to dehydration, discontinue diuretics, neurologic exam wnl. Had normal stress echo 3 months ago. Check thyroid function tests\par 3) Chronic renal failure: discontinue thiazide diuretics, check uric acid levels hepatitis titers and protein and urine electrophoresis.

## 2020-05-31 NOTE — HISTORY OF PRESENT ILLNESS
[FreeTextEntry8] : Patient presents to the office for hyponatremia, feels slight weakness, denies any nausea or vomiting. Denies any confusion. Has stopped taking blood pressure medications.

## 2020-06-01 ENCOUNTER — APPOINTMENT (OUTPATIENT)
Dept: INTERNAL MEDICINE | Facility: CLINIC | Age: 80
End: 2020-06-01
Payer: MEDICARE

## 2020-06-01 VITALS — DIASTOLIC BLOOD PRESSURE: 56 MMHG | SYSTOLIC BLOOD PRESSURE: 114 MMHG

## 2020-06-01 DIAGNOSIS — R03.1 NONSPECIFIC LOW BLOOD-PRESSURE READING: ICD-10-CM

## 2020-06-01 PROCEDURE — 99213 OFFICE O/P EST LOW 20 MIN: CPT | Mod: 25

## 2020-06-01 PROCEDURE — 36415 COLL VENOUS BLD VENIPUNCTURE: CPT

## 2020-06-02 LAB
ANION GAP SERPL CALC-SCNC: 11 MMOL/L
BUN SERPL-MCNC: 57 MG/DL
CALCIUM SERPL-MCNC: 9.6 MG/DL
CHLORIDE SERPL-SCNC: 85 MMOL/L
CO2 SERPL-SCNC: 29 MMOL/L
CREAT SERPL-MCNC: 2.01 MG/DL
GLUCOSE SERPL-MCNC: 108 MG/DL
POTASSIUM SERPL-SCNC: 5.2 MMOL/L
SODIUM SERPL-SCNC: 125 MMOL/L

## 2020-06-02 NOTE — ASSESSMENT
[FreeTextEntry1] : Hyponatremia: Repeat BMP today, suspect secondary to diuresis. Patient states he is clinically improving.\par Low blood pressure reading patient has to hold blood pressure medications blood pressure today is 114/59. Return to the office in 72 hours to repeat blood work.\par Leg ulcer: clinically improving, continue current management.

## 2020-06-02 NOTE — HISTORY OF PRESENT ILLNESS
[FreeTextEntry8] : Leg ulcer: denies any pain has been applying DuoDERM dressing. Denies any purulent discharge or foul smell\par \par Hyponatremia and hypotension.. States he feels less fatigued has more energy and high her appetite. Denies any nausea or lightheadedness.

## 2020-06-02 NOTE — PHYSICAL EXAM
[Normal] : no posterior cervical lymphadenopathy and no anterior cervical lymphadenopathy [de-identified] : ulcer ~ 3cm x 3cm above left medial malleolus, granulation tissue improving

## 2020-06-04 ENCOUNTER — APPOINTMENT (OUTPATIENT)
Dept: INTERNAL MEDICINE | Facility: CLINIC | Age: 80
End: 2020-06-04
Payer: MEDICARE

## 2020-06-04 VITALS
SYSTOLIC BLOOD PRESSURE: 107 MMHG | TEMPERATURE: 98.2 F | HEIGHT: 64 IN | HEART RATE: 81 BPM | WEIGHT: 185 LBS | BODY MASS INDEX: 31.58 KG/M2 | OXYGEN SATURATION: 98 % | DIASTOLIC BLOOD PRESSURE: 63 MMHG

## 2020-06-04 PROCEDURE — 36415 COLL VENOUS BLD VENIPUNCTURE: CPT

## 2020-06-04 PROCEDURE — 99213 OFFICE O/P EST LOW 20 MIN: CPT | Mod: 25

## 2020-06-04 NOTE — HISTORY OF PRESENT ILLNESS
[FreeTextEntry1] : Patient presents for f/u of hyponatremia and leg ulcer [de-identified] : Denies any pain on the ulcer, heaviness on head has improved. Denies any nausea, vomiting, dizziness, feelings of syncope.

## 2020-06-04 NOTE — ASSESSMENT
[FreeTextEntry1] : 1) leg ulcer: improving, healthy viable tissue appreciated\par 2) Hyponatremia: improving, check BMP\par 3) low blood pressure, improving, continue off diuretics.

## 2020-06-04 NOTE — PHYSICAL EXAM
[Normal] : no carotid or abdominal bruits heard, no varicosities, pedal pulses are present, no peripheral edema, no extremity clubbing or cyanosis and no palpable aorta [de-identified] : l [de-identified] : leg ulcer 2.5 x 2.5 cm, pink tissue

## 2020-06-08 LAB
ANION GAP SERPL CALC-SCNC: 12 MMOL/L
BUN SERPL-MCNC: 40 MG/DL
CALCIUM SERPL-MCNC: 9.4 MG/DL
CHLORIDE SERPL-SCNC: 85 MMOL/L
CO2 SERPL-SCNC: 28 MMOL/L
CORTICOSTEROID BIND GLOBULIN: 2.1 MG/DL
CORTIS SERPL-MCNC: 19 UG/DL
CORTISOL, FREE: 5.4 UG/DL
CREAT SERPL-MCNC: 1.9 MG/DL
GLUCOSE SERPL-MCNC: 109 MG/DL
PFCX: 29 %
POTASSIUM SERPL-SCNC: 4.7 MMOL/L
SODIUM SERPL-SCNC: 124 MMOL/L

## 2020-06-11 ENCOUNTER — APPOINTMENT (OUTPATIENT)
Dept: INTERNAL MEDICINE | Facility: CLINIC | Age: 80
End: 2020-06-11
Payer: MEDICARE

## 2020-06-11 PROCEDURE — 36415 COLL VENOUS BLD VENIPUNCTURE: CPT

## 2020-06-15 LAB
ANION GAP SERPL CALC-SCNC: 13 MMOL/L
BUN SERPL-MCNC: 45 MG/DL
CALCIUM SERPL-MCNC: 9.2 MG/DL
CHLORIDE SERPL-SCNC: 94 MMOL/L
CO2 SERPL-SCNC: 27 MMOL/L
CREAT SERPL-MCNC: 2.01 MG/DL
GLUCOSE SERPL-MCNC: 106 MG/DL
POTASSIUM SERPL-SCNC: 4.5 MMOL/L
SODIUM SERPL-SCNC: 134 MMOL/L

## 2020-06-18 ENCOUNTER — APPOINTMENT (OUTPATIENT)
Dept: INTERNAL MEDICINE | Facility: CLINIC | Age: 80
End: 2020-06-18
Payer: MEDICARE

## 2020-06-18 VITALS
DIASTOLIC BLOOD PRESSURE: 76 MMHG | SYSTOLIC BLOOD PRESSURE: 122 MMHG | WEIGHT: 185 LBS | HEART RATE: 62 BPM | OXYGEN SATURATION: 98 % | TEMPERATURE: 98.2 F | BODY MASS INDEX: 31.58 KG/M2 | HEIGHT: 64 IN

## 2020-06-18 PROCEDURE — 36415 COLL VENOUS BLD VENIPUNCTURE: CPT

## 2020-06-18 PROCEDURE — 99213 OFFICE O/P EST LOW 20 MIN: CPT | Mod: 25

## 2020-06-19 LAB
ANION GAP SERPL CALC-SCNC: 14 MMOL/L
BUN SERPL-MCNC: 39 MG/DL
CALCIUM SERPL-MCNC: 9.4 MG/DL
CHLORIDE SERPL-SCNC: 99 MMOL/L
CO2 SERPL-SCNC: 26 MMOL/L
CREAT SERPL-MCNC: 1.85 MG/DL
GLUCOSE SERPL-MCNC: 107 MG/DL
POTASSIUM SERPL-SCNC: 4.2 MMOL/L
SODIUM SERPL-SCNC: 140 MMOL/L

## 2020-06-22 NOTE — HISTORY OF PRESENT ILLNESS
[FreeTextEntry1] : Patient presents for followup of hyponatremia hypotension and leg ulcer [de-identified] : Patient feels well overall is improving, denies any discharge. Has increased appetite denies any nausea or lightheadedness denies any difficulty with urination. Denies any decrease in urination.

## 2020-06-22 NOTE — ASSESSMENT
[FreeTextEntry1] : Leg ulcer improving, patient has no constitutional symptoms no signs of infection.\par Hyponatremia improving likely secondary to over diuresis. Blood pressure improving as well. Return to the office in 2 weeks

## 2020-06-22 NOTE — PHYSICAL EXAM
[Normal] : no posterior cervical lymphadenopathy and no anterior cervical lymphadenopathy [de-identified] : 2 cm x 2cm ulcer, granulation tissue

## 2020-06-28 ENCOUNTER — RX RENEWAL (OUTPATIENT)
Age: 80
End: 2020-06-28

## 2020-06-30 ENCOUNTER — APPOINTMENT (OUTPATIENT)
Dept: UROLOGY | Facility: CLINIC | Age: 80
End: 2020-06-30

## 2020-07-02 ENCOUNTER — APPOINTMENT (OUTPATIENT)
Dept: INTERNAL MEDICINE | Facility: CLINIC | Age: 80
End: 2020-07-02
Payer: MEDICARE

## 2020-07-02 PROCEDURE — 36415 COLL VENOUS BLD VENIPUNCTURE: CPT

## 2020-07-07 ENCOUNTER — APPOINTMENT (OUTPATIENT)
Dept: INTERNAL MEDICINE | Facility: CLINIC | Age: 80
End: 2020-07-07
Payer: MEDICARE

## 2020-07-07 ENCOUNTER — APPOINTMENT (OUTPATIENT)
Dept: ORTHOPEDIC SURGERY | Facility: CLINIC | Age: 80
End: 2020-07-07
Payer: MEDICARE

## 2020-07-07 ENCOUNTER — APPOINTMENT (OUTPATIENT)
Dept: ORTHOPEDIC SURGERY | Facility: CLINIC | Age: 80
End: 2020-07-07

## 2020-07-07 VITALS
WEIGHT: 185 LBS | DIASTOLIC BLOOD PRESSURE: 68 MMHG | BODY MASS INDEX: 30.82 KG/M2 | HEART RATE: 63 BPM | SYSTOLIC BLOOD PRESSURE: 120 MMHG | HEIGHT: 65 IN

## 2020-07-07 VITALS
OXYGEN SATURATION: 99 % | TEMPERATURE: 97.8 F | DIASTOLIC BLOOD PRESSURE: 89 MMHG | HEART RATE: 65 BPM | SYSTOLIC BLOOD PRESSURE: 144 MMHG

## 2020-07-07 DIAGNOSIS — M16.12 UNILATERAL PRIMARY OSTEOARTHRITIS, LEFT HIP: ICD-10-CM

## 2020-07-07 LAB
25(OH)D3 SERPL-MCNC: 70.1 NG/ML
ANION GAP SERPL CALC-SCNC: 15 MMOL/L
BASOPHILS # BLD AUTO: 0.05 K/UL
BASOPHILS NFR BLD AUTO: 0.7 %
BUN SERPL-MCNC: 38 MG/DL
CALCIUM SERPL-MCNC: 10 MG/DL
CALCIUM SERPL-MCNC: 10 MG/DL
CHLORIDE SERPL-SCNC: 97 MMOL/L
CO2 SERPL-SCNC: 27 MMOL/L
CREAT SERPL-MCNC: 1.66 MG/DL
EOSINOPHIL # BLD AUTO: 0.15 K/UL
EOSINOPHIL NFR BLD AUTO: 2.1 %
GLUCOSE SERPL-MCNC: 115 MG/DL
HCT VFR BLD CALC: 39.1 %
HGB BLD-MCNC: 11.8 G/DL
IMM GRANULOCYTES NFR BLD AUTO: 0.1 %
LYMPHOCYTES # BLD AUTO: 1.42 K/UL
LYMPHOCYTES NFR BLD AUTO: 20.2 %
MAN DIFF?: NORMAL
MCHC RBC-ENTMCNC: 27.3 PG
MCHC RBC-ENTMCNC: 30.2 GM/DL
MCV RBC AUTO: 90.3 FL
MONOCYTES # BLD AUTO: 0.66 K/UL
MONOCYTES NFR BLD AUTO: 9.4 %
NEUTROPHILS # BLD AUTO: 4.73 K/UL
NEUTROPHILS NFR BLD AUTO: 67.5 %
PARATHYROID HORMONE INTACT: 44 PG/ML
PHOSPHATE SERPL-MCNC: 4.1 MG/DL
PLATELET # BLD AUTO: 234 K/UL
POTASSIUM SERPL-SCNC: 5.3 MMOL/L
RBC # BLD: 4.33 M/UL
RBC # FLD: 15.7 %
SODIUM SERPL-SCNC: 139 MMOL/L
URATE SERPL-MCNC: 5.2 MG/DL
WBC # FLD AUTO: 7.02 K/UL

## 2020-07-07 PROCEDURE — 99204 OFFICE O/P NEW MOD 45 MIN: CPT

## 2020-07-07 PROCEDURE — 99214 OFFICE O/P EST MOD 30 MIN: CPT

## 2020-07-07 PROCEDURE — 73502 X-RAY EXAM HIP UNI 2-3 VIEWS: CPT | Mod: LT

## 2020-07-07 PROCEDURE — 73564 X-RAY EXAM KNEE 4 OR MORE: CPT | Mod: LT

## 2020-07-07 NOTE — DISCUSSION/SUMMARY
[de-identified] : This patient is severe left hip osteoarthritis.  The patient is not an appropriate candidate for surgical intervention at this time.  This is because he has recurrent ulcerations of his bilateral lower extremities and swelling with venous stasis.  Having recurrent ulceration secondary to venous stasis is a significant risk factor for periprosthetic joint infection in the immediate perioperative period after total joint replacement as well as in the long-term..  Additionally is at increased risk because he has chronic kidney disease for complication from a total hip arthroplasty.  An extensive discussion was conducted on the natural history of the disease and the variety of surgical and non-surgical options available to the patient including, but not limited to non-steroidal anti-inflammatory medications, steroid injections, physical therapy, maintenance of ideal body weight, and reduction of activity.  I suggested that he follow-up with pain management for other pain management strategies as he cannot take NSAIDs.  Also recommended he follow-up with a vascular medicine doctor for evaluation of his chronic lower extremity swelling and ulcerations.  The patient will schedule an appointment as needed.\par

## 2020-07-07 NOTE — REVIEW OF SYSTEMS
[Joint Stiffness] : joint stiffness [Joint Pain] : joint pain [Negative] : Endocrine [Arthralgia] : arthralgia

## 2020-07-07 NOTE — HISTORY OF PRESENT ILLNESS
[de-identified] : This is a 80-year-old male experiencing left hip pain, which is severe in intensity.  Patient locates pain to anterior thigh.  Patient states pain started approx 2 years ago.  The pain substantially limits activities of daily living. Walking tolerance is reduced. The has had PT for pain in past with minimal relief. Patient does not take any medication for pain, has not modified acitivities.  He cannot take NSAIDs because he has kidney disease.  Patient does use a cane for ambulation.The patient denies any radiation of the pain to the feet and it is not associated with numbness, tingling, or weakness.  He stopped taking his water pills approximately 1 week ago which resulted in blisters of his lower extremities and erythema and is now being treated for cellulitis.  He is on antibiotics for this.  This is a recurrent problem for him.

## 2020-07-07 NOTE — PHYSICAL EXAM
[de-identified] : Patient is well nourished, well-developed, in no acute distress, with appropriate mood and affect. The patient is oriented to time, place, and person. Respirations are even and unlabored. Gait evaluation reveals a limp. There is no inguinal adenopathy. Examination of the contralateral hip shows normal range of motion, strength, no tenderness, and intact skin. The affected limb is well-perfused, shows a grossly normal motor and sensory examination. Examination of the hip shows no skin lesions. Hip motion is reduced and causes pain. FADIR is positive and ANDREW is positive. Stinchfield test is positive. Leg lengths are approximately 1 cm short on the left. Both hips are stable and muscle strength is normal. \par Left knee motion is painless and the knee moves from 0 to 135 degrees. The knee is stable within that range-of-motion to AP and ML stress with a 1A Lachman, negative anterior or posterior drawer and no instability to varus or valgus stress. The alignment of the knee is 5 degrees varus. No effusion or crepitus is noted. No tenderness to palpation about the medial or lateral joint line, medial or lateral tibial plateau, medial or lateral femoral condyle, medial or lateral patellar facets, superior or inferior pole of the patella. Velia's is negative. Muscle strength is normal. Pedal pulses are palpable.  [de-identified] : AP and lateral x-rays of the left hip, pelvis, and femur were ordered and taken in the office and demonstrate degenerative joint disease of the hip with joint space narrowing, osteophyte formation, and subchondral sclerosis.\par \par Long standing knee, AP knee, lateral knee, and patellar views of the left knee were ordered and taken in the office and demonstrate no evidence of degenerative joint disease of the knee, fractures, intra-articular pathology.

## 2020-07-10 NOTE — HISTORY OF PRESENT ILLNESS
[FreeTextEntry1] : Patient presents due to ulcers in both legs [de-identified] : Patient presents to the office due to multiple ulcerations of both legs. Except for swollen and become fluid filled which burst. Patient denies any pain or erythema. Sore orthopedic surgeon and unable to do surgery due to ulcers. Has seen vascular surgeon as well.

## 2020-07-10 NOTE — ASSESSMENT
[FreeTextEntry1] : Ulcers on both legs may be secondary to venous stasis states he has seen vascular surgeon also am secondary to chronic kidney disease and lymphedema. Current dressing applied advised to use silver sulfadiazine cream and to follow up with wound care. Patient to return to the office in one week. To double the dose of furosemide and take 40 mg daily.

## 2020-07-10 NOTE — PHYSICAL EXAM
[Normal] : normal rate, regular rhythm, normal S1 and S2 and no murmur heard [de-identified] : multiple ulcerations of both legs, lower extremity swelling

## 2020-07-13 ENCOUNTER — APPOINTMENT (OUTPATIENT)
Dept: INTERNAL MEDICINE | Facility: CLINIC | Age: 80
End: 2020-07-13
Payer: MEDICARE

## 2020-07-13 PROCEDURE — 36415 COLL VENOUS BLD VENIPUNCTURE: CPT

## 2020-07-14 ENCOUNTER — APPOINTMENT (OUTPATIENT)
Dept: INTERNAL MEDICINE | Facility: CLINIC | Age: 80
End: 2020-07-14
Payer: MEDICARE

## 2020-07-14 LAB
ANION GAP SERPL CALC-SCNC: 12 MMOL/L
BUN SERPL-MCNC: 36 MG/DL
CALCIUM SERPL-MCNC: 9.1 MG/DL
CHLORIDE SERPL-SCNC: 86 MMOL/L
CO2 SERPL-SCNC: 27 MMOL/L
CREAT SERPL-MCNC: 1.99 MG/DL
GLUCOSE SERPL-MCNC: 126 MG/DL
POTASSIUM SERPL-SCNC: 4.3 MMOL/L
SODIUM SERPL-SCNC: 125 MMOL/L

## 2020-07-14 PROCEDURE — 36415 COLL VENOUS BLD VENIPUNCTURE: CPT

## 2020-07-14 PROCEDURE — 99214 OFFICE O/P EST MOD 30 MIN: CPT | Mod: 25

## 2020-07-15 ENCOUNTER — APPOINTMENT (OUTPATIENT)
Dept: WOUND CARE | Facility: CLINIC | Age: 80
End: 2020-07-15
Payer: MEDICARE

## 2020-07-15 VITALS — BODY MASS INDEX: 30.82 KG/M2 | WEIGHT: 185 LBS | TEMPERATURE: 97.8 F | HEIGHT: 65 IN

## 2020-07-15 PROCEDURE — 99204 OFFICE O/P NEW MOD 45 MIN: CPT

## 2020-07-15 PROCEDURE — 11042 DBRDMT SUBQ TIS 1ST 20SQCM/<: CPT

## 2020-07-15 RX ORDER — METOLAZONE 2.5 MG/1
2.5 TABLET ORAL
Refills: 0 | Status: DISCONTINUED | COMMUNITY
End: 2020-07-15

## 2020-07-15 NOTE — PLAN
[FreeTextEntry1] : Plan:\par Apply lidocaine or topical anesthetic if needed to reduce pain upon washing the wound.\par Wash wound with ----0.9% saline or Dove skin sensitive soap and clean water \par Apply ----foam\par Apply ----multi-layer compression bandage or compression leg wrap\par Change dressing --- 3 times per week.\par Leg elevation as tolerated\par Encouraged ambulation or exercise.\par Optimization of nutrition.\par Offloading to the wound site.\par \par Wound supplies ordered via DME\par Patient given contact information to DME\par \par Homecare orders placed\par \par Follow up appointment scheduled for 1 week in office\par \par TeleHealth Services discussed with the patient and/or family.  Discharge instructions given including download of [facility] information regarding:\par 1)  [facility] to obtain medical records\par 2)  [facility] to conduct Face-to-Face TeleHealth visit\par 3)  [employer] for the Patient (patient takes a picture of their wound which is sent to the patient's chart for review)\par \par

## 2020-07-15 NOTE — PHYSICAL EXAM
[Please See PDF for [employer] Analytics] : Please See PDF for [employer]. [Normal Breath Sounds] : Normal breath sounds [JVD] : no jugular venous distention  [2+] : left 2+ [Ankle Swelling Bilaterally] : bilaterally  [Ankle Swelling (On Exam)] : present [Abdomen Tenderness] : ~T ~M No abdominal tenderness [Ankle Swelling On The Left] : moderate [Skin Ulcer] : ulcer [Purpura] : no purpura  [Petechiae] : no petechiae [Alert] : alert [Skin Induration] : no induration [Oriented to Time] : oriented to time [Oriented to Person] : oriented to person [Oriented to Place] : oriented to place [Calm] : calm [de-identified] : NAD, uses walker because of arthritis [de-identified] : supple [de-identified] : AT [de-identified] : soft

## 2020-07-15 NOTE — HISTORY OF PRESENT ILLNESS
[FreeTextEntry1] : Mr. NICOLLE COBB   presents to the office with a wound for 3 weeks duration after not taking his Lasix for a week..  The wounds are located on  the bilateral legs .  The patient has complaints of swelling.   The patient has been dressing the wound with Xeroform, gauze, velvet, and an Ace wrap.  The patient denies fevers or chills.  The patient has localized pain to the wound upon dressing changes.  The patient has no other complaints or associated symptoms.  HbA1c is 6.8 from May 7th.\par \par Saw his vascular doctor 45 days ago as per Patient. \par

## 2020-07-15 NOTE — ASSESSMENT
[FreeTextEntry1] : The patient presents with wounds to the bilateral legs.  Swelling noted to the extremity.  \par HERNANDO/PVR and standing venous reflux study scheduled.  Patient declined testing today\par No clinical sign of infection\par Foam applied, then covered with velvet and ace wrap\par Measurements\par Ankle- 30.5 cm\par Calf- 45 cm\par Length- 37 cm

## 2020-07-15 NOTE — REVIEW OF SYSTEMS
[Limb Swelling] : limb swelling [Skin Wound] : skin wound [Negative] : Endocrine [FreeTextEntry5] : Hx HTN [FreeTextEntry9] : Hx Gout and Arthritis [FreeTextEntry8] : Hx bladder cancer

## 2020-07-16 ENCOUNTER — APPOINTMENT (OUTPATIENT)
Dept: INTERNAL MEDICINE | Facility: CLINIC | Age: 80
End: 2020-07-16
Payer: MEDICARE

## 2020-07-16 PROCEDURE — 36415 COLL VENOUS BLD VENIPUNCTURE: CPT

## 2020-07-16 PROCEDURE — 99214 OFFICE O/P EST MOD 30 MIN: CPT | Mod: 25

## 2020-07-18 LAB
ANION GAP SERPL CALC-SCNC: 14 MMOL/L
BUN SERPL-MCNC: 32 MG/DL
CALCIUM SERPL-MCNC: 9.1 MG/DL
CHLORIDE SERPL-SCNC: 87 MMOL/L
CO2 SERPL-SCNC: 27 MMOL/L
CREAT SERPL-MCNC: 1.73 MG/DL
GLUCOSE SERPL-MCNC: 156 MG/DL
POTASSIUM SERPL-SCNC: 4.4 MMOL/L
SODIUM SERPL-SCNC: 128 MMOL/L

## 2020-07-21 NOTE — ASSESSMENT
[FreeTextEntry1] : 1) Leg ulcers: Called and found appointment for wound care tomorrow at 9 am. Wounds cleaned, and bandages placed\par 2) Hyponatremia: continue to monitor closely, return to office in 2 days to repeat, take lasix once daily.

## 2020-07-21 NOTE — PHYSICAL EXAM
[Normal] : affect was normal and insight and judgment were intact [de-identified] : varicosities present [de-identified] : wounds with necrotic tissue and foul smelling odor

## 2020-07-21 NOTE — HISTORY OF PRESENT ILLNESS
[FreeTextEntry8] : Patient presents to the office for wounds of the leg and hyponatremia. Wounds have increased and leg swelling has increased with clear drainage. Has followed up with vascular surgery. Denies any pain, fevers or chills.

## 2020-07-23 VITALS
BODY MASS INDEX: 30.99 KG/M2 | HEART RATE: 56 BPM | DIASTOLIC BLOOD PRESSURE: 72 MMHG | RESPIRATION RATE: 14 BRPM | SYSTOLIC BLOOD PRESSURE: 118 MMHG | OXYGEN SATURATION: 98 % | HEIGHT: 65 IN | TEMPERATURE: 98.5 F | WEIGHT: 186 LBS

## 2020-07-24 LAB
ANION GAP SERPL CALC-SCNC: 12 MMOL/L
ANION GAP SERPL CALC-SCNC: 13 MMOL/L
BASOPHILS # BLD AUTO: 0.07 K/UL
BASOPHILS NFR BLD AUTO: 0.9 %
BUN SERPL-MCNC: 30 MG/DL
BUN SERPL-MCNC: 31 MG/DL
CALCIUM SERPL-MCNC: 9 MG/DL
CALCIUM SERPL-MCNC: 9.1 MG/DL
CHLORIDE SERPL-SCNC: 89 MMOL/L
CHLORIDE SERPL-SCNC: 89 MMOL/L
CO2 SERPL-SCNC: 30 MMOL/L
CO2 SERPL-SCNC: 31 MMOL/L
CREAT SERPL-MCNC: 1.69 MG/DL
CREAT SERPL-MCNC: 1.69 MG/DL
EOSINOPHIL # BLD AUTO: 0.17 K/UL
EOSINOPHIL NFR BLD AUTO: 2.2 %
GLUCOSE SERPL-MCNC: 107 MG/DL
GLUCOSE SERPL-MCNC: 107 MG/DL
HCT VFR BLD CALC: 37.7 %
HGB BLD-MCNC: 11.5 G/DL
IMM GRANULOCYTES NFR BLD AUTO: 0.4 %
LYMPHOCYTES # BLD AUTO: 1.21 K/UL
LYMPHOCYTES NFR BLD AUTO: 15.4 %
MAN DIFF?: NORMAL
MCHC RBC-ENTMCNC: 27.4 PG
MCHC RBC-ENTMCNC: 30.5 GM/DL
MCV RBC AUTO: 90 FL
MONOCYTES # BLD AUTO: 0.69 K/UL
MONOCYTES NFR BLD AUTO: 8.8 %
NEUTROPHILS # BLD AUTO: 5.71 K/UL
NEUTROPHILS NFR BLD AUTO: 72.3 %
PLATELET # BLD AUTO: 205 K/UL
POTASSIUM SERPL-SCNC: 4.1 MMOL/L
POTASSIUM SERPL-SCNC: 4.2 MMOL/L
RBC # BLD: 4.19 M/UL
RBC # FLD: 14.6 %
SODIUM SERPL-SCNC: 131 MMOL/L
SODIUM SERPL-SCNC: 132 MMOL/L
WBC # FLD AUTO: 7.88 K/UL

## 2020-07-24 NOTE — PHYSICAL EXAM
[de-identified] : Large ulcer, erythematous base left front shin, ulcer behing leg necrotic base. Foul smelling, Ulcer on right leg [Normal] : no joint swelling and grossly normal strength and tone

## 2020-07-24 NOTE — ASSESSMENT
[FreeTextEntry1] : Edema and ulcers: Continue to monitor sodium, coordinated to have appointment for wound care tomorrow.

## 2020-07-24 NOTE — HISTORY OF PRESENT ILLNESS
[FreeTextEntry8] : Patient presents to the office with worsening ulcers of both legs, foul smelling. Leg swelling has subsided. Denies any fevers or chills.

## 2020-07-31 ENCOUNTER — APPOINTMENT (OUTPATIENT)
Dept: INTERNAL MEDICINE | Facility: CLINIC | Age: 80
End: 2020-07-31

## 2020-08-06 ENCOUNTER — APPOINTMENT (OUTPATIENT)
Dept: INTERNAL MEDICINE | Facility: CLINIC | Age: 80
End: 2020-08-06
Payer: MEDICARE

## 2020-08-06 ENCOUNTER — NON-APPOINTMENT (OUTPATIENT)
Age: 80
End: 2020-08-06

## 2020-08-06 VITALS — DIASTOLIC BLOOD PRESSURE: 68 MMHG | SYSTOLIC BLOOD PRESSURE: 131 MMHG | HEART RATE: 45 BPM | OXYGEN SATURATION: 97 %

## 2020-08-06 PROCEDURE — 99214 OFFICE O/P EST MOD 30 MIN: CPT | Mod: 25

## 2020-08-06 PROCEDURE — 36415 COLL VENOUS BLD VENIPUNCTURE: CPT

## 2020-08-06 RX ORDER — CEFADROXIL 500 MG/1
500 CAPSULE ORAL
Qty: 6 | Refills: 0 | Status: DISCONTINUED | COMMUNITY
Start: 2020-07-02 | End: 2020-08-06

## 2020-08-08 LAB
ALBUMIN SERPL ELPH-MCNC: 4.2 G/DL
ALP BLD-CCNC: 61 U/L
ALT SERPL-CCNC: 29 U/L
ANION GAP SERPL CALC-SCNC: 14 MMOL/L
APPEARANCE: CLEAR
APPEARANCE: CLEAR
AST SERPL-CCNC: 28 U/L
BACTERIA: NEGATIVE
BASOPHILS # BLD AUTO: 0.06 K/UL
BASOPHILS NFR BLD AUTO: 1 %
BILIRUB SERPL-MCNC: 0.6 MG/DL
BILIRUBIN URINE: NEGATIVE
BILIRUBIN URINE: NEGATIVE
BLOOD URINE: NEGATIVE
BLOOD URINE: NEGATIVE
BUN SERPL-MCNC: 35 MG/DL
CALCIUM SERPL-MCNC: 9.4 MG/DL
CHLORIDE SERPL-SCNC: 91 MMOL/L
CO2 SERPL-SCNC: 33 MMOL/L
COLOR: COLORLESS
COLOR: COLORLESS
CREAT SERPL-MCNC: 1.59 MG/DL
EOSINOPHIL # BLD AUTO: 0.14 K/UL
EOSINOPHIL NFR BLD AUTO: 2.3 %
GLUCOSE QUALITATIVE U: NEGATIVE
GLUCOSE QUALITATIVE U: NEGATIVE
GLUCOSE SERPL-MCNC: 112 MG/DL
HCT VFR BLD CALC: 40.1 %
HGB BLD-MCNC: 12.1 G/DL
HYALINE CASTS: 0 /LPF
IMM GRANULOCYTES NFR BLD AUTO: 0.3 %
KETONES URINE: NEGATIVE
KETONES URINE: NEGATIVE
LEUKOCYTE ESTERASE URINE: NEGATIVE
LEUKOCYTE ESTERASE URINE: NEGATIVE
LYMPHOCYTES # BLD AUTO: 1.24 K/UL
LYMPHOCYTES NFR BLD AUTO: 20.5 %
MAN DIFF?: NORMAL
MCHC RBC-ENTMCNC: 26.9 PG
MCHC RBC-ENTMCNC: 30.2 GM/DL
MCV RBC AUTO: 89.1 FL
MICROSCOPIC-UA: NORMAL
MONOCYTES # BLD AUTO: 0.53 K/UL
MONOCYTES NFR BLD AUTO: 8.8 %
NEUTROPHILS # BLD AUTO: 4.06 K/UL
NEUTROPHILS NFR BLD AUTO: 67.1 %
NITRITE URINE: NEGATIVE
NITRITE URINE: NEGATIVE
OSMOLALITY SERPL: 291 MOSMOL/KG
OSMOLALITY UR: 179 MOSM/KG
PH URINE: 6.5
PH URINE: 6.5
PLATELET # BLD AUTO: 208 K/UL
POTASSIUM SERPL-SCNC: 4.1 MMOL/L
PROT SERPL-MCNC: 6.5 G/DL
PROTEIN URINE: NEGATIVE
PROTEIN URINE: NEGATIVE
RBC # BLD: 4.5 M/UL
RBC # FLD: 14.5 %
RED BLOOD CELLS URINE: 1 /HPF
SODIUM ?TM SUB UR QN: 36 MMOL/L
SODIUM SERPL-SCNC: 137 MMOL/L
SPECIFIC GRAVITY URINE: 1
SPECIFIC GRAVITY URINE: 1
SQUAMOUS EPITHELIAL CELLS: 0 /HPF
UROBILINOGEN URINE: NORMAL
UROBILINOGEN URINE: NORMAL
WBC # FLD AUTO: 6.05 K/UL
WHITE BLOOD CELLS URINE: 0 /HPF

## 2020-08-08 NOTE — HISTORY OF PRESENT ILLNESS
[No Pertinent Cardiac History] : no history of aortic stenosis, atrial fibrillation, coronary artery disease, recent myocardial infarction, or implantable device/pacemaker [No Adverse Anesthesia Reaction] : no adverse anesthesia reaction in self or family member [No Pertinent Pulmonary History] : no history of asthma, COPD, sleep apnea, or smoking [Chronic Anticoagulation] : chronic anticoagulation [Chronic Kidney Disease] : chronic kidney disease [(Patient denies any chest pain, claudication, dyspnea on exertion, orthopnea, palpitations or syncope)] : Patient denies any chest pain, claudication, dyspnea on exertion, orthopnea, palpitations or syncope [Poor (<4 METs)] : Poor (<4 METs) [Anticoagulants: _____] : Anticoagulants: [unfilled] [FreeTextEntry1] : cystoscopy with biopsy [FreeTextEntry8] : Limited secondary to left leg OA [FreeTextEntry7] : Had unremarkable stress test and echo 1/2020

## 2020-08-08 NOTE — ASSESSMENT
[High Risk Surgery - Intraperitoneal, Intrathoracic or Supringuinal Vascular Procedures] : High Risk Surgery - Intraperitoneal, Intrathoracic or Supringuinal Vascular Procedures - No (0) [Ischemic Heart Disease] : Ischemic Heart Disease - No (0) [Congestive Heart Failure] : Congestive Heart Failure - No (0) [Prior Cerebrovascular Accident or TIA] : Prior Cerebrovascular Accident or TIA - No (0) [Creatinine >= 2mg/dL (1 Point)] : Creatinine >= 2mg/dL - No (0) [Insulin-dependent Diabetic (1 Point)] : Insulin-dependent Diabetic - No (0) [0] : 0 , RCRI Class: I, Risk of Post-Op Cardiac Complications: 3.9%, 95% CI for Risk Estimate: 2.8% - 5.4% [Patient Optimized for Surgery] : Patient optimized for surgery [Modify anticoagulant treatment prior to procedure] : Modify anticoagulant treatment prior to procedure [FreeTextEntry5] : d/c 3 days before procedure

## 2020-08-10 ENCOUNTER — NON-APPOINTMENT (OUTPATIENT)
Age: 80
End: 2020-08-10

## 2020-08-10 ENCOUNTER — APPOINTMENT (OUTPATIENT)
Dept: WOUND CARE | Facility: CLINIC | Age: 80
End: 2020-08-10
Payer: MEDICARE

## 2020-08-10 DIAGNOSIS — I89.0 LYMPHEDEMA, NOT ELSEWHERE CLASSIFIED: ICD-10-CM

## 2020-08-10 PROCEDURE — 93970 EXTREMITY STUDY: CPT

## 2020-08-10 PROCEDURE — G0179 MD RECERTIFICATION HHA PT: CPT

## 2020-08-10 PROCEDURE — 93923 UPR/LXTR ART STDY 3+ LVLS: CPT

## 2020-08-13 ENCOUNTER — APPOINTMENT (OUTPATIENT)
Dept: INTERNAL MEDICINE | Facility: CLINIC | Age: 80
End: 2020-08-13

## 2020-08-16 ENCOUNTER — NON-APPOINTMENT (OUTPATIENT)
Age: 80
End: 2020-08-16

## 2020-08-20 ENCOUNTER — APPOINTMENT (OUTPATIENT)
Dept: INTERNAL MEDICINE | Facility: CLINIC | Age: 80
End: 2020-08-20
Payer: MEDICARE

## 2020-08-20 VITALS
OXYGEN SATURATION: 97 % | HEART RATE: 67 BPM | HEIGHT: 65 IN | WEIGHT: 186 LBS | DIASTOLIC BLOOD PRESSURE: 65 MMHG | TEMPERATURE: 98 F | SYSTOLIC BLOOD PRESSURE: 131 MMHG | BODY MASS INDEX: 30.99 KG/M2

## 2020-08-20 PROCEDURE — 36415 COLL VENOUS BLD VENIPUNCTURE: CPT

## 2020-08-20 PROCEDURE — 99213 OFFICE O/P EST LOW 20 MIN: CPT | Mod: 25

## 2020-08-23 NOTE — HISTORY OF PRESENT ILLNESS
[de-identified] : Feels well. denies any lightheadedness or dizziness. Wounds are improving. Alternating dosing of diuretics.  [FreeTextEntry1] : Patient presents for f/u of hyponatremia

## 2020-08-23 NOTE — PHYSICAL EXAM
[de-identified] : compression bandages on legs [Normal] : affect was normal and insight and judgment were intact

## 2020-08-23 NOTE — ASSESSMENT
[FreeTextEntry1] : Repeat sodium and check kidney function. To f/u with vascular for right sided vascular insufficiency.

## 2020-08-27 LAB
ANION GAP SERPL CALC-SCNC: 10 MMOL/L
ANION GAP SERPL CALC-SCNC: 12 MMOL/L
BUN SERPL-MCNC: 27 MG/DL
BUN SERPL-MCNC: 36 MG/DL
CALCIUM SERPL-MCNC: 9.2 MG/DL
CALCIUM SERPL-MCNC: 9.4 MG/DL
CHLORIDE SERPL-SCNC: 88 MMOL/L
CHLORIDE SERPL-SCNC: 92 MMOL/L
CO2 SERPL-SCNC: 32 MMOL/L
CO2 SERPL-SCNC: 33 MMOL/L
CREAT SERPL-MCNC: 1.48 MG/DL
CREAT SERPL-MCNC: 1.75 MG/DL
GLUCOSE SERPL-MCNC: 108 MG/DL
GLUCOSE SERPL-MCNC: 113 MG/DL
POTASSIUM SERPL-SCNC: 4 MMOL/L
POTASSIUM SERPL-SCNC: 4.3 MMOL/L
SODIUM SERPL-SCNC: 130 MMOL/L
SODIUM SERPL-SCNC: 136 MMOL/L

## 2020-08-28 ENCOUNTER — APPOINTMENT (OUTPATIENT)
Dept: INTERNAL MEDICINE | Facility: CLINIC | Age: 80
End: 2020-08-28
Payer: MEDICARE

## 2020-08-28 PROCEDURE — 99214 OFFICE O/P EST MOD 30 MIN: CPT | Mod: 25

## 2020-08-28 PROCEDURE — 36415 COLL VENOUS BLD VENIPUNCTURE: CPT

## 2020-08-30 LAB
ANION GAP SERPL CALC-SCNC: 12 MMOL/L
BUN SERPL-MCNC: 27 MG/DL
CALCIUM SERPL-MCNC: 8.9 MG/DL
CHLORIDE SERPL-SCNC: 82 MMOL/L
CO2 SERPL-SCNC: 32 MMOL/L
CREAT SERPL-MCNC: 1.55 MG/DL
GLUCOSE SERPL-MCNC: 125 MG/DL
POTASSIUM SERPL-SCNC: 3.6 MMOL/L
SODIUM SERPL-SCNC: 126 MMOL/L

## 2020-08-31 ENCOUNTER — APPOINTMENT (OUTPATIENT)
Dept: ULTRASOUND IMAGING | Facility: CLINIC | Age: 80
End: 2020-08-31

## 2020-09-01 NOTE — HISTORY OF PRESENT ILLNESS
[FreeTextEntry8] : Patient presents for scrotal edema hyponatremia and chronic kidney disease. No scrotal edema after having procedure for prostate and urologist, no further recommendations as per urologist. Denies any dyspnea orthopnea paroxysmal nocturnal dyspnea. Denies any changes in medications. Has seen cardiologist within the past year.

## 2020-09-01 NOTE — ASSESSMENT
[FreeTextEntry1] : Scrotal swelling may be secondary to chronic kidney disease, patient had normal echocardiogram. Also to have a Doppler ultrasound and also ultrasound of the scrotum. Rule out any thrombosis of blood vessels. Will assess a sodium levels and creatinine kidney disease before increasing furosemide.

## 2020-09-01 NOTE — PHYSICAL EXAM
[Normal] : no CVA or spinal tenderness [de-identified] : 1+ pitting edema [de-identified] : edematous scrotum

## 2020-09-02 ENCOUNTER — APPOINTMENT (OUTPATIENT)
Dept: ULTRASOUND IMAGING | Facility: CLINIC | Age: 80
End: 2020-09-02
Payer: MEDICARE

## 2020-09-02 PROCEDURE — 76856 US EXAM PELVIC COMPLETE: CPT

## 2020-09-02 PROCEDURE — 76870 US EXAM SCROTUM: CPT

## 2020-09-09 ENCOUNTER — APPOINTMENT (OUTPATIENT)
Dept: CT IMAGING | Facility: CLINIC | Age: 80
End: 2020-09-09
Payer: MEDICARE

## 2020-09-09 ENCOUNTER — OUTPATIENT (OUTPATIENT)
Dept: OUTPATIENT SERVICES | Facility: HOSPITAL | Age: 80
LOS: 1 days | End: 2020-09-09
Payer: MEDICARE

## 2020-09-09 DIAGNOSIS — R18.8 OTHER ASCITES: ICD-10-CM

## 2020-09-09 DIAGNOSIS — C67.9 MALIGNANT NEOPLASM OF BLADDER, UNSPECIFIED: Chronic | ICD-10-CM

## 2020-09-09 PROCEDURE — 74177 CT ABD & PELVIS W/CONTRAST: CPT | Mod: 26

## 2020-09-09 PROCEDURE — 74177 CT ABD & PELVIS W/CONTRAST: CPT

## 2020-09-14 ENCOUNTER — APPOINTMENT (OUTPATIENT)
Dept: INTERNAL MEDICINE | Facility: CLINIC | Age: 80
End: 2020-09-14
Payer: MEDICARE

## 2020-09-14 DIAGNOSIS — N50.89 OTHER SPECIFIED DISORDERS OF THE MALE GENITAL ORGANS: ICD-10-CM

## 2020-09-14 DIAGNOSIS — R74.8 ABNORMAL LEVELS OF OTHER SERUM ENZYMES: ICD-10-CM

## 2020-09-14 DIAGNOSIS — Z23 ENCOUNTER FOR IMMUNIZATION: ICD-10-CM

## 2020-09-14 PROCEDURE — 99214 OFFICE O/P EST MOD 30 MIN: CPT | Mod: 25

## 2020-09-14 PROCEDURE — G0008: CPT

## 2020-09-14 PROCEDURE — 90662 IIV NO PRSV INCREASED AG IM: CPT

## 2020-09-15 ENCOUNTER — LABORATORY RESULT (OUTPATIENT)
Age: 80
End: 2020-09-15

## 2020-09-18 NOTE — HISTORY OF PRESENT ILLNESS
[FreeTextEntry8] : Patient presents to the office continues to have scrotal swelling. Also has swelling of the legs. Following with wound care and vascular. CT of the abdomen and pelvis showed mild ascites, no obstruction of any blood vessels, has some bruising. Denies any chest pain, shortness of breath or palpitations

## 2020-09-18 NOTE — ASSESSMENT
[FreeTextEntry1] : Check kidney function today and uric acid influenza administered for ascites to have GI will consult given heterogeneous contour of the liver assess for other etiologies of elevated liver enzymes. Advised to follow up with urology for scrotal swelling. Blood pressure stable

## 2020-09-18 NOTE — PHYSICAL EXAM
[Normal] : affect was normal and insight and judgment were intact [de-identified] : lower extremity non pitting edema [de-identified] : ecchymoses present

## 2020-09-19 LAB
25(OH)D3 SERPL-MCNC: 53.7 NG/ML
ALBUMIN SERPL ELPH-MCNC: 4.1 G/DL
ALP BLD-CCNC: 106 U/L
ALT SERPL-CCNC: 37 U/L
ANA PAT FLD IF-IMP: ABNORMAL
ANA PATTERN: ABNORMAL
ANA SER IF-ACNC: ABNORMAL
ANA TITER: ABNORMAL
ANION GAP SERPL CALC-SCNC: 13 MMOL/L
APPEARANCE: CLEAR
APPEARANCE: CLEAR
AST SERPL-CCNC: 29 U/L
BACTERIA: NEGATIVE
BASOPHILS # BLD AUTO: 0.06 K/UL
BASOPHILS NFR BLD AUTO: 0.8 %
BILIRUB SERPL-MCNC: 0.7 MG/DL
BILIRUBIN URINE: NEGATIVE
BILIRUBIN URINE: NEGATIVE
BLOOD URINE: NEGATIVE
BLOOD URINE: NEGATIVE
BUN SERPL-MCNC: 35 MG/DL
CALCIUM SERPL-MCNC: 9.4 MG/DL
CHLORIDE SERPL-SCNC: 91 MMOL/L
CHOLEST SERPL-MCNC: 154 MG/DL
CHOLEST/HDLC SERPL: 4.2 RATIO
CO2 SERPL-SCNC: 32 MMOL/L
COLOR: COLORLESS
COLOR: COLORLESS
CREAT SERPL-MCNC: 1.63 MG/DL
DEPRECATED KAPPA LC FREE/LAMBDA SER: 1.24 RATIO
EOSINOPHIL # BLD AUTO: 0.19 K/UL
EOSINOPHIL NFR BLD AUTO: 2.6 %
ESTIMATED AVERAGE GLUCOSE: 134 MG/DL
FERRITIN SERPL-MCNC: 74 NG/ML
GLUCOSE QUALITATIVE U: NEGATIVE
GLUCOSE QUALITATIVE U: NEGATIVE
GLUCOSE SERPL-MCNC: 105 MG/DL
HAV IGM SER QL: NONREACTIVE
HBA1C MFR BLD HPLC: 6.3 %
HBV CORE IGG+IGM SER QL: NONREACTIVE
HBV CORE IGM SER QL: NONREACTIVE
HCT VFR BLD CALC: 38.2 %
HCV AB SER QL: NONREACTIVE
HCV S/CO RATIO: 0.15 S/CO
HDLC SERPL-MCNC: 37 MG/DL
HGB BLD-MCNC: 11.2 G/DL
HYALINE CASTS: 0 /LPF
IMM GRANULOCYTES NFR BLD AUTO: 0.3 %
INR PPP: 1.25 RATIO
IRON SATN MFR SERPL: 9 %
IRON SERPL-MCNC: 35 UG/DL
KAPPA LC CSF-MCNC: 5.53 MG/DL
KAPPA LC SERPL-MCNC: 6.84 MG/DL
KETONES URINE: NEGATIVE
KETONES URINE: NEGATIVE
LDLC SERPL CALC-MCNC: 95 MG/DL
LEUKOCYTE ESTERASE URINE: NEGATIVE
LEUKOCYTE ESTERASE URINE: NEGATIVE
LYMPHOCYTES # BLD AUTO: 0.93 K/UL
LYMPHOCYTES NFR BLD AUTO: 12.9 %
MAN DIFF?: NORMAL
MCHC RBC-ENTMCNC: 26.4 PG
MCHC RBC-ENTMCNC: 29.3 GM/DL
MCV RBC AUTO: 90.1 FL
MICROSCOPIC-UA: NORMAL
MONOCYTES # BLD AUTO: 0.52 K/UL
MONOCYTES NFR BLD AUTO: 7.2 %
NEUTROPHILS # BLD AUTO: 5.51 K/UL
NEUTROPHILS NFR BLD AUTO: 76.2 %
NITRITE URINE: NEGATIVE
NITRITE URINE: NEGATIVE
PH URINE: 7
PH URINE: 7
PLATELET # BLD AUTO: 252 K/UL
POTASSIUM SERPL-SCNC: 4.1 MMOL/L
PROT SERPL-MCNC: 6.7 G/DL
PROTEIN URINE: NEGATIVE
PROTEIN URINE: NEGATIVE
PT BLD: 14.7 SEC
RBC # BLD: 4.24 M/UL
RBC # FLD: 14.6 %
RED BLOOD CELLS URINE: 1 /HPF
SODIUM SERPL-SCNC: 135 MMOL/L
SPECIFIC GRAVITY URINE: 1.01
SPECIFIC GRAVITY URINE: 1.01
SQUAMOUS EPITHELIAL CELLS: 0 /HPF
TIBC SERPL-MCNC: 367 UG/DL
TRIGL SERPL-MCNC: 111 MG/DL
TSH SERPL-ACNC: 5.1 UIU/ML
UIBC SERPL-MCNC: 333 UG/DL
URATE SERPL-MCNC: 5.3 MG/DL
UROBILINOGEN URINE: NORMAL
UROBILINOGEN URINE: NORMAL
VIT B12 SERPL-MCNC: 486 PG/ML
WBC # FLD AUTO: 7.23 K/UL
WHITE BLOOD CELLS URINE: 0 /HPF

## 2020-09-25 ENCOUNTER — APPOINTMENT (OUTPATIENT)
Dept: WOUND CARE | Facility: CLINIC | Age: 80
End: 2020-09-25
Payer: MEDICARE

## 2020-09-25 VITALS — HEIGHT: 66 IN | WEIGHT: 186 LBS | BODY MASS INDEX: 29.89 KG/M2 | TEMPERATURE: 97.2 F

## 2020-09-25 VITALS — TEMPERATURE: 97.2 F

## 2020-09-25 PROCEDURE — 29581 APPL MULTLAYER CMPRN SYS LEG: CPT | Mod: LT

## 2020-09-27 NOTE — ASSESSMENT
[FreeTextEntry1] : Mr. NICOLLE COBB is a 80 year with persistent and worsening  right lower extremity venous insufficiency, CEAP classification C 6 which is  unresponsive to at least 3 months of compression stockings 20-30 mmHg, leg elevation, exercise and over the counter pain medication_(Ibuprofen).  Patient has complaints of worsening right  leg discomfort with swelling, fatigue, heaviness and ulcers.  The patient’s symptoms interfere with their ADL’s, such as walking, shopping and house work, and their ability to work and exercise. Patient has intact pulses in both legs without evidence of arterial insufficiency.  \par \par Treatment is indicated not for cosmetic reasons but for symptomatic venous reflux disease with symptoms which is refractory to conservative therapy. Venous duplex study demonstrates right  lower extremity venous insufficiency. The need for definitive effective treatment is based on severe, interfering and worsening reflux symptoms with evidence of venous insufficiency on venous ultrasound. \par \par Patient is a candidate for endovenous ablation treatment of the right  SSV. \par The risks and benefits of endovenous ablation treatment versus continued conservative management were discussed with the patient.  Patient chooses endovenous ablation treatments. Treatment plan to be scheduled. \par \par \par No clinical sign of infection\par Foam applied, then covered with velvet and multilevel compression\par Measurements\par Ankle- 30.5 cm\par Calf- 45 cm\par Length- 37 cm

## 2020-09-27 NOTE — PLAN
[FreeTextEntry1] : Plan:\par Apply lidocaine or topical anesthetic if needed to reduce pain upon washing the wound.\par Wash wound with ----0.9% saline or Dove skin sensitive soap and clean water \par Apply ----foam\par Apply ----multi-layer compression bandage or compression leg wrap\par Change dressing --- 3 times per week.\par Leg elevation as tolerated\par Encouraged ambulation or exercise.\par Optimization of nutrition.\par Offloading to the wound site.\par \par Wound supplies ordered via DME\par Patient given contact information to DME\par \par Homecare orders placed\par \par Follow up appointment scheduled for 1 week in office\par \par TeleHealth Services discussed with the patient and/or family.  Discharge instructions given including download of [facility] information regarding:\par 1)  [facility] to obtain medical records\par 2)  [facility] to conduct Face-to-Face TeleHealth visit\par 3)  [facility] [employer] for the Patient (patient takes a picture of their wound which is sent to the patient's chart for review)\par \par

## 2020-09-27 NOTE — PHYSICAL EXAM
[Normal Breath Sounds] : Normal breath sounds [2+] : left 2+ [Ankle Swelling (On Exam)] : present [Ankle Swelling Bilaterally] : bilaterally  [Ankle Swelling On The Left] : moderate [Skin Ulcer] : ulcer [Alert] : alert [Oriented to Person] : oriented to person [Oriented to Place] : oriented to place [Oriented to Time] : oriented to time [Calm] : calm [Please See PDF for Tissue Analytics] : Please See PDF for [employer]. [JVD] : no jugular venous distention  [Abdomen Tenderness] : ~T ~M No abdominal tenderness [Purpura] : no purpura  [Petechiae] : no petechiae [Skin Induration] : no induration [de-identified] : NAD, uses walker because of arthritis [de-identified] : AT [de-identified] : supple [de-identified] : soft

## 2020-09-27 NOTE — REVIEW OF SYSTEMS
[Limb Swelling] : limb swelling [Skin Wound] : skin wound [Negative] : Heme/Lymph [As Noted in HPI] : as noted in HPI [FreeTextEntry5] : Hx HTN [FreeTextEntry8] : Hx bladder cancer [FreeTextEntry9] : Hx Gout and Arthritis

## 2020-09-29 ENCOUNTER — APPOINTMENT (OUTPATIENT)
Dept: VASCULAR SURGERY | Facility: CLINIC | Age: 80
End: 2020-09-29

## 2020-10-05 ENCOUNTER — APPOINTMENT (OUTPATIENT)
Dept: VASCULAR SURGERY | Facility: CLINIC | Age: 80
End: 2020-10-05

## 2020-10-14 ENCOUNTER — APPOINTMENT (OUTPATIENT)
Dept: WOUND CARE | Facility: CLINIC | Age: 80
End: 2020-10-14

## 2020-10-16 RX ORDER — FUROSEMIDE 40 MG/1
40 TABLET ORAL
Qty: 180 | Refills: 3 | Status: DISCONTINUED | COMMUNITY
Start: 2019-08-09 | End: 2020-10-16

## 2020-10-16 RX ORDER — METOLAZONE 2.5 MG/1
2.5 TABLET ORAL
Qty: 5 | Refills: 0 | Status: DISCONTINUED | COMMUNITY
Start: 2020-08-24 | End: 2020-10-16

## 2020-10-24 ENCOUNTER — INPATIENT (INPATIENT)
Facility: HOSPITAL | Age: 80
LOS: 8 days | Discharge: NURSING, REHAB OR LONG-TERM CARE | DRG: 208 | End: 2020-11-02
Attending: INTERNAL MEDICINE | Admitting: SPECIALIST
Payer: MEDICARE

## 2020-10-24 VITALS
SYSTOLIC BLOOD PRESSURE: 125 MMHG | TEMPERATURE: 98 F | WEIGHT: 199.96 LBS | HEIGHT: 66 IN | RESPIRATION RATE: 20 BRPM | OXYGEN SATURATION: 94 % | HEART RATE: 85 BPM | DIASTOLIC BLOOD PRESSURE: 88 MMHG

## 2020-10-24 DIAGNOSIS — C67.9 MALIGNANT NEOPLASM OF BLADDER, UNSPECIFIED: Chronic | ICD-10-CM

## 2020-10-24 DIAGNOSIS — J96.90 RESPIRATORY FAILURE, UNSPECIFIED, UNSPECIFIED WHETHER WITH HYPOXIA OR HYPERCAPNIA: ICD-10-CM

## 2020-10-24 LAB
ALBUMIN SERPL ELPH-MCNC: 3.9 G/DL — SIGNIFICANT CHANGE UP (ref 3.3–5)
ALP SERPL-CCNC: 95 U/L — SIGNIFICANT CHANGE UP (ref 40–120)
ALT FLD-CCNC: 24 U/L — SIGNIFICANT CHANGE UP (ref 10–45)
ANION GAP SERPL CALC-SCNC: 14 MMOL/L — SIGNIFICANT CHANGE UP (ref 5–17)
APPEARANCE UR: CLEAR — SIGNIFICANT CHANGE UP
APTT BLD: 30.5 SEC — SIGNIFICANT CHANGE UP (ref 27.5–35.5)
AST SERPL-CCNC: 21 U/L — SIGNIFICANT CHANGE UP (ref 10–40)
BACTERIA # UR AUTO: NEGATIVE — SIGNIFICANT CHANGE UP
BASE EXCESS BLDV CALC-SCNC: 5.6 MMOL/L — HIGH (ref -2–2)
BASE EXCESS BLDV CALC-SCNC: 7.4 MMOL/L — HIGH (ref -2–2)
BASOPHILS # BLD AUTO: 0.01 K/UL — SIGNIFICANT CHANGE UP (ref 0–0.2)
BASOPHILS NFR BLD AUTO: 0.1 % — SIGNIFICANT CHANGE UP (ref 0–2)
BILIRUB SERPL-MCNC: 0.7 MG/DL — SIGNIFICANT CHANGE UP (ref 0.2–1.2)
BILIRUB UR-MCNC: NEGATIVE — SIGNIFICANT CHANGE UP
BUN SERPL-MCNC: 52 MG/DL — HIGH (ref 7–23)
CA-I SERPL-SCNC: 1.14 MMOL/L — SIGNIFICANT CHANGE UP (ref 1.12–1.3)
CA-I SERPL-SCNC: 1.16 MMOL/L — SIGNIFICANT CHANGE UP (ref 1.12–1.3)
CALCIUM SERPL-MCNC: 9.1 MG/DL — SIGNIFICANT CHANGE UP (ref 8.4–10.5)
CHLORIDE BLDV-SCNC: 103 MMOL/L — SIGNIFICANT CHANGE UP (ref 96–108)
CHLORIDE BLDV-SCNC: 103 MMOL/L — SIGNIFICANT CHANGE UP (ref 96–108)
CHLORIDE SERPL-SCNC: 99 MMOL/L — SIGNIFICANT CHANGE UP (ref 96–108)
CK MB BLD-MCNC: 5 % — HIGH (ref 0–3.5)
CK MB CFR SERPL CALC: 2.1 NG/ML — SIGNIFICANT CHANGE UP (ref 0–6.7)
CK SERPL-CCNC: 42 U/L — SIGNIFICANT CHANGE UP (ref 30–200)
CO2 BLDV-SCNC: 38 MMOL/L — HIGH (ref 22–30)
CO2 BLDV-SCNC: 39 MMOL/L — HIGH (ref 22–30)
CO2 SERPL-SCNC: 31 MMOL/L — SIGNIFICANT CHANGE UP (ref 22–31)
COLOR SPEC: YELLOW — SIGNIFICANT CHANGE UP
CREAT SERPL-MCNC: 2.04 MG/DL — HIGH (ref 0.5–1.3)
CRP SERPL-MCNC: 1.71 MG/DL — HIGH (ref 0–0.4)
D DIMER BLD IA.RAPID-MCNC: 359 NG/ML DDU — HIGH
DIFF PNL FLD: ABNORMAL
EOSINOPHIL # BLD AUTO: 0 K/UL — SIGNIFICANT CHANGE UP (ref 0–0.5)
EOSINOPHIL NFR BLD AUTO: 0 % — SIGNIFICANT CHANGE UP (ref 0–6)
EPI CELLS # UR: 1 /HPF — SIGNIFICANT CHANGE UP
FERRITIN SERPL-MCNC: 58 NG/ML — SIGNIFICANT CHANGE UP (ref 30–400)
GAS PNL BLDA: SIGNIFICANT CHANGE UP
GAS PNL BLDV: 141 MMOL/L — SIGNIFICANT CHANGE UP (ref 135–145)
GAS PNL BLDV: 142 MMOL/L — SIGNIFICANT CHANGE UP (ref 135–145)
GAS PNL BLDV: SIGNIFICANT CHANGE UP
GLUCOSE BLDV-MCNC: 152 MG/DL — HIGH (ref 70–99)
GLUCOSE BLDV-MCNC: 164 MG/DL — HIGH (ref 70–99)
GLUCOSE SERPL-MCNC: 164 MG/DL — HIGH (ref 70–99)
GLUCOSE UR QL: NEGATIVE — SIGNIFICANT CHANGE UP
HCO3 BLDV-SCNC: 36 MMOL/L — HIGH (ref 21–29)
HCO3 BLDV-SCNC: 37 MMOL/L — HIGH (ref 21–29)
HCT VFR BLD CALC: 34.8 % — LOW (ref 39–50)
HCT VFR BLD CALC: 37.8 % — LOW (ref 39–50)
HCT VFR BLDA CALC: 35 % — LOW (ref 39–50)
HCT VFR BLDA CALC: 36 % — LOW (ref 39–50)
HGB BLD CALC-MCNC: 11.3 G/DL — LOW (ref 13–17)
HGB BLD CALC-MCNC: 11.6 G/DL — LOW (ref 13–17)
HGB BLD-MCNC: 10.7 G/DL — LOW (ref 13–17)
HGB BLD-MCNC: 11.2 G/DL — LOW (ref 13–17)
HOROWITZ INDEX BLDV+IHG-RTO: SIGNIFICANT CHANGE UP
HYALINE CASTS # UR AUTO: 1 /LPF — SIGNIFICANT CHANGE UP (ref 0–2)
IMM GRANULOCYTES NFR BLD AUTO: 0.7 % — SIGNIFICANT CHANGE UP (ref 0–1.5)
INR BLD: 1.47 RATIO — HIGH (ref 0.88–1.16)
KETONES UR-MCNC: NEGATIVE — SIGNIFICANT CHANGE UP
LACTATE BLDV-MCNC: 2.1 MMOL/L — HIGH (ref 0.7–2)
LACTATE BLDV-MCNC: 2.1 MMOL/L — HIGH (ref 0.7–2)
LEUKOCYTE ESTERASE UR-ACNC: NEGATIVE — SIGNIFICANT CHANGE UP
LYMPHOCYTES # BLD AUTO: 0.36 K/UL — LOW (ref 1–3.3)
LYMPHOCYTES # BLD AUTO: 5.3 % — LOW (ref 13–44)
MCHC RBC-ENTMCNC: 25 PG — LOW (ref 27–34)
MCHC RBC-ENTMCNC: 25.1 PG — LOW (ref 27–34)
MCHC RBC-ENTMCNC: 29.6 GM/DL — LOW (ref 32–36)
MCHC RBC-ENTMCNC: 30.7 GM/DL — LOW (ref 32–36)
MCV RBC AUTO: 81.5 FL — SIGNIFICANT CHANGE UP (ref 80–100)
MCV RBC AUTO: 84.4 FL — SIGNIFICANT CHANGE UP (ref 80–100)
MONOCYTES # BLD AUTO: 0.23 K/UL — SIGNIFICANT CHANGE UP (ref 0–0.9)
MONOCYTES NFR BLD AUTO: 3.4 % — SIGNIFICANT CHANGE UP (ref 2–14)
NEUTROPHILS # BLD AUTO: 6.18 K/UL — SIGNIFICANT CHANGE UP (ref 1.8–7.4)
NEUTROPHILS NFR BLD AUTO: 90.5 % — HIGH (ref 43–77)
NITRITE UR-MCNC: NEGATIVE — SIGNIFICANT CHANGE UP
NRBC # BLD: 0 /100 WBCS — SIGNIFICANT CHANGE UP (ref 0–0)
NRBC # BLD: 0 /100 WBCS — SIGNIFICANT CHANGE UP (ref 0–0)
NT-PROBNP SERPL-SCNC: 5699 PG/ML — HIGH (ref 0–300)
OTHER CELLS CSF MANUAL: 6 ML/DL — LOW (ref 18–22)
PCO2 BLDV: 85 MMHG — HIGH (ref 35–50)
PCO2 BLDV: 90 MMHG — HIGH (ref 35–50)
PH BLDV: 7.22 — LOW (ref 7.35–7.45)
PH BLDV: 7.26 — LOW (ref 7.35–7.45)
PH UR: 6 — SIGNIFICANT CHANGE UP (ref 5–8)
PLATELET # BLD AUTO: 180 K/UL — SIGNIFICANT CHANGE UP (ref 150–400)
PLATELET # BLD AUTO: 196 K/UL — SIGNIFICANT CHANGE UP (ref 150–400)
PO2 BLDV: 26 MMHG — SIGNIFICANT CHANGE UP (ref 25–45)
PO2 BLDV: 38 MMHG — SIGNIFICANT CHANGE UP (ref 25–45)
POTASSIUM BLDV-SCNC: 3.5 MMOL/L — SIGNIFICANT CHANGE UP (ref 3.5–5.3)
POTASSIUM BLDV-SCNC: 3.5 MMOL/L — SIGNIFICANT CHANGE UP (ref 3.5–5.3)
POTASSIUM SERPL-MCNC: 3.5 MMOL/L — SIGNIFICANT CHANGE UP (ref 3.5–5.3)
POTASSIUM SERPL-SCNC: 3.5 MMOL/L — SIGNIFICANT CHANGE UP (ref 3.5–5.3)
PROCALCITONIN SERPL-MCNC: 0.15 NG/ML — HIGH (ref 0.02–0.1)
PROT SERPL-MCNC: 7.1 G/DL — SIGNIFICANT CHANGE UP (ref 6–8.3)
PROT UR-MCNC: ABNORMAL
PROTHROM AB SERPL-ACNC: 17.3 SEC — HIGH (ref 10.6–13.6)
RAPID RVP RESULT: SIGNIFICANT CHANGE UP
RBC # BLD: 4.27 M/UL — SIGNIFICANT CHANGE UP (ref 4.2–5.8)
RBC # BLD: 4.48 M/UL — SIGNIFICANT CHANGE UP (ref 4.2–5.8)
RBC # FLD: 16.1 % — HIGH (ref 10.3–14.5)
RBC # FLD: 16.3 % — HIGH (ref 10.3–14.5)
RBC CASTS # UR COMP ASSIST: 7 /HPF — HIGH (ref 0–4)
SAO2 % BLDV: 35 % — LOW (ref 67–88)
SAO2 % BLDV: 53 % — LOW (ref 67–88)
SARS-COV-2 RNA SPEC QL NAA+PROBE: SIGNIFICANT CHANGE UP
SODIUM SERPL-SCNC: 144 MMOL/L — SIGNIFICANT CHANGE UP (ref 135–145)
SP GR SPEC: 1.02 — SIGNIFICANT CHANGE UP (ref 1.01–1.02)
TROPONIN T, HIGH SENSITIVITY RESULT: 31 NG/L — SIGNIFICANT CHANGE UP (ref 0–51)
TROPONIN T, HIGH SENSITIVITY RESULT: 38 NG/L — SIGNIFICANT CHANGE UP (ref 0–51)
UROBILINOGEN FLD QL: NEGATIVE — SIGNIFICANT CHANGE UP
WBC # BLD: 5.43 K/UL — SIGNIFICANT CHANGE UP (ref 3.8–10.5)
WBC # BLD: 6.83 K/UL — SIGNIFICANT CHANGE UP (ref 3.8–10.5)
WBC # FLD AUTO: 5.43 K/UL — SIGNIFICANT CHANGE UP (ref 3.8–10.5)
WBC # FLD AUTO: 6.83 K/UL — SIGNIFICANT CHANGE UP (ref 3.8–10.5)
WBC UR QL: 2 /HPF — SIGNIFICANT CHANGE UP (ref 0–5)

## 2020-10-24 PROCEDURE — 71250 CT THORAX DX C-: CPT | Mod: 26

## 2020-10-24 PROCEDURE — 99291 CRITICAL CARE FIRST HOUR: CPT | Mod: 25

## 2020-10-24 PROCEDURE — 93308 TTE F-UP OR LMTD: CPT | Mod: 26

## 2020-10-24 PROCEDURE — 74176 CT ABD & PELVIS W/O CONTRAST: CPT | Mod: 26

## 2020-10-24 PROCEDURE — 31500 INSERT EMERGENCY AIRWAY: CPT | Mod: GC

## 2020-10-24 PROCEDURE — 76604 US EXAM CHEST: CPT | Mod: 26

## 2020-10-24 PROCEDURE — 70450 CT HEAD/BRAIN W/O DYE: CPT | Mod: 26

## 2020-10-24 PROCEDURE — 93010 ELECTROCARDIOGRAM REPORT: CPT | Mod: 59

## 2020-10-24 PROCEDURE — 71045 X-RAY EXAM CHEST 1 VIEW: CPT | Mod: 26

## 2020-10-24 PROCEDURE — 99291 CRITICAL CARE FIRST HOUR: CPT | Mod: CS,25,GC

## 2020-10-24 PROCEDURE — 72125 CT NECK SPINE W/O DYE: CPT | Mod: 26

## 2020-10-24 RX ORDER — CHLORHEXIDINE GLUCONATE 213 G/1000ML
15 SOLUTION TOPICAL EVERY 12 HOURS
Refills: 0 | Status: DISCONTINUED | OUTPATIENT
Start: 2020-10-24 | End: 2020-10-25

## 2020-10-24 RX ORDER — METOPROLOL TARTRATE 50 MG
5 TABLET ORAL EVERY 6 HOURS
Refills: 0 | Status: DISCONTINUED | OUTPATIENT
Start: 2020-10-24 | End: 2020-10-25

## 2020-10-24 RX ORDER — AZITHROMYCIN 500 MG/1
TABLET, FILM COATED ORAL
Refills: 0 | Status: DISCONTINUED | OUTPATIENT
Start: 2020-10-24 | End: 2020-10-26

## 2020-10-24 RX ORDER — HEPARIN SODIUM 5000 [USP'U]/ML
1600 INJECTION INTRAVENOUS; SUBCUTANEOUS
Qty: 25000 | Refills: 0 | Status: DISCONTINUED | OUTPATIENT
Start: 2020-10-24 | End: 2020-10-26

## 2020-10-24 RX ORDER — SUCCINYLCHOLINE CHLORIDE 100 MG/5ML
100 SYRINGE (ML) INTRAVENOUS ONCE
Refills: 0 | Status: COMPLETED | OUTPATIENT
Start: 2020-10-24 | End: 2020-10-24

## 2020-10-24 RX ORDER — AZITHROMYCIN 500 MG/1
500 TABLET, FILM COATED ORAL ONCE
Refills: 0 | Status: COMPLETED | OUTPATIENT
Start: 2020-10-24 | End: 2020-10-24

## 2020-10-24 RX ORDER — PROPOFOL 10 MG/ML
30 INJECTION, EMULSION INTRAVENOUS
Qty: 500 | Refills: 0 | Status: DISCONTINUED | OUTPATIENT
Start: 2020-10-24 | End: 2020-10-25

## 2020-10-24 RX ORDER — ETOMIDATE 2 MG/ML
30 INJECTION INTRAVENOUS ONCE
Refills: 0 | Status: COMPLETED | OUTPATIENT
Start: 2020-10-24 | End: 2020-10-24

## 2020-10-24 RX ORDER — ASPIRIN/CALCIUM CARB/MAGNESIUM 324 MG
1 TABLET ORAL
Qty: 0 | Refills: 0 | DISCHARGE

## 2020-10-24 RX ORDER — POTASSIUM CHLORIDE 20 MEQ
10 PACKET (EA) ORAL
Refills: 0 | Status: COMPLETED | OUTPATIENT
Start: 2020-10-24 | End: 2020-10-25

## 2020-10-24 RX ORDER — BUMETANIDE 0.25 MG/ML
2 INJECTION INTRAMUSCULAR; INTRAVENOUS ONCE
Refills: 0 | Status: COMPLETED | OUTPATIENT
Start: 2020-10-24 | End: 2020-10-24

## 2020-10-24 RX ORDER — BUMETANIDE 0.25 MG/ML
1 INJECTION INTRAMUSCULAR; INTRAVENOUS ONCE
Refills: 0 | Status: DISCONTINUED | OUTPATIENT
Start: 2020-10-24 | End: 2020-10-24

## 2020-10-24 RX ORDER — BUMETANIDE 0.25 MG/ML
1 INJECTION INTRAMUSCULAR; INTRAVENOUS ONCE
Refills: 0 | Status: COMPLETED | OUTPATIENT
Start: 2020-10-24 | End: 2020-10-24

## 2020-10-24 RX ORDER — CHLORHEXIDINE GLUCONATE 213 G/1000ML
1 SOLUTION TOPICAL
Refills: 0 | Status: DISCONTINUED | OUTPATIENT
Start: 2020-10-24 | End: 2020-10-26

## 2020-10-24 RX ORDER — AZITHROMYCIN 500 MG/1
500 TABLET, FILM COATED ORAL EVERY 24 HOURS
Refills: 0 | Status: DISCONTINUED | OUTPATIENT
Start: 2020-10-25 | End: 2020-10-26

## 2020-10-24 RX ORDER — POTASSIUM CHLORIDE 20 MEQ
40 PACKET (EA) ORAL ONCE
Refills: 0 | Status: COMPLETED | OUTPATIENT
Start: 2020-10-24 | End: 2020-10-24

## 2020-10-24 RX ORDER — PIPERACILLIN AND TAZOBACTAM 4; .5 G/20ML; G/20ML
3.38 INJECTION, POWDER, LYOPHILIZED, FOR SOLUTION INTRAVENOUS ONCE
Refills: 0 | Status: COMPLETED | OUTPATIENT
Start: 2020-10-24 | End: 2020-10-24

## 2020-10-24 RX ORDER — DEXMEDETOMIDINE HYDROCHLORIDE IN 0.9% SODIUM CHLORIDE 4 UG/ML
0.5 INJECTION INTRAVENOUS
Qty: 200 | Refills: 0 | Status: DISCONTINUED | OUTPATIENT
Start: 2020-10-24 | End: 2020-10-25

## 2020-10-24 RX ORDER — PIPERACILLIN AND TAZOBACTAM 4; .5 G/20ML; G/20ML
3.38 INJECTION, POWDER, LYOPHILIZED, FOR SOLUTION INTRAVENOUS EVERY 8 HOURS
Refills: 0 | Status: DISCONTINUED | OUTPATIENT
Start: 2020-10-24 | End: 2020-10-28

## 2020-10-24 RX ADMIN — AZITHROMYCIN 250 MILLIGRAM(S): 500 TABLET, FILM COATED ORAL at 19:25

## 2020-10-24 RX ADMIN — PIPERACILLIN AND TAZOBACTAM 200 GRAM(S): 4; .5 INJECTION, POWDER, LYOPHILIZED, FOR SOLUTION INTRAVENOUS at 19:24

## 2020-10-24 RX ADMIN — CHLORHEXIDINE GLUCONATE 15 MILLILITER(S): 213 SOLUTION TOPICAL at 19:24

## 2020-10-24 RX ADMIN — BUMETANIDE 2 MILLIGRAM(S): 0.25 INJECTION INTRAMUSCULAR; INTRAVENOUS at 20:01

## 2020-10-24 RX ADMIN — Medication 5 MILLIGRAM(S): at 23:46

## 2020-10-24 RX ADMIN — Medication 100 MILLIEQUIVALENT(S): at 23:56

## 2020-10-24 RX ADMIN — Medication 40 MILLIEQUIVALENT(S): at 23:55

## 2020-10-24 RX ADMIN — BUMETANIDE 1 MILLIGRAM(S): 0.25 INJECTION INTRAMUSCULAR; INTRAVENOUS at 13:38

## 2020-10-24 RX ADMIN — HEPARIN SODIUM 1600 UNIT(S)/HR: 5000 INJECTION INTRAVENOUS; SUBCUTANEOUS at 18:50

## 2020-10-24 RX ADMIN — PROPOFOL 16.3 MICROGRAM(S)/KG/MIN: 10 INJECTION, EMULSION INTRAVENOUS at 16:55

## 2020-10-24 RX ADMIN — ETOMIDATE 30 MILLIGRAM(S): 2 INJECTION INTRAVENOUS at 16:23

## 2020-10-24 RX ADMIN — DEXMEDETOMIDINE HYDROCHLORIDE IN 0.9% SODIUM CHLORIDE 11.1 MICROGRAM(S)/KG/HR: 4 INJECTION INTRAVENOUS at 20:02

## 2020-10-24 RX ADMIN — Medication 100 MILLIGRAM(S): at 16:24

## 2020-10-24 NOTE — H&P ADULT - NSHPPHYSICALEXAM_GEN_ALL_CORE
T(C): 35.8 (10-24-20 @ 18:03), Max: 36.8 (10-24-20 @ 12:17)  HR: 85 (10-24-20 @ 18:05) (80 - 98)  BP: 123/85 (10-24-20 @ 18:03) (122/86 - 151/108)  RR: 16 (10-24-20 @ 18:03) (16 - 24)  SpO2: 92% (10-24-20 @ 18:05) (90% - 100%)  Wt(kg): --Vital Signs Last 24 Hrs  T(C): 35.8 (24 Oct 2020 18:03), Max: 36.8 (24 Oct 2020 12:17)  T(F): 96.4 (24 Oct 2020 18:03), Max: 98.2 (24 Oct 2020 12:17)  HR: 85 (24 Oct 2020 18:05) (80 - 98)  BP: 123/85 (24 Oct 2020 18:03) (122/86 - 151/108)  BP(mean): 99 (24 Oct 2020 18:03) (99 - 99)  RR: 16 (24 Oct 2020 18:03) (16 - 24)  SpO2: 92% (24 Oct 2020 18:05) (90% - 100%)    PHYSICAL EXAM:  GENERAL: NAD, well-groomed, well-developed  HEAD:  Atraumatic, Normocephalic  EYES: EOMI, PERRLA, conjunctiva and sclera clear  ENMT: No tonsillar erythema, exudates, or enlargement; Moist mucous membranes  NECK: Supple, No JVD, Normal thyroid  CHEST/LUNG: Clear to auscultation bilaterally; No rales, rhonchi, wheezing, or rubs  HEART: Regular rate and rhythm; No murmurs, rubs, or gallops  ABDOMEN: Soft, Nontender, Nondistended; Bowel sounds present  NEURO: Alert & Oriented X3  EXTREMITIES: No LE edema, no calf tenderness

## 2020-10-24 NOTE — H&P ADULT - HISTORY OF PRESENT ILLNESS
79 yo M pmhx HTN, afib (on eliquis), PE, HLD, CKD II, CVA, bladder CA, PVD presenting with one day of confusion. Pt has been mainly homebound over the last several weeks related to chronic LE wounds, for which he was being seen by wound team. At that time he was also prescribed lasix due to LE edema, though was not taking it as prescribed, with wound nurses being concerned of fluid overload. Then patient was seemed to be confused yesterday with AOx1 and family decided to bring him in. Patient has had no sick contacts and had no fever, chills, cough. He did complain of some mild shortness of breath. No chest pain, abdominal pain or trouble urinating. It was noted that patient was refusing to take his lasix because he did not like getting up several times in the middle of the night to urinate. At baseline patient is AOx4 and per son takes care of himself relatively well. 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     In the ED the patient was noted to be confused and hypoxic. His VBG was notable for pCO2 80's, pH 7.26. ProBNP >500 and CT Chest abdomen pelvis with pulmonary edema and pleural effusions. Pt was intubated and sedated for hypoxic/hypercapnic respiratory failure.

## 2020-10-24 NOTE — ED PROVIDER NOTE - PROGRESS NOTE DETAILS
Further history obtained from patients son ([phone]).  Patient was acting normally yesterday, chronic complaints about hip pains and leg swelling.  Overnight wife noticed him to be confused, thinking he was driving.  Reportely he stood up and fall (having multiple falls recent), per family wife found him on knees, was able to get himself to bathroom and then back to his chair afterwards.  Son noted him this morning to have difficulty breathing and altered similar to presentation in Emergency Department.  No reported history of cardiac disease/A fib/stents.  Discussed code status with family given degree of illness, they had not had any prior discussions with patient and he had not expressed any wishes to them, son is requesting full code/intubation if needed and family will make further decisions based off clinical course. Chris Chavarria M.D.

## 2020-10-24 NOTE — H&P ADULT - ATTENDING COMMENTS
80M Hx Chronic A.Fib on ELQ, PE, Cerebellar CVA, Bladder CA, BPH, CKD2, PVD p/w Altered Mental Status, Hypoxic Hypercarbic Respiratory Failure, intubated now on Ventilator Support.  - Acute Hypoxic Hypercarbic Respiratory Failure on Vent Support and Sedation   - CXR, CT Chest and Bedside c/w Multifocal Pneumonia and Small Effusion   - Bedside POCUS done c/w Bibasilar Consolidation, Loculated Small Pleural Fluid Collection, low LV Contraction and IVC 2.2 cm   - [employer] ABx Coverage for Sever CAP pending C&S   - Suspected RV Overload with bedside POCUS to consider Preload Reduction and monitor I&O  - TTE, L. Ext Doppler Study  - GOC Plan discussed and updated with family over phone

## 2020-10-24 NOTE — ED ADULT NURSE NOTE - OBJECTIVE STATEMENT
79y/o male presented to the ED from home via EMS with complaint of AMS. A&Ox3. PMH- CVA, DM2, bladder CA, BPH, HLD, CHF HTN, A-fib. As per EMS patient was found by family to have AMS appx 1 hour ago, with labored breathing, called EMS. Family reports patient fell last night, unknown LOC, was found by patients wife on his knees. AS per EMS patient was 66% on room air upon arrival, cyanotic, labored / tachypneic breathing, A&OxO, unable to follow commands. EMS placed patient on 6L NC, SPo2 improved to 88%, mental status improved at that time. Upon arrival to [facility] patient A&Ox3, SPO2 88% on 6L NC. Patient lethargic in appearance, cyanotic coloring noted, eyes closed, responsive to verbal stimuli. Patient following commands. Labored Respirations noted. Crackles heard throughout all lung fields. B/L lower extremities wrapped in ACE dressing for wheeping edema. Patient states "im feeling good" at this time. Denies chest pain, SOB, fever, chills, N/V/D, abdominal pain. Patient unable to provide detailed history at this time. MD Chavarria spoke with patients family. Patient full code at this time. Patient on cardiac monitor, a-fib, EKG performed. WIll continue to monitor.

## 2020-10-24 NOTE — H&P ADULT - NSICDXPASTMEDICALHX_GEN_ALL_CORE_FT
PAST MEDICAL HISTORY:  Bladder cancer     BPH (Benign Prostatic Hypertrophy)     Cerebrovascular accident (CVA)     Diabetes mellitus     Gouty arthritis     Hyperlipidemia     Hypertension

## 2020-10-24 NOTE — ED PROVIDER NOTE - PSH
Bilateral Inguinal Hernia (BIH)    Bladder cancer     Dorsal Nasal Flap Text: The defect edges were debeveled with a #15 scalpel blade.  Given the location of the defect and the proximity to free margins a dorsal nasal flap was deemed most appropriate.  Using a sterile surgical marker, an appropriate dorsal nasal flap was drawn around the defect.    The area thus outlined was incised deep to adipose tissue with a #15 scalpel blade.  The skin margins were undermined to an appropriate distance in all directions utilizing iris scissors.

## 2020-10-24 NOTE — ED ADULT NURSE REASSESSMENT NOTE - NS ED NURSE REASSESS COMMENT FT1
patient arousable with sternal rub. After patient stimulated, patient A&Ox3. MD Chavarria at bedside.

## 2020-10-24 NOTE — CHART NOTE - NSCHARTNOTEFT_GEN_A_CORE
: Shana Christianson ACNP     INDICATION: Pleural effusion Respiratory failure     PROCEDURE:  [x ] LIMITED ECHO  [x ] LIMITED CHEST  [ ] LIMITED RETROPERITONEAL  [x ] LIMITED ABDOMINAL  [ ] LIMITED DVT  [ ] NEEDLE GUIDANCE VASCULAR  [ ] NEEDLE GUIDANCE THORACENTESIS  [ ] NEEDLE GUIDANCE PARACENTESIS  [ ] NEEDLE GUIDANCE PERICARDIOCENTESIS  [ ] OTHER    FINDINGS:  Echo   -RV enlargement LV also enlarged   -LV with reduced function   -LA and RA enlargement   - IVC 2.4 cm   - Trace pericardial effusion     Chest   - Predominant a lines to right chest wall with some focal B lines   - (+) Right Base consolidation with small effusion   - Predominant A lines to Left anterior Chest wall with some focal B lines   - Left Base with (+) consolidation and small effusion     Abd  -Gallbladder with Hyperechoic fluid no sludge or cholelithiasis noted   -Right Kidney (+) cyst x 2 noted   -unable to assess left kidney      INTERPRETATION:  RV enlargement ,LV with reduced function, IVC WNL   Bilateral base consolidations noted with trace effusions   right kidney cyst   Gallbladder WNL : Shana Christianson ACNP     INDICATION: Pleural effusion Respiratory failure     PROCEDURE:  [x ] LIMITED ECHO  [x ] LIMITED CHEST  [ ] LIMITED RETROPERITONEAL  [x ] LIMITED ABDOMINAL  [ ] LIMITED DVT  [ ] NEEDLE GUIDANCE VASCULAR  [ ] NEEDLE GUIDANCE THORACENTESIS  [ ] NEEDLE GUIDANCE PARACENTESIS  [ ] NEEDLE GUIDANCE PERICARDIOCENTESIS  [ ] OTHER    FINDINGS:  Echo   -RV enlargement LV also enlarged   -LV with reduced function   -LA and RA enlargement   - IVC 2.4 cm   - Trace pericardial effusion     Chest   - Predominant a lines to right chest wall with some focal B lines   - (+) Right Base consolidation with small effusion   - Predominant A lines to Left anterior Chest wall with some focal B lines   - Left Base with (+) consolidation and small effusion     Abd  -Gallbladder with Hyperechoic fluid no sludge or cholelithiasis noted   -Right Kidney (+) cyst x 2 noted   -unable to assess left kidney      INTERPRETATION:  RV enlargement ,LV with reduced function, IVC WNL   LA and RA also enlarged   Bilateral base consolidations noted with trace effusions   right kidney cyst   Gallbladder WNL

## 2020-10-24 NOTE — ED PROVIDER NOTE - CLINICAL SUMMARY MEDICAL DECISION MAKING FREE TEXT BOX
80yM HTN, Afib (eliquis) HLD presents due to AMS and hypoxic to 60s on RA. Patient currently maintaining airway following commands with supplemental oxygen given. Concern for metabolic vs infectious process. Will check blood work, get ekg, blood has, coags, CXR, continue to monitor respiratory status and reassess

## 2020-10-24 NOTE — ED PROVIDER NOTE - PHYSICAL EXAMINATION
Gen: AAOx2, tired appearing with increase work of breathing   Head: NCAT  HEENT: EOMI, oral mucosa moist, normal conjunctiva  Lung: mild crackles b/l speaking in full sentences  CV: RRR, no murmurs, rubs or gallops  Abd: soft, NTND, no guarding, no CVA tenderness  MSK: no visible deformities  Skin: Warm, well perfused, no rash  Psych: normal affect.   ~Gerard Colon M.D. Resident Gen: AAOx2, tired appearing with increase work of breathing   Head: NCAT  HEENT: EOMI, oral mucosa moist, normal conjunctiva  Lung: mild crackles b/l speaking in full sentences  CV: RRR, no murmurs, rubs or gallops  Abd: soft, NTND, no guarding, no CVA tenderness  MSK: no visible deformities  EXT: B/L wrapping of both LE  Skin: Warm, well perfused, no rash  Psych: normal affect.   ~Gerard Colon M.D. Resident

## 2020-10-24 NOTE — ED PROVIDER NOTE - OBJECTIVE STATEMENT
80yM HTN, Afib (eliquis) HLD presents due to AMS and hypoxia. Per EMS: Patient was found to have increase work of breathing and hypoxic sating in the 60s and appeared to be confused per family. Patient AAOx2. No fever, chest pain, abd pain, n/v.

## 2020-10-24 NOTE — ED ADULT NURSE NOTE - TEMPLATE
Health Maintenance Due   Topic Date Due   • IPV Vaccine (3 of 4 - 4-dose series) 08/24/2017   • DTaP/Tdap/Td Vaccine (3 - DTaP) 08/24/2017   • MMR Vaccine (1 of 2 - Standard series) 12/07/2017   • Varicella Vaccine (1 of 2 - 2-dose childhood series) 12/07/2017   • HIB Vaccine (3 of 3 - Standard series) 12/07/2017   • Hepatitis A Vaccine (1 of 2 - 2-dose series) 12/07/2017   • Pneumococcal Vaccine 0-64 (3 of 3) 12/07/2017       Patient is due for topics as listed above but is not proceeding with Immunization(s) Dtap/Tdap/Td, Hep A, HIB, IPV, MMR and Pneumococcal at this time. Will come back for a nurse visit in the future.           Respiratory

## 2020-10-24 NOTE — ED ADULT NURSE REASSESSMENT NOTE - NS ED NURSE REASSESS COMMENT FT1
Patient placed on bi-pap at this time. Tolorating well. Cardona catheter inserted for I&O monitor, using sterile technique, draining by gravity, secured with StatLock. Second RN present to confirm sterility.

## 2020-10-24 NOTE — PATIENT PROFILE ADULT - FALL HARM RISK
coagulation(Bleeding disorder R/T clinical cond/anti-coags)/unable to obtain, pt sedated and intubated no family at bedside

## 2020-10-24 NOTE — H&P ADULT - ASSESSMENT
81 yo M pmhx HTN, afib (on eliquis), PE, HLD, CKD II, CVA, bladder CA, PVD presenting with one day of confusion likely due to hypoxic/hypercapnic respiratory failure.    #Neuro  Intubated and sedated on propofol  - CTHead without any acute findings  - Hx of CVA with findings of R chronic cerebellar infarct  - will continue with anticoagulation for hx of strokes  - sedation vacations  - Confusion likely secondary to hypercapnia, will check serial ABG's and adjust ventilator    #Cardiovascular  Pt presenting with fluid overload, proBNP>5000 iso not taking home lasix.   - Volume overload on physical exam  - Hx of PE, may be some component of Right heart strain   - Monitor blood pressures; currently no need for pressors  - Bedside POCUS  -fu official TTE  - last TTE in 2019 with normal LV function, some mild pulmonary HTN  Afib  - continue with rate control metoprolol  - continue with AC as chadsvasc>2    #Pulmonary  Presenting with hypoxic/hypercapneic respiratory failure likely 2.2 overload, may have some component of pulmonary htn  - CT chest with evidence of pulmonary edema and b/l pleural effusions L>R  - Will diurese for goal net negative    #Renal  MERCY on CKD likely iso volume overload  - will check urine lytes  - will diurese for goal net negative  - avoid nephrotoxins  - Hx of bladder Ca, will obtain family collateral  - mccabe catheter in place  - no evidence of hydronephrosis or obstruction on Ct abdomen     #GI  Mild ascites on recent CT abdomen  - trend LFT's  - will feed patient    #ID  currently afebrile, no leukocytosis  - Has weeping chronic wounds, will monitor     #Endo  No current issues    #Heme  H/H at baseline, will continue to trend    Lines: peripheral  DVT: full dose AC for afib  diet: NPO with tube feeds   81 yo M pmhx HTN, afib (on eliquis), PE, HLD, CKD II, CVA, bladder CA, PVD presenting with one day of confusion likely due to hypoxic/hypercapnic respiratory failure.    #Neuro  Intubated and sedated on propofol  - CTHead without any acute findings  - Hx of CVA with findings of R chronic cerebellar infarct  - will continue with anticoagulation for hx of strokes  - sedation vacations  - Confusion likely secondary to hypercapnia, will check serial ABG's and adjust ventilator  - transition to precedex.     #Cardiovascular  Pt presenting with fluid overload, proBNP>5000 iso not taking home lasix.   - Volume overload on physical exam  - Hx of PE, may be some component of Right heart strain   - Monitor blood pressures; currently no need for pressors  - Bedside POCUS  -fu official TTE  - last TTE in 2019 with normal LV function, some mild pulmonary HTN  Afib  - continue with rate control IV lopressor for first 24 hours  - continue with AC as chadsvasc>2    #Pulmonary  Presenting with hypoxic/hypercapneic respiratory failure likely 2.2 overload, may have some component of pulmonary htn  - CT chest with evidence of pulmonary edema and b/l pleural effusions L>R  - Will diurese for goal net negative  -bibasilar consolidations as confirmed on CT and bedside POCUS Will cover with zosyn and azithromycin.   -     #Renal  MERCY on CKD likely iso volume overload  - will check urine lytes  - will diurese for goal net negative  - avoid nephrotoxins  - Hx of bladder Ca, will obtain family collateral  - mccabe catheter in place  - no evidence of hydronephrosis or obstruction on Ct abdomen     #GI  Mild ascites on recent CT abdomen  - trend LFT's  - will feed patient    #ID  currently afebrile, no leukocytosis  - Has weeping chronic wounds, will monitor   - treat for CAP with zosyn and azithromycin  - urine legionella  - mrsa swab    #Endo  No current issues    #Heme  H/H at baseline, will continue to trend  - need full dose AC for hx of cva, afib. Last dose of eliquis was oon 10/23    Lines: peripheral  DVT: full dose AC for afib  diet: NPO with tube feeds

## 2020-10-24 NOTE — PATIENT PROFILE ADULT - FUNCTIONAL SCREEN CURRENT LEVEL: SWALLOWING (IF SCORE 2 OR MORE FOR ANY ITEM, CONSULT REHAB SERVICES), MLM)
unable to obtain, pt sedated and intubated no family at bedside/2 = difficulty swallowing liquids/foods

## 2020-10-24 NOTE — ED PROVIDER NOTE - NS ED ROS FT
GENERAL: No fever or chills, EYES: no change in vision, HEENT: no trouble swallowing or speaking, CARDIAC: no chest pain, PULMONARY: no cough, +SOB, GI: no abdominal pain, no nausea, no vomiting, no diarrhea or constipation, : No changes in urination, SKIN: no rashes, NEURO: +AMS, no headache,  MSK: No joint pain ~Gerard Colon M.D. Resident

## 2020-10-24 NOTE — ED ADULT NURSE NOTE - NSIMPLEMENTINTERV_GEN_ALL_ED
Implemented All Fall with Harm Risk Interventions:  [place] to call system. Call bell, personal items and telephone within reach. Instruct patient to call for assistance. Room bathroom lighting operational. Non-slip footwear when patient is off stretcher. Physically safe environment: no spills, clutter or unnecessary equipment. Stretcher in lowest position, wheels locked, appropriate side rails in place. Provide visual cue, wrist band, yellow gown, etc. Monitor gait and stability. Monitor for mental status changes and reorient to person, place, and time. Review medications for side effects contributing to fall risk. Reinforce activity limits and safety measures with patient and family. Provide visual clues: red socks.

## 2020-10-24 NOTE — ED ADULT NURSE REASSESSMENT NOTE - NS ED NURSE REASSESS COMMENT FT1
Decision made by MD montoya to intubate patient at this time. Patient showed no improvement with BiPAP.     Patient intubated at 1624 with 100mg of succs and 30mg etomidate. Patient intubated with 7.5 tube. 24 at the lip, positive color change, b/l lung sounds. VSS.

## 2020-10-24 NOTE — H&P ADULT - NSHPLABSRESULTS_GEN_ALL_CORE
LABS:                        11.2   6.83  )-----------( 196      ( 24 Oct 2020 12:21 )             37.8     10    144  |  99  |  52<H>  ----------------------------<  164<H>  3.5   |  31  |  2.04<H>    Ca    9.1      24 Oct 2020 12:21    TPro  7.1  /  Alb  3.9  /  TBili  0.7  /  DBili  x   /  AST  21  /  ALT  24  /  AlkPhos  95  10-24    PT/INR - ( 24 Oct 2020 12:21 )   PT: 17.3 sec;   INR: 1.47 ratio         PTT - ( 24 Oct 2020 12:21 )  PTT:30.5 sec  Urinalysis Basic - ( 24 Oct 2020 13:07 )    Color: Yellow / Appearance: Clear / S.020 / pH: x  Gluc: x / Ketone: Negative  / Bili: Negative / Urobili: Negative   Blood: x / Protein: 300 mg/dL / Nitrite: Negative   Leuk Esterase: Negative / RBC: 7 /hpf / WBC 2 /HPF   Sq Epi: x / Non Sq Epi: 1 /hpf / Bacteria: Negative      CAPILLARY BLOOD GLUCOSE            Urinalysis Basic - ( 24 Oct 2020 13:07 )    Color: Yellow / Appearance: Clear / S.020 / pH: x  Gluc: x / Ketone: Negative  / Bili: Negative / Urobili: Negative   Blood: x / Protein: 300 mg/dL / Nitrite: Negative   Leuk Esterase: Negative / RBC: 7 /hpf / WBC 2 /HPF   Sq Epi: x / Non Sq Epi: 1 /hpf / Bacteria: Negative    c< from: CT Head No Cont (10.24.20 @ 16:43) >    IMPRESSION:    CT brain:  No acute intracranial hemorrhage, brain edema, or mass effect. No displaced calvarial fracture.    CT cervical spine:  Limited by motion.  No acute fracture or traumatic subluxation.  No prevertebral soft tissue swelling.  Degenerative changes.  Mild thickening of the interlobular septa, suggesting the presence of congestive heart failure    < end of copied text >    < from: CT Chest No Cont (10.24.20 @ 16:43) >    LIVER: Normal.  BILE DUCTS: Nondilated.  GALLBLADDER: Normal.  SPLEEN: Normal.  PANCREAS: Normal.  ADRENALS: Stable left adrenal thickening.  KIDNEYS/URETERS: No hydronephrosis or urinary tract calculi.    BLADDER: Collapsed around a Cardona catheter balloon.  REPRODUCTIVE ORGANS: Nonenlarged.    BOWEL: No bowel-related abnormality. Specifically, no evidence of acute diverticulitis. Normal appendix and ileocecal region. No bowel obstruction or bowel inflammation.  PERITONEUM: Small ascites. No free air.  VESSELS:  Aortoiliac atherosclerosis without aneurysm.  RETROPERITONEUM/LYMPH NODES: No adenopathy or hematoma.  ABDOMINAL WALL: No hematoma or contusion.  BONES: No acute skeletal trauma. No displaced rib fracture. No thoracolumbar spine compression fracture or traumatic malalignment.    IMPRESSION:    No acute traumatic injury.    Pulmonary edema and small bilateral pleural effusions. Small ascites.      < end of copied text >

## 2020-10-24 NOTE — ED PROVIDER NOTE - ATTENDING CONTRIBUTION TO CARE
Patient presenting BIBEMS for hypoxia/AMS.  Patient limited history.  Per EMS patient found to be hypoxic to 60's in field.  No known oxygen dependency at home.  Patient unable to give reliable history in Emergency Department as lethargic/hypoxic - on 6L NC satting low 80s on my exam, transitioned to NRB.    Unable to obtain ROS secondary to clinical condition    Exam:  General: Patient ill appearing, irregularly tachycardic, hypoxic as above  HEENT: airway patent with moist mucous membranes, gag reflex present, slight JVD on lying flat  Cardiac: irregularly tachycardia S1/S2 with strong peripheral pulses  Respiratory: rales at bilateral bases  GI: abdomen morbidly obese  Ext: bilateral pitting edema to abdomen, unna boots present bilaterally    Patient presenting with hypoxic respiratory failure, differential includes pulmonary edema, COVID 19, other respiratory infections, PE? - plan for labs, respiratory viral testing, CXR, TBA, will monitor respiratory status closely in Emergency Department.

## 2020-10-25 LAB
ANION GAP SERPL CALC-SCNC: 10 MMOL/L — SIGNIFICANT CHANGE UP (ref 5–17)
ANION GAP SERPL CALC-SCNC: 12 MMOL/L — SIGNIFICANT CHANGE UP (ref 5–17)
APTT BLD: 60.8 SEC — HIGH (ref 27.5–35.5)
APTT BLD: 76.2 SEC — HIGH (ref 27.5–35.5)
BUN SERPL-MCNC: 53 MG/DL — HIGH (ref 7–23)
BUN SERPL-MCNC: 53 MG/DL — HIGH (ref 7–23)
CALCIUM SERPL-MCNC: 8.7 MG/DL — SIGNIFICANT CHANGE UP (ref 8.4–10.5)
CALCIUM SERPL-MCNC: 8.9 MG/DL — SIGNIFICANT CHANGE UP (ref 8.4–10.5)
CHLORIDE SERPL-SCNC: 103 MMOL/L — SIGNIFICANT CHANGE UP (ref 96–108)
CHLORIDE SERPL-SCNC: 103 MMOL/L — SIGNIFICANT CHANGE UP (ref 96–108)
CO2 SERPL-SCNC: 29 MMOL/L — SIGNIFICANT CHANGE UP (ref 22–31)
CO2 SERPL-SCNC: 32 MMOL/L — HIGH (ref 22–31)
CORTIS AM PEAK SERPL-MCNC: 23.7 UG/DL — HIGH (ref 6–18.4)
CREAT SERPL-MCNC: 1.74 MG/DL — HIGH (ref 0.5–1.3)
CREAT SERPL-MCNC: 1.8 MG/DL — HIGH (ref 0.5–1.3)
CULTURE RESULTS: NO GROWTH — SIGNIFICANT CHANGE UP
GAS PNL BLDA: SIGNIFICANT CHANGE UP
GLUCOSE BLDC GLUCOMTR-MCNC: 134 MG/DL — HIGH (ref 70–99)
GLUCOSE SERPL-MCNC: 132 MG/DL — HIGH (ref 70–99)
GLUCOSE SERPL-MCNC: 140 MG/DL — HIGH (ref 70–99)
LEGIONELLA AG UR QL: NEGATIVE — SIGNIFICANT CHANGE UP
MAGNESIUM SERPL-MCNC: 1.8 MG/DL — SIGNIFICANT CHANGE UP (ref 1.6–2.6)
MAGNESIUM SERPL-MCNC: 1.8 MG/DL — SIGNIFICANT CHANGE UP (ref 1.6–2.6)
MRSA PCR RESULT.: SIGNIFICANT CHANGE UP
PHOSPHATE SERPL-MCNC: 3.2 MG/DL — SIGNIFICANT CHANGE UP (ref 2.5–4.5)
PHOSPHATE SERPL-MCNC: 3.6 MG/DL — SIGNIFICANT CHANGE UP (ref 2.5–4.5)
POTASSIUM SERPL-MCNC: 2.9 MMOL/L — CRITICAL LOW (ref 3.5–5.3)
POTASSIUM SERPL-MCNC: 3.6 MMOL/L — SIGNIFICANT CHANGE UP (ref 3.5–5.3)
POTASSIUM SERPL-SCNC: 2.9 MMOL/L — CRITICAL LOW (ref 3.5–5.3)
POTASSIUM SERPL-SCNC: 3.6 MMOL/L — SIGNIFICANT CHANGE UP (ref 3.5–5.3)
S AUREUS DNA NOSE QL NAA+PROBE: SIGNIFICANT CHANGE UP
SARS-COV-2 IGG SERPL QL IA: NEGATIVE — SIGNIFICANT CHANGE UP
SARS-COV-2 IGM SERPL IA-ACNC: <0.1 INDEX — SIGNIFICANT CHANGE UP
SODIUM SERPL-SCNC: 144 MMOL/L — SIGNIFICANT CHANGE UP (ref 135–145)
SODIUM SERPL-SCNC: 145 MMOL/L — SIGNIFICANT CHANGE UP (ref 135–145)
SPECIMEN SOURCE: SIGNIFICANT CHANGE UP
TROPONIN T, HIGH SENSITIVITY RESULT: 39 NG/L — SIGNIFICANT CHANGE UP (ref 0–51)
TSH SERPL-MCNC: 1.2 UIU/ML — SIGNIFICANT CHANGE UP (ref 0.27–4.2)

## 2020-10-25 PROCEDURE — 93306 TTE W/DOPPLER COMPLETE: CPT | Mod: 26

## 2020-10-25 PROCEDURE — 93970 EXTREMITY STUDY: CPT | Mod: 26

## 2020-10-25 PROCEDURE — 99291 CRITICAL CARE FIRST HOUR: CPT

## 2020-10-25 PROCEDURE — 76376 3D RENDER W/INTRP POSTPROCES: CPT | Mod: 26

## 2020-10-25 RX ORDER — METOPROLOL TARTRATE 50 MG
5 TABLET ORAL EVERY 6 HOURS
Refills: 0 | Status: COMPLETED | OUTPATIENT
Start: 2020-10-25 | End: 2020-10-26

## 2020-10-25 RX ORDER — METOPROLOL TARTRATE 50 MG
25 TABLET ORAL
Refills: 0 | Status: DISCONTINUED | OUTPATIENT
Start: 2020-10-25 | End: 2020-10-25

## 2020-10-25 RX ORDER — POTASSIUM CHLORIDE 20 MEQ
10 PACKET (EA) ORAL ONCE
Refills: 0 | Status: COMPLETED | OUTPATIENT
Start: 2020-10-25 | End: 2020-10-25

## 2020-10-25 RX ORDER — BUMETANIDE 0.25 MG/ML
2 INJECTION INTRAMUSCULAR; INTRAVENOUS ONCE
Refills: 0 | Status: COMPLETED | OUTPATIENT
Start: 2020-10-25 | End: 2020-10-25

## 2020-10-25 RX ADMIN — PIPERACILLIN AND TAZOBACTAM 25 GRAM(S): 4; .5 INJECTION, POWDER, LYOPHILIZED, FOR SOLUTION INTRAVENOUS at 20:00

## 2020-10-25 RX ADMIN — Medication 50 MILLIEQUIVALENT(S): at 18:08

## 2020-10-25 RX ADMIN — BUMETANIDE 2 MILLIGRAM(S): 0.25 INJECTION INTRAMUSCULAR; INTRAVENOUS at 11:54

## 2020-10-25 RX ADMIN — Medication 50 MILLIEQUIVALENT(S): at 18:57

## 2020-10-25 RX ADMIN — AZITHROMYCIN 250 MILLIGRAM(S): 500 TABLET, FILM COATED ORAL at 18:07

## 2020-10-25 RX ADMIN — CHLORHEXIDINE GLUCONATE 1 APPLICATION(S): 213 SOLUTION TOPICAL at 05:31

## 2020-10-25 RX ADMIN — Medication 100 MILLIEQUIVALENT(S): at 02:00

## 2020-10-25 RX ADMIN — HEPARIN SODIUM 1600 UNIT(S)/HR: 5000 INJECTION INTRAVENOUS; SUBCUTANEOUS at 00:56

## 2020-10-25 RX ADMIN — PIPERACILLIN AND TAZOBACTAM 25 GRAM(S): 4; .5 INJECTION, POWDER, LYOPHILIZED, FOR SOLUTION INTRAVENOUS at 04:17

## 2020-10-25 RX ADMIN — Medication 100 MILLIEQUIVALENT(S): at 00:59

## 2020-10-25 RX ADMIN — Medication 5 MILLIGRAM(S): at 11:49

## 2020-10-25 RX ADMIN — PIPERACILLIN AND TAZOBACTAM 25 GRAM(S): 4; .5 INJECTION, POWDER, LYOPHILIZED, FOR SOLUTION INTRAVENOUS at 11:49

## 2020-10-25 RX ADMIN — Medication 5 MILLIGRAM(S): at 23:13

## 2020-10-25 RX ADMIN — CHLORHEXIDINE GLUCONATE 15 MILLILITER(S): 213 SOLUTION TOPICAL at 05:31

## 2020-10-25 RX ADMIN — BUMETANIDE 2 MILLIGRAM(S): 0.25 INJECTION INTRAMUSCULAR; INTRAVENOUS at 21:38

## 2020-10-25 NOTE — PROGRESS NOTE ADULT - ATTENDING COMMENTS
81 yo M pmhx HTN, afib (on eliquis), PE, HLD, CKD II, CVA, bladder CA, p/w acute hypoxemic resp failure. Pt on POCUS with significant  B line pattern on  lung US and evidence of dense consolidations in base. Concern for both PNA and HF exacerbation. Pt on abx, f/u cx. Cont diuresis w/ bumex. Pt improving and on PS trial did well and extubated to bipap.

## 2020-10-25 NOTE — AIRWAY REMOVAL NOTE  ADULT & PEDS - ARTIFICAL AIRWAY REMOVAL COMMENTS
Written order for extubation verified. The patient was identified by full name and birth date compared to the identification band. Present during the procedure was DWIGHT Alberto.

## 2020-10-25 NOTE — PROGRESS NOTE ADULT - ASSESSMENT
81 yo M pmhx HTN, afib (on eliquis), PE, HLD, CKD II, CVA, bladder CA, PVD presenting with one day of confusion likely due to hypoxic/hypercapnic respiratory failure.    #Neuro  Intubated and sedated on propofol  - CTHead without any acute findings  - Hx of CVA with findings of R chronic cerebellar infarct  - will continue with anticoagulation for hx of strokes  - sedation vacations  - Confusion likely secondary to hypercapnia, will check serial ABG's and adjust ventilator  - On precedex: continue to wean as tolerated     #Cardiovascular  Pt presenting with fluid overload, proBNP>5000 iso not taking home lasix.   - Volume overload on physical exam  - Hx of PE, may be some component of Right heart strain   - Monitor blood pressures; currently no need for pressors  - Bedside POCUS  -fu official TTE  - last TTE in 2019 with normal LV function, some mild pulmonary HTN  Afib  - Metoprolol changed to PO   - continue with AC as chadsvasc>2    #Pulmonary  Presenting with hypoxic/hypercapneic respiratory failure likely 2.2 overload, may have some component of pulmonary htn  - CT chest with evidence of pulmonary edema and b/l pleural effusions L>R  - Will diurese for goal net negative  -bibasilar consolidations as confirmed on CT and bedside POCUS Will cover with zosyn and azithromycin  -SBT with plan to extubate     #Renal  MERCY on CKD likely iso volume overload  - will check urine lytes  - will diurese for goal net negative  - avoid nephrotoxins  - Hx of bladder Ca, will obtain family collateral  - mccabe catheter in place  - no evidence of hydronephrosis or obstruction on Ct abdomen     #GI  Mild ascites on recent CT abdomen  - trend LFT's  - will feed patient    #ID  currently afebrile, no leukocytosis  - Has weeping chronic wounds, will monitor   - treat for CAP with zosyn and azithromycin  - urine legionella  - mrsa swab    #Endo  No current issues    #Heme  H/H at baseline, will continue to trend  - need full dose AC for hx of cva, afib. Last dose of eliquis was oon 10/23    Lines: peripheral  DVT: full dose AC for afib  diet: NPO with tube feeds

## 2020-10-25 NOTE — PROGRESS NOTE ADULT - SUBJECTIVE AND OBJECTIVE BOX
Patient is a 80y old  Male who presents with a chief complaint of Hypoxic Respiratory Failure (24 Oct 2020 18:02)      24 hour events: ***    REVIEW OF SYSTEMS:  Constitutional: [ ] fevers [ ] chills [ ] weight loss [ ] weight gain  HEENT: [ ] dry eyes [ ] eye irritation [ ] postnasal drip [ ] nasal congestion  CV: [ ] chest pain [ ] orthopnea [ ] palpitations [ ] murmur  Resp: [ ] cough [ ] shortness of breath [ ] dyspnea [ ] wheezing [ ] sputum [ ] hemoptysis  GI: [ ] nausea [ ] vomiting [ ] diarrhea [ ] constipation [ ] abd pain [ ] dysphagia   : [ ] dysuria [ ] nocturia [ ] hematuria [ ] increased urinary frequency  Musculoskeletal: [ ] back pain [ ] myalgias [ ] arthralgias [ ] fracture  Skin: [ ] rash [ ] itch  Neurological: [ ] headache [ ] dizziness [ ] syncope [ ] weakness [ ] numbness  Psychiatric: [ ] anxiety [ ] depression  Endocrine: [ ] diabetes [ ] thyroid problem  Hematologic/Lymphatic: [ ] anemia [ ] bleeding problem  Allergic/Immunologic: [ ] itchy eyes [ ] nasal discharge [ ] hives [ ] angioedema  [ ] All other systems negative  [ ] Unable to assess ROS because ________    OBJECTIVE:  ICU Vital Signs Last 24 Hrs  T(C): 36.3 (25 Oct 2020 07:00), Max: 36.8 (24 Oct 2020 12:17)  T(F): 97.3 (25 Oct 2020 07:00), Max: 98.2 (24 Oct 2020 12:17)  HR: 77 (25 Oct 2020 08:19) (72 - 98)  BP: 118/76 (25 Oct 2020 08:00) (110/76 - 155/89)  BP(mean): 92 (25 Oct 2020 08:00) (84 - 116)  ABP: --  ABP(mean): --  RR: 12 (25 Oct 2020 08:00) (12 - 24)  SpO2: 100% (25 Oct 2020 08:19) (90% - 100%)    Mode: AC/ CMV (Assist Control/ Continuous Mandatory Ventilation), RR (machine): 12, TV (machine): 400, FiO2: 30, PEEP: 5, ITime: 0.63, MAP: 8, PIP: 22    10- @ 07:01  -  10-25 @ 07:00  --------------------------------------------------------  IN: 1504.1 mL / OUT: 1565 mL / NET: -60.9 mL    10-25 @ 07:01  -  10-25 @ 09:40  --------------------------------------------------------  IN: 29.4 mL / OUT: 20 mL / NET: 9.4 mL      CAPILLARY BLOOD GLUCOSE      POCT Blood Glucose.: 134 mg/dL (25 Oct 2020 06:02)      PHYSICAL EXAM:  GENERAL: NAD, well-developed  HEAD:  Atraumatic, Normocephalic  EYES: EOMI, PERRLA, conjunctiva and sclera clear  NECK: Supple, No JVD, Thyroid not palpable, non tender, Trachea midline  CHEST/LUNG: ( )ETT in place, ( )Tracheostomy in place, ( )no chest deformity,  ( )  Normal expansion/effort/palpation,  ( )Normal percussion/auscultation,  Clear to auscultation bilaterally; No wheeze  HEART: Regular rate and rhythm; No murmurs, rubs, or gallops, ( ) No JVD, ( )Normal Pulses, ( )Edema   ABDOMEN: Soft, Nontender, Nondistended; Bowel sounds presen, ( ) No Masses, (  ) No organomegaly,  (  ) Non-tender normal BS   : Saleh in Place, Voiding freely  Musculoskeletal/EXTREMITIES:(  ) Normal strength, movement, and tone, (  ) No focal atropy, (  ) Normal ROM, (  ) Normal digits and nails,  2+ Peripheral Pulses, No clubbing, cyanosis, or edema  PSYCH: AAOx3, (  ) Normal mood/affect/judgment/insight, (  ) Intact memory,   NEUROLOGY: non-focal, exam  SKIN: No rashes or lesions      LINES:    HOSPITAL MEDICATIONS:  MEDICATIONS  (STANDING):  azithromycin  IVPB      azithromycin  IVPB 500 milliGRAM(s) IV Intermittent every 24 hours  chlorhexidine 0.12% Liquid 15 milliLiter(s) Oral Mucosa every 12 hours  chlorhexidine 4% Liquid 1 Application(s) Topical <User Schedule>  dexMEDEtomidine Infusion 0.5 MICROgram(s)/kG/Hr (11.1 mL/Hr) IV Continuous <Continuous>  heparin  Infusion. 1600 Unit(s)/Hr (16 mL/Hr) IV Continuous <Continuous>  metoprolol tartrate Injectable 5 milliGRAM(s) IV Push every 6 hours  piperacillin/tazobactam IVPB.. 3.375 Gram(s) IV Intermittent every 8 hours  propofol Infusion. 30 MICROgram(s)/kG/Min (16.3 mL/Hr) IV Continuous <Continuous>    MEDICATIONS  (PRN):      LABS:                        10.7   5.43  )-----------( 180      ( 24 Oct 2020 23:46 )             34.8     Hgb Trend: 10.7<--, 11.2<--  10-24    144  |  103  |  53<H>  ----------------------------<  140<H>  2.9<LL>   |  29  |  1.74<H>    Ca    8.9      24 Oct 2020 23:46  Phos  3.2     10-24  Mg     1.8     10-    TPro  7.1  /  Alb  3.9  /  TBili  0.7  /  DBili  x   /  AST  21  /  ALT  24  /  AlkPhos  95  10-24    Creatinine Trend: 1.74<--, 2.04<--  PT/INR - ( 24 Oct 2020 12:21 )   PT: 17.3 sec;   INR: 1.47 ratio         PTT - ( 25 Oct 2020 06:51 )  PTT:76.2 sec  Urinalysis Basic - ( 24 Oct 2020 13:07 )    Color: Yellow / Appearance: Clear / S.020 / pH: x  Gluc: x / Ketone: Negative  / Bili: Negative / Urobili: Negative   Blood: x / Protein: 300 mg/dL / Nitrite: Negative   Leuk Esterase: Negative / RBC: 7 /hpf / WBC 2 /HPF   Sq Epi: x / Non Sq Epi: 1 /hpf / Bacteria: Negative      Arterial Blood Gas:  10-25 @ 03:05  7.45/52/78/35/96/10.2  ABG lactate: --  Arterial Blood Gas:  10-25 @ 01:14  7.52/40/124/32/99/8.9  ABG lactate: --  Arterial Blood Gas:  10-24 @ 23:43  7.59/34/90/33/99/10.7  ABG lactate: --    Venous Blood Gas:  10-24 @ 15:21  7.26/85/26/37/35  VBG Lactate: 2.1  Venous Blood Gas:  10-24 @ 12:21  7.22/90/38/36/53  VBG Lactate: 2.1      EKG:    MICROBIOLOGY:     RADIOLOGY:  [ ] Reviewed and interpreted by me    EKG:  MICROBIOLOGY:     Radiology: ***    Bedside lung ultrasound: ***    Bedside ECHO: ***    EKG:    CENTRAL LINE: Y/N          DATE INSERTED:              REMOVE: Y/N    SALEH: Y/N                        DATE INSERTED:              REMOVE: Y/N    A-LINE: Y/N                       DATE INSERTED:              REMOVE: Y/N    GLOBAL ISSUE/BEST PRACTICE:  Analgesia:  Sedation:  HOB elevation: yes  Stress ulcer prophylaxis:  VTE prophylaxis:  Glycemic control:  Nutrition:    CODE STATUS: ***  GO discussion: Y     Patient is a 80y old  Male who presents with a chief complaint of Hypoxic Respiratory Failure (24 Oct 2020 18:02)    24 hour events: No overnight events     REVIEW OF SYSTEMS:  Constitutional: [ ] fevers [ ] chills [ ] weight loss [ ] weight gain  HEENT: [ ] dry eyes [ ] eye irritation [ ] postnasal drip [ ] nasal congestion  CV: [ ] chest pain [ ] orthopnea [ ] palpitations [ ] murmur  Resp: [ ] cough [ ] shortness of breath [ ] dyspnea [ ] wheezing [ ] sputum [ ] hemoptysis  GI: [ ] nausea [ ] vomiting [ ] diarrhea [ ] constipation [ ] abd pain [ ] dysphagia   : [ ] dysuria [ ] nocturia [ ] hematuria [ ] increased urinary frequency  Musculoskeletal: [ ] back pain [ ] myalgias [ ] arthralgias [ ] fracture  Skin: [ ] rash [ ] itch  Neurological: [ ] headache [ ] dizziness [ ] syncope [ ] weakness [ ] numbness  Psychiatric: [ ] anxiety [ ] depression  Endocrine: [ ] diabetes [ ] thyroid problem  Hematologic/Lymphatic: [ ] anemia [ ] bleeding problem  Allergic/Immunologic: [ ] itchy eyes [ ] nasal discharge [ ] hives [ ] angioedema  [x] All other systems negative  [ ] Unable to assess ROS because ________    OBJECTIVE:  ICU Vital Signs Last 24 Hrs  T(C): 36.3 (25 Oct 2020 07:00), Max: 36.8 (24 Oct 2020 12:17)  T(F): 97.3 (25 Oct 2020 07:00), Max: 98.2 (24 Oct 2020 12:17)  HR: 77 (25 Oct 2020 08:19) (72 - 98)  BP: 118/76 (25 Oct 2020 08:00) (110/76 - 155/89)  BP(mean): 92 (25 Oct 2020 08:00) (84 - 116)  ABP: --  ABP(mean): --  RR: 12 (25 Oct 2020 08:00) (12 - 24)  SpO2: 100% (25 Oct 2020 08:19) (90% - 100%)    Mode: AC/ CMV (Assist Control/ Continuous Mandatory Ventilation), RR (machine): 12, TV (machine): 400, FiO2: 30, PEEP: 5, ITime: 0.63, MAP: 8, PIP: 22    10-24 @ 07:01  -  10-25 @ 07:00  --------------------------------------------------------  IN: 1504.1 mL / OUT: 1565 mL / NET: -60.9 mL    10-25 @ 07:01  -  10-25 @ 09:40  --------------------------------------------------------  IN: 29.4 mL / OUT: 20 mL / NET: 9.4 mL      CAPILLARY BLOOD GLUCOSE      POCT Blood Glucose.: 134 mg/dL (25 Oct 2020 06:02)      PHYSICAL EXAM:  GENERAL: NAD, well-groomed, well-developed  HEAD:  Atraumatic, Normocephalic  EYES: EOMI, PERRLA, conjunctiva and sclera clear  ENMT: No tonsillar erythema, exudates, or enlargement; Moist mucous membranes  NECK: Supple, No JVD, Normal thyroid  CHEST/LUNG: Clear to auscultation bilaterally; No rales, rhonchi, wheezing, or rubs  HEART: Regular rate and rhythm; No murmurs, rubs, or gallops  ABDOMEN: Soft, Nontender, Nondistended; Bowel sounds present  NEURO: Alert & Oriented X3  EXTREMITIES: B/l pitting LE edema, no calf tenderness      HOSPITAL MEDICATIONS:  MEDICATIONS  (STANDING):  azithromycin  IVPB      azithromycin  IVPB 500 milliGRAM(s) IV Intermittent every 24 hours  chlorhexidine 0.12% Liquid 15 milliLiter(s) Oral Mucosa every 12 hours  chlorhexidine 4% Liquid 1 Application(s) Topical <User Schedule>  dexMEDEtomidine Infusion 0.5 MICROgram(s)/kG/Hr (11.1 mL/Hr) IV Continuous <Continuous>  heparin  Infusion. 1600 Unit(s)/Hr (16 mL/Hr) IV Continuous <Continuous>  metoprolol tartrate Injectable 5 milliGRAM(s) IV Push every 6 hours  piperacillin/tazobactam IVPB.. 3.375 Gram(s) IV Intermittent every 8 hours  propofol Infusion. 30 MICROgram(s)/kG/Min (16.3 mL/Hr) IV Continuous <Continuous>    MEDICATIONS  (PRN):      LABS:                        10.7   5.43  )-----------( 180      ( 24 Oct 2020 23:46 )             34.8     Hgb Trend: 10.7<--, 11.2<--  10-24    144  |  103  |  53<H>  ----------------------------<  140<H>  2.9<LL>   |  29  |  1.74<H>    Ca    8.9      24 Oct 2020 23:46  Phos  3.2     10-24  Mg     1.8     10-24    TPro  7.1  /  Alb  3.9  /  TBili  0.7  /  DBili  x   /  AST  21  /  ALT  24  /  AlkPhos  95  10-24    Creatinine Trend: 1.74<--, 2.04<--  PT/INR - ( 24 Oct 2020 12:21 )   PT: 17.3 sec;   INR: 1.47 ratio         PTT - ( 25 Oct 2020 06:51 )  PTT:76.2 sec  Urinalysis Basic - ( 24 Oct 2020 13:07 )    Color: Yellow / Appearance: Clear / S.020 / pH: x  Gluc: x / Ketone: Negative  / Bili: Negative / Urobili: Negative   Blood: x / Protein: 300 mg/dL / Nitrite: Negative   Leuk Esterase: Negative / RBC: 7 /hpf / WBC 2 /HPF   Sq Epi: x / Non Sq Epi: 1 /hpf / Bacteria: Negative      Arterial Blood Gas:  10-25 @ 03:05  7.45/52/78/35/96/10.2  ABG lactate: --  Arterial Blood Gas:  10-25 @ 01:14  7.52/40/124/32/99/8.9  ABG lactate: --  Arterial Blood Gas:  10-24 @ 23:43  7.59/34/90/33/99/10.7  ABG lactate: --    Venous Blood Gas:  10-24 @ 15:21  7.26/85/26/37/35  VBG Lactate: 2.1  Venous Blood Gas:  10-24 @ 12:21  7.22/90/38/36/53  VBG Lactate: 2.1

## 2020-10-26 DIAGNOSIS — J96.90 RESPIRATORY FAILURE, UNSPECIFIED, UNSPECIFIED WHETHER WITH HYPOXIA OR HYPERCAPNIA: ICD-10-CM

## 2020-10-26 DIAGNOSIS — E78.5 HYPERLIPIDEMIA, UNSPECIFIED: ICD-10-CM

## 2020-10-26 DIAGNOSIS — Z29.9 ENCOUNTER FOR PROPHYLACTIC MEASURES, UNSPECIFIED: ICD-10-CM

## 2020-10-26 DIAGNOSIS — I10 ESSENTIAL (PRIMARY) HYPERTENSION: ICD-10-CM

## 2020-10-26 DIAGNOSIS — I48.91 UNSPECIFIED ATRIAL FIBRILLATION: ICD-10-CM

## 2020-10-26 DIAGNOSIS — I50.20 UNSPECIFIED SYSTOLIC (CONGESTIVE) HEART FAILURE: ICD-10-CM

## 2020-10-26 DIAGNOSIS — Z02.9 ENCOUNTER FOR ADMINISTRATIVE EXAMINATIONS, UNSPECIFIED: ICD-10-CM

## 2020-10-26 DIAGNOSIS — C67.9 MALIGNANT NEOPLASM OF BLADDER, UNSPECIFIED: ICD-10-CM

## 2020-10-26 LAB
ANION GAP SERPL CALC-SCNC: 9 MMOL/L — SIGNIFICANT CHANGE UP (ref 5–17)
APTT BLD: 93.5 SEC — HIGH (ref 27.5–35.5)
BUN SERPL-MCNC: 52 MG/DL — HIGH (ref 7–23)
CALCIUM SERPL-MCNC: 8.4 MG/DL — SIGNIFICANT CHANGE UP (ref 8.4–10.5)
CHLORIDE SERPL-SCNC: 103 MMOL/L — SIGNIFICANT CHANGE UP (ref 96–108)
CO2 SERPL-SCNC: 33 MMOL/L — HIGH (ref 22–31)
CREAT SERPL-MCNC: 1.89 MG/DL — HIGH (ref 0.5–1.3)
GAS PNL BLDA: SIGNIFICANT CHANGE UP
GLUCOSE SERPL-MCNC: 127 MG/DL — HIGH (ref 70–99)
HCT VFR BLD CALC: 35 % — LOW (ref 39–50)
HGB BLD-MCNC: 10.7 G/DL — LOW (ref 13–17)
INR BLD: 1.43 RATIO — HIGH (ref 0.88–1.16)
MAGNESIUM SERPL-MCNC: 1.8 MG/DL — SIGNIFICANT CHANGE UP (ref 1.6–2.6)
MCHC RBC-ENTMCNC: 24.9 PG — LOW (ref 27–34)
MCHC RBC-ENTMCNC: 30.6 GM/DL — LOW (ref 32–36)
MCV RBC AUTO: 81.4 FL — SIGNIFICANT CHANGE UP (ref 80–100)
NRBC # BLD: 0 /100 WBCS — SIGNIFICANT CHANGE UP (ref 0–0)
PHOSPHATE SERPL-MCNC: 3.8 MG/DL — SIGNIFICANT CHANGE UP (ref 2.5–4.5)
PLATELET # BLD AUTO: 198 K/UL — SIGNIFICANT CHANGE UP (ref 150–400)
POTASSIUM SERPL-MCNC: 3.5 MMOL/L — SIGNIFICANT CHANGE UP (ref 3.5–5.3)
POTASSIUM SERPL-SCNC: 3.5 MMOL/L — SIGNIFICANT CHANGE UP (ref 3.5–5.3)
PROTHROM AB SERPL-ACNC: 16.9 SEC — HIGH (ref 10.6–13.6)
RBC # BLD: 4.3 M/UL — SIGNIFICANT CHANGE UP (ref 4.2–5.8)
RBC # FLD: 16.5 % — HIGH (ref 10.3–14.5)
SODIUM SERPL-SCNC: 145 MMOL/L — SIGNIFICANT CHANGE UP (ref 135–145)
WBC # BLD: 7.4 K/UL — SIGNIFICANT CHANGE UP (ref 3.8–10.5)
WBC # FLD AUTO: 7.4 K/UL — SIGNIFICANT CHANGE UP (ref 3.8–10.5)

## 2020-10-26 PROCEDURE — 99233 SBSQ HOSP IP/OBS HIGH 50: CPT | Mod: GC

## 2020-10-26 RX ORDER — BUMETANIDE 0.25 MG/ML
2 INJECTION INTRAMUSCULAR; INTRAVENOUS EVERY 12 HOURS
Refills: 0 | Status: DISCONTINUED | OUTPATIENT
Start: 2020-10-26 | End: 2020-10-27

## 2020-10-26 RX ORDER — METOPROLOL TARTRATE 50 MG
25 TABLET ORAL
Refills: 0 | Status: DISCONTINUED | OUTPATIENT
Start: 2020-10-27 | End: 2020-10-29

## 2020-10-26 RX ORDER — APIXABAN 2.5 MG/1
2.5 TABLET, FILM COATED ORAL
Refills: 0 | Status: DISCONTINUED | OUTPATIENT
Start: 2020-10-26 | End: 2020-10-28

## 2020-10-26 RX ADMIN — PIPERACILLIN AND TAZOBACTAM 25 GRAM(S): 4; .5 INJECTION, POWDER, LYOPHILIZED, FOR SOLUTION INTRAVENOUS at 12:12

## 2020-10-26 RX ADMIN — Medication 5 MILLIGRAM(S): at 18:26

## 2020-10-26 RX ADMIN — HEPARIN SODIUM 1600 UNIT(S)/HR: 5000 INJECTION INTRAVENOUS; SUBCUTANEOUS at 00:54

## 2020-10-26 RX ADMIN — Medication 5 MILLIGRAM(S): at 11:29

## 2020-10-26 RX ADMIN — Medication 5 MILLIGRAM(S): at 05:08

## 2020-10-26 RX ADMIN — PIPERACILLIN AND TAZOBACTAM 25 GRAM(S): 4; .5 INJECTION, POWDER, LYOPHILIZED, FOR SOLUTION INTRAVENOUS at 04:04

## 2020-10-26 RX ADMIN — BUMETANIDE 2 MILLIGRAM(S): 0.25 INJECTION INTRAMUSCULAR; INTRAVENOUS at 18:29

## 2020-10-26 RX ADMIN — PIPERACILLIN AND TAZOBACTAM 25 GRAM(S): 4; .5 INJECTION, POWDER, LYOPHILIZED, FOR SOLUTION INTRAVENOUS at 23:25

## 2020-10-26 RX ADMIN — APIXABAN 2.5 MILLIGRAM(S): 2.5 TABLET, FILM COATED ORAL at 23:24

## 2020-10-26 RX ADMIN — Medication 5 MILLIGRAM(S): at 23:24

## 2020-10-26 NOTE — PROGRESS NOTE ADULT - PROBLEM SELECTOR PLAN 7
Dispo - pending management of new hypoxia and treatment for CAP Dispo - pending management of new hypoxia and treatment for CAP    PCP - Catalino Lara  Cardiology - Pt reports outside hospital cardiologist, unable to recall name  Vascular - Dr. [place]

## 2020-10-26 NOTE — SWALLOW BEDSIDE ASSESSMENT ADULT - SLP GENERAL OBSERVATIONS
Ax04. NAD. NC 2L. Hoarse vocal quality. He was cooperative and pleasant throughout, indicated that he will do 'whatever I need to do" as he is "in [facility]".

## 2020-10-26 NOTE — PHYSICAL THERAPY INITIAL EVALUATION ADULT - MODALITIES TREATMENT COMMENTS
PT WC order received for dynaflex dressing application to b/l/ LEs , edilia session well, wound received C/D/I, left ante shin measured: 3.0cmx3.0cmx0.1cm, Left poste. calf measured: 3.5cmx1.0cmx0.1cm, no erythema, no purulent, no malodor, cleansed with NS, cavilon to periwound, adaptic touch to cover the wounds, followed by aquacel sheet, appied lotion to b/l LEs, then cotton layer with 50% overlap in a spiral pattern, followed by ACE wrap with 50% overlap in a figure 8 pattern, followed by coban layer with 50% overlap in a spiral patter

## 2020-10-26 NOTE — PROGRESS NOTE ADULT - PROBLEM SELECTOR PLAN 1
Possibly secondary to volume overload vs CAP  - Continue diuresis  - 7 day course of Zosyn for presumed CAP due to bibasilar consolidations on CT Possibly secondary to volume overload vs CAP  - Continue diuresis - bumetenide 2 mg q12hr  - 7 day course of Zosyn for presumed CAP due to bibasilar consolidations on CT  - BIPAP at night

## 2020-10-26 NOTE — CHART NOTE - NSCHARTNOTEFT_GEN_A_CORE
MICU Transfer Note    Transfer from: MICU    Transfer to: (x) Medicine    (  ) Telemetry     (   ) RCU        (    ) Palliative         (   ) Stroke Unit          (   ) __________________    Accepting physician: Dr. Krystle MORGAN COURSE:  10/24: Pt presenting in acute hypoxic/hypercapneic resp failure requiring intubation with propofol. He was started on a hep ggt for AC for afib (on home eliquis). Precedex was started to wean off propofol by morning. Zosyn and azithromycin started for CAP and pleural effusion loculated, pending legionella and MRSA PCR. Given one dose 2 mg bumex for vol o/l (on home torsemide). Started lopressor 5 mg Q6h for rate control. Placing OG tube and will place on tube feeds. Rectal temp 96.4F placed on hyperthermic blanket.    10/25: TTE performed with reduced LV/RV function since last echo on 8/1. Switch metoprolol to PO, and given another dose bumex 2 mg (received 2 mg last night and put out 500 cc immediately). Still appears very overloaded on exam. Continue diuresis as needed. Patient was successfully extubated to BIPAP. Now on aersol mask. Venous duplex of LE performed     10/26: Patient net negative I's and O's with SPo2 100% on 2L NC       ASSESSMENT & PLAN:     81 yo M pmhx HTN, afib (on eliquis), PE, HLD, CKD II, CVA, bladder CA, PVD presenting with one day of confusion likely due to hypoxic/hypercapnic respiratory failure no extubated with SPO02 100% on 2L NC    #Neuro  -AAOx4  - CTHead without any acute findings  - Hx of CVA with findings of R chronic cerebellar infarct  - will continue with anticoagulation for hx of strokes    #Cardiovascular  Pt presenting with fluid overload, proBNP>5000 iso not taking home lasix.   - Volume overload on physical exam  - Hx of PE, may be some component of Right heart strain   - Monitor blood pressures; currently no need for pressors  - Bedside POCUS  - TTE showing worsening RV/LV dysfunction   - Metoprolol changed to PO   - continue with AC as chadsvasc>2    #Pulmonary  Presenting with hypoxic/hypercapneic respiratory failure likely 2.2 overload, may have some component of pulmonary htn  - CT chest with evidence of pulmonary edema and b/l pleural effusions L>R  - Will diurese for goal net negative  -bibasilar consolidations as confirmed on CT and bedside POCUS Will cover with zosyn and azithromycin    #Renal  MERCY on CKD likely iso volume overload  - will diurese for goal net negative  - avoid nephrotoxins  - Hx of bladder Ca  - no evidence of hydronephrosis or obstruction on Ct abdomen     #GI  Mild ascites on recent CT abdomen  - trend LFT's  - will feed patient    #ID  currently afebrile, no leukocytosis  - Has weeping chronic wounds, will monitor   - treat for CAP with zosyn  - urine legionella negative   - mrsa swab pending     #Endo  No current issues    #Heme  H/H at baseline, will continue to trend  - need full dose AC for hx of cva, afib. Last dose of eliquis was oon 10/23    Lines: peripheral  DVT: full dose AC for afib  diet: normal     Vital Signs Last 24 Hrs  T(C): 37.4 (26 Oct 2020 12:00), Max: 37.9 (26 Oct 2020 00:00)  T(F): 99.3 (26 Oct 2020 12:00), Max: 100.2 (26 Oct 2020 00:00)  HR: 87 (26 Oct 2020 12:00) (75 - 109)  BP: 137/86 (26 Oct 2020 12:00) (100/63 - 137/86)  BP(mean): 107 (26 Oct 2020 12:00) (76 - 107)  RR: 22 (26 Oct 2020 12:00) (12 - 43)  SpO2: 93% (26 Oct 2020 12:00) (89% - 100%)  I&O's Summary    25 Oct 2020 07:01  -  26 Oct 2020 07:00  --------------------------------------------------------  IN: 1021.6 mL / OUT: 2070 mL / NET: -1048.4 mL    26 Oct 2020 07:01  -  26 Oct 2020 12:52  --------------------------------------------------------  IN: 32 mL / OUT: 100 mL / NET: -68 mL        MEDICATIONS  (STANDING):  chlorhexidine 4% Liquid 1 Application(s) Topical <User Schedule>  heparin  Infusion. 1600 Unit(s)/Hr (16 mL/Hr) IV Continuous <Continuous>  metoprolol tartrate Injectable 5 milliGRAM(s) IV Push every 6 hours  piperacillin/tazobactam IVPB.. 3.375 Gram(s) IV Intermittent every 8 hours    MEDICATIONS  (PRN):        LABS                                            10.7                  Neurophils% (auto):   x      (10-26 @ 00:17):    7.40 )-----------(198          Lymphocytes% (auto):  x                                             35.0                   Eosinphils% (auto):   x        Manual%: Neutrophils x    ; Lymphocytes x    ; Eosinophils x    ; Bands%: x    ; Blasts x                                    145    |  103    |  52                  Calcium: 8.4   / iCa: x      (10-26 @ 00:18)    ----------------------------<  127       Magnesium: 1.8                              3.5     |  33     |  1.89             Phosphorous: 3.8        ( 10-26 @ 00:18 )   PT: 16.9 sec;   INR: 1.43 ratio  aPTT: 93.5 sec

## 2020-10-26 NOTE — PROGRESS NOTE ADULT - ASSESSMENT
81 yo M pmhx HTN, afib (on eliquis), PE, HLD, CKD II, CVA, bladder CA, PVD presenting with one day of confusion likely due to hypoxic/hypercapnic respiratory failure now extubated with SPO2 100% on 2L NC     #Neuro  -Extubated with no sedation; AAOx3   - CTHead without any acute findings  - Hx of CVA with findings of R chronic cerebellar infarct  - will continue with anticoagulation for hx of strokes    #Cardiovascular  Pt presenting with fluid overload, proBNP>5000 iso not taking home lasix.   - Volume overload on physical exam  - Hx of PE, may be some component of Right heart strain   - Monitor blood pressures; currently no need for pressors  - Bedside POCUS  -TTE: worsening RV/LV dysfunction   - Metoprolol changed to PO   - continue with AC as chadsvasc>2    #Pulmonary  Presenting with hypoxic/hypercapneic respiratory failure likely 2.2 overload, may have some component of pulmonary htn  - CT chest with evidence of pulmonary edema and b/l pleural effusions L>R  - Will diurese for goal net negative  -bibasilar consolidations as confirmed on CT and bedside POCUS Will cover with zosyn and azithromycin  -2L NC SPO2 100%    #Renal  MERCY on CKD likely iso volume overload  - will check urine lytes  -Diurese for goal net negative  - avoid nephrotoxins  - Hx of bladder Ca  - no evidence of hydronephrosis or obstruction on Ct abdomen     #GI  Mild ascites on recent CT abdomen  - trend LFT's  - will feed patient    #ID  currently afebrile, no leukocytosis  - Has weeping chronic wounds, will monitor   - treat for CAP with zosyn   - Legionella; azithromycin Dc/ed   - mrsa swab pending     #Endo  No current issues    #Heme  H/H at baseline, will continue to trend  - need full dose AC for hx of cva, afib. Last dose of eliquis was oon 10/23    Lines: peripheral  DVT: full dose AC for afib  diet: Plan to restart on normal diet

## 2020-10-26 NOTE — PHYSICAL THERAPY INITIAL EVALUATION ADULT - ADDITIONAL COMMENTS
CT c/s: (-); CT brain:(-); (-)DVT B LE; CXR: small L pleural effusion,R midlung atelectasis; CT chest: (-)acute injury; CT abd: pulm edema, small B pleural effusion    social history: pt lives with wife in private house, 3 steps to enter from garage; pt has 12 steps to bedroom, pt states ambulates with straight cane, owns rolling walker.

## 2020-10-26 NOTE — SWALLOW BEDSIDE ASSESSMENT ADULT - SLP PERTINENT HISTORY OF CURRENT PROBLEM
This is a 79 yo M pmhx HTN, afib (on eliquis), PE, HLD, CKD II, CVA, bladder CA, PVD presenting with one day of confusion. Pt has been mainly homebound over the last several weeks related to chronic LE wounds, for which he was being seen by wound team. At that time he was also prescribed lasix due to LE edema, though was not taking it as prescribed, with wound nurses being concerned of fluid overload. Then patient was seemed to be confused yesterday with AOx1 and family decided to bring him in.  He did complain of some mild shortness of breath. No chest pain, abdominal pain or trouble urinating. It was noted that patient was refusing to take his Lasix

## 2020-10-26 NOTE — PROGRESS NOTE ADULT - ATTENDING COMMENTS
Patient seen and examined, labs and vitals are reviewed. Chart reviewed   79Y/O/M PMHx of Chronic A.Fib on Eliquis, PE, Cerebellar CVA, Bladder CA, BPH, CKD2, PVD initially admitted to MICU with Altered Mental Status,  and acute Hypoxic Hypercarbic Respiratory Failure secondary Acute Unspecified heart failure and suspected Pneumonia s/p extubation   continue Bumex   continue Abx  TTE noted with decline in LV and RV function  consider cardiology

## 2020-10-26 NOTE — PROGRESS NOTE ADULT - SUBJECTIVE AND OBJECTIVE BOX
PROGRESS NOTE:   Authored by Nani Dinero MD  Pager [phone] [facility]     Patient is a 80y old  Male who presents with a chief complaint of Hypoxic Respiratory Failure (26 Oct 2020 11:54)      SUBJECTIVE / OVERNIGHT EVENTS:    MEDICATIONS  (STANDING):  buMETAnide Injectable 2 milliGRAM(s) IV Push every 12 hours  chlorhexidine 4% Liquid 1 Application(s) Topical <User Schedule>  heparin  Infusion. 1600 Unit(s)/Hr (16 mL/Hr) IV Continuous <Continuous>  metoprolol tartrate Injectable 5 milliGRAM(s) IV Push every 6 hours  piperacillin/tazobactam IVPB.. 3.375 Gram(s) IV Intermittent every 8 hours    MEDICATIONS  (PRN):      I&O's Summary    25 Oct 2020 07:01  -  26 Oct 2020 07:00  --------------------------------------------------------  IN: 1021.6 mL / OUT: 2070 mL / NET: -1048.4 mL    26 Oct 2020 07:01  -  26 Oct 2020 15:30  --------------------------------------------------------  IN: 80 mL / OUT: 300 mL / NET: -220 mL        PHYSICAL EXAM:  Vital Signs Last 24 Hrs  T(C): 37.4 (26 Oct 2020 14:24), Max: 37.9 (26 Oct 2020 00:00)  T(F): 99.3 (26 Oct 2020 14:24), Max: 100.2 (26 Oct 2020 00:00)  HR: 96 (26 Oct 2020 14:24) (87 - 109)  BP: 136/94 (26 Oct 2020 14:24) (116/75 - 137/86)  BP(mean): 96 (26 Oct 2020 13:00) (76 - 107)  RR: 20 (26 Oct 2020 14:24) (15 - 43)  SpO2: 94% (26 Oct 2020 14:24) (89% - 100%)        LABS:                        10.7   7.40  )-----------( 198      ( 26 Oct 2020 00:17 )             35.0     10-26    145  |  103  |  52<H>  ----------------------------<  127<H>  3.5   |  33<H>  |  1.89<H>    Ca    8.4      26 Oct 2020 00:18  Phos  3.8     10-26  Mg     1.8     10-26      PT/INR - ( 26 Oct 2020 00:18 )   PT: 16.9 sec;   INR: 1.43 ratio         PTT - ( 26 Oct 2020 00:18 )  PTT:93.5 sec  CARDIAC MARKERS ( 24 Oct 2020 19:02 )  x     / x     / 42 U/L / x     / 2.1 ng/mL          Culture - Urine (collected 24 Oct 2020 17:14)  Source: .Urine Clean Catch (Midstream)  Final Report (25 Oct 2020 13:39):    No growth    Culture - Blood (collected 24 Oct 2020 15:00)  Source: .Blood Blood-Peripheral  Preliminary Report (25 Oct 2020 16:01):    No growth to date.    Culture - Blood (collected 24 Oct 2020 15:00)  Source: .Blood Blood-Peripheral  Preliminary Report (25 Oct 2020 16:01):    No growth to date.       PROGRESS NOTE:   Authored by Nani Dinero MD  Pager [phone] [facility]     Patient is a 80y old  Male who presents with a chief complaint of Hypoxic Respiratory Failure (26 Oct 2020 11:54)      SUBJECTIVE / OVERNIGHT EVENTS:  TTF today. Afebrile.     MEDICATIONS  (STANDING):  buMETAnide Injectable 2 milliGRAM(s) IV Push every 12 hours  chlorhexidine 4% Liquid 1 Application(s) Topical <User Schedule>  heparin  Infusion. 1600 Unit(s)/Hr (16 mL/Hr) IV Continuous <Continuous>  metoprolol tartrate Injectable 5 milliGRAM(s) IV Push every 6 hours  piperacillin/tazobactam IVPB.. 3.375 Gram(s) IV Intermittent every 8 hours    MEDICATIONS  (PRN):      I&O's Summary    25 Oct 2020 07:01  -  26 Oct 2020 07:00  --------------------------------------------------------  IN: 1021.6 mL / OUT: 2070 mL / NET: -1048.4 mL    26 Oct 2020 07:01  -  26 Oct 2020 15:30  --------------------------------------------------------  IN: 80 mL / OUT: 300 mL / NET: -220 mL        PHYSICAL EXAM:  Vital Signs Last 24 Hrs  T(C): 37.4 (26 Oct 2020 14:24), Max: 37.9 (26 Oct 2020 00:00)  T(F): 99.3 (26 Oct 2020 14:24), Max: 100.2 (26 Oct 2020 00:00)  HR: 96 (26 Oct 2020 14:24) (87 - 109)  BP: 136/94 (26 Oct 2020 14:24) (116/75 - 137/86)  BP(mean): 96 (26 Oct 2020 13:00) (76 - 107)  RR: 20 (26 Oct 2020 14:24) (15 - 43)  SpO2: 94% (26 Oct 2020 14:24) (89% - 100%)        LABS:                        10.7   7.40  )-----------( 198      ( 26 Oct 2020 00:17 )             35.0     10-26    145  |  103  |  52<H>  ----------------------------<  127<H>  3.5   |  33<H>  |  1.89<H>    Ca    8.4      26 Oct 2020 00:18  Phos  3.8     10-26  Mg     1.8     10-26      PT/INR - ( 26 Oct 2020 00:18 )   PT: 16.9 sec;   INR: 1.43 ratio         PTT - ( 26 Oct 2020 00:18 )  PTT:93.5 sec  CARDIAC MARKERS ( 24 Oct 2020 19:02 )  x     / x     / 42 U/L / x     / 2.1 ng/mL          Culture - Urine (collected 24 Oct 2020 17:14)  Source: .Urine Clean Catch (Midstream)  Final Report (25 Oct 2020 13:39):    No growth    Culture - Blood (collected 24 Oct 2020 15:00)  Source: .Blood Blood-Peripheral  Preliminary Report (25 Oct 2020 16:01):    No growth to date.    Culture - Blood (collected 24 Oct 2020 15:00)  Source: .Blood Blood-Peripheral  Preliminary Report (25 Oct 2020 16:01):    No growth to date.       PROGRESS NOTE:   Authored by Nani Dinero MD  Pager [phone] [facility]     Patient is a 80y old  Male who presents with a chief complaint of Hypoxic Respiratory Failure (26 Oct 2020 11:54)      SUBJECTIVE / OVERNIGHT EVENTS:  TTF today. Afebrile. On 2 L NC. Alert and oriented.     MEDICATIONS  (STANDING):  buMETAnide Injectable 2 milliGRAM(s) IV Push every 12 hours  chlorhexidine 4% Liquid 1 Application(s) Topical <User Schedule>  heparin  Infusion. 1600 Unit(s)/Hr (16 mL/Hr) IV Continuous <Continuous>  metoprolol tartrate Injectable 5 milliGRAM(s) IV Push every 6 hours  piperacillin/tazobactam IVPB.. 3.375 Gram(s) IV Intermittent every 8 hours    MEDICATIONS  (PRN):      I&O's Summary    25 Oct 2020 07:01  -  26 Oct 2020 07:00  --------------------------------------------------------  IN: 1021.6 mL / OUT: 2070 mL / NET: -1048.4 mL    26 Oct 2020 07:01  -  26 Oct 2020 15:30  --------------------------------------------------------  IN: 80 mL / OUT: 300 mL / NET: -220 mL        PHYSICAL EXAM:  Vital Signs Last 24 Hrs  T(C): 37.4 (26 Oct 2020 14:24), Max: 37.9 (26 Oct 2020 00:00)  T(F): 99.3 (26 Oct 2020 14:24), Max: 100.2 (26 Oct 2020 00:00)  HR: 96 (26 Oct 2020 14:24) (87 - 109)  BP: 136/94 (26 Oct 2020 14:24) (116/75 - 137/86)  BP(mean): 96 (26 Oct 2020 13:00) (76 - 107)  RR: 20 (26 Oct 2020 14:24) (15 - 43)  SpO2: 94% (26 Oct 2020 14:24) (89% - 100%)    GENERAL: NAD, well-groomed, well-developed  HEAD:  Atraumatic, Normocephalic  EYES: EOMI, PERRLA, conjunctiva and sclera clear  ENMT: No tonsillar erythema, exudates, or enlargement; Moist mucous membranes  NECK: Supple, No JVD, Normal thyroid  CHEST/LUNG: Some crackles at bilateral bases, no extra work of breathing  HEART: Regular rate and rhythm; No murmurs, rubs, or gallops  ABDOMEN: Soft, Nontender, Nondistended; Bowel sounds present  NEURO: Alert & Oriented X3  EXTREMITIES: B/l pitting LE edema, no calf tenderness    LABS:                        10.7   7.40  )-----------( 198      ( 26 Oct 2020 00:17 )             35.0     10-26    145  |  103  |  52<H>  ----------------------------<  127<H>  3.5   |  33<H>  |  1.89<H>    Ca    8.4      26 Oct 2020 00:18  Phos  3.8     10-26  Mg     1.8     10-26      PT/INR - ( 26 Oct 2020 00:18 )   PT: 16.9 sec;   INR: 1.43 ratio         PTT - ( 26 Oct 2020 00:18 )  PTT:93.5 sec  CARDIAC MARKERS ( 24 Oct 2020 19:02 )  x     / x     / 42 U/L / x     / 2.1 ng/mL          Culture - Urine (collected 24 Oct 2020 17:14)  Source: .Urine Clean Catch (Midstream)  Final Report (25 Oct 2020 13:39):    No growth    Culture - Blood (collected 24 Oct 2020 15:00)  Source: .Blood Blood-Peripheral  Preliminary Report (25 Oct 2020 16:01):    No growth to date.    Culture - Blood (collected 24 Oct 2020 15:00)  Source: .Blood Blood-Peripheral  Preliminary Report (25 Oct 2020 16:01):    No growth to date.

## 2020-10-26 NOTE — CHART NOTE - NSCHARTNOTEFT_GEN_A_CORE
: Bruce Garcia MD    INDICATION: respiratory failure     PROCEDURE:  [x ] LIMITED ECHO  [ ] LIMITED CHEST  [ ] LIMITED RETROPERITONEAL  [ ] LIMITED ABDOMINAL  [ ] LIMITED DVT  [ ] NEEDLE GUIDANCE VASCULAR  [ ] NEEDLE GUIDANCE THORACENTESIS  [ ] NEEDLE GUIDANCE PARACENTESIS  [ ] NEEDLE GUIDANCE PERICARDIOCENTESIS  [ ] OTHER    FINDINGS:  Lung: a line pattern with occasional single B, anechoic PLEF bilaterally     INTERPRETATION:  Normal aeration pattern with b/l PLEF      Images uploaded to [employer]

## 2020-10-26 NOTE — PHYSICAL THERAPY INITIAL EVALUATION ADULT - DISCHARGE DISPOSITION, PT EVAL
rehabilitation facility/Subacute Rehab, if pt goes home, will then require home PT and assistance for ALL functional mobility and activities, rolling walker

## 2020-10-26 NOTE — PROGRESS NOTE ADULT - PROBLEM SELECTOR PLAN 4
- 5 mg metoprolol IV q6hr till tonight-> Will switch to PO Lopressor 25 mg  - Getting diuresis with bumetanide

## 2020-10-26 NOTE — PHYSICAL THERAPY INITIAL EVALUATION ADULT - TRANSFER TRAINING, PT EVAL
GOAL: Pt will perform sit-stand transfers indenpendently with appropriate assistive device by 2 weeks

## 2020-10-26 NOTE — SWALLOW BEDSIDE ASSESSMENT ADULT - COMMENTS
Continued; because he did not like getting up several times in the middle of the night to urinate. At baseline patient is AOx4 and per son takes care of himself relatively well. 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 In the ED the patient was noted to be confused and hypoxic. His VBG was notable for pCO2 80's, pH 7.26. ProBNP >500 and CT Chest abdomen pelvis with pulmonary edema and pleural effusions. Pt was intubated and sedated for hypoxic/hypercapnic respiratory failure. + fluid overload. Concern for both PNA and HF exacerbation. Pt on abx, f/u cx. TTE performed with reduced LV/RV function since last echo on 8/1. CT chest with evidence of pulmonary edema and b/l pleural effusions L>R. Mild ascites on recent CT abdomen.  Patient was successfully extubated to BIPAP. Pt failed RN swallow screen in MICU; pt indicated “I have trouble w/ water”.     Dysphagia history: Pt is known to this service from admission in 2010.   -->  FEESST: Right vocal cord paralysis in the paramedian position. Silent penetration wo retrieval of nectar thickened liquid over the aryepiglottic folds, not eliminated with a right head turn. Silent penetration w/o retrieval of honey thickened liquid eliminated with a right head turn. Mild pharyngeal stasis mixed with secretions.  Impressions: Pt presents with a mild to moderate pharyngeal dysphagia and right vocal cord paralysis in the para-median position. Silent penetration without retrieval of nectar thickened liquid, not eliminated with a right head turn, and of honey thickened liquid, eliminated with a right head turn. Mild pharyngeal stasis mixed with secretions. Rec's- Dysphagia 3 with honey thickened liquids with a right head rotation. Aspiration precautions. ENT consult. No straws. Small single sips. Alternate solids and liquids.

## 2020-10-26 NOTE — PHYSICAL THERAPY INITIAL EVALUATION ADULT - PERTINENT HX OF CURRENT PROBLEM, REHAB EVAL
Pt is a 81 yo M admitted to [facility] on 10/24/20 pmhx HTN, afib (on eliquis), PE, HLD, CKD II, CVA, bladder CA, PVD presenting with one day of confusion. Pt has been mainly homebound over the last several weeks related to chronic LE wounds, for which he was being seen by wound team. At that time he was also prescribed lasix due to LE edema, though was not taking it as prescribed, with wound nurses being concerned of fluid overload.

## 2020-10-26 NOTE — PROGRESS NOTE ADULT - ASSESSMENT
79 yo M pmhx HTN, afib (on eliquis), PE, HLD, CKD II, CVA, bladder CA s/p surgery, PVD presenting with one day of confusion likely due to hypoxic/hypercapnic respiratory failure now extubated with SPO2 100% on 2L NC

## 2020-10-26 NOTE — SWALLOW BEDSIDE ASSESSMENT ADULT - SWALLOW EVAL: DIAGNOSIS
This is a 79 yo M pmhx HTN, afib (on eliquis), PE, HLD, CKD II, CVA, bladder CA, PVD presenting with one day of confusion likely due to hypoxic/hypercapnic respiratory failure. Pt with history of silent aspiration. Recommend MBSS to definitively assess swallow function.

## 2020-10-26 NOTE — PHYSICAL THERAPY INITIAL EVALUATION ADULT - PRECAUTIONS/LIMITATIONS, REHAB EVAL
+confused yesterday with AOx1 and family decided to bring him in. Patient has had no sick contacts and had no fever, chills, cough. He did complain of some mild shortness of breath. No chest pain, abdominal pain or trouble urinating. It was noted that patient was refusing to take his lasix because he did not like getting up several times in the middle of the night to urinate. At baseline patient is AOx4 and per son takes care of himself relatively well. 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 Hospital course: In the ED the patient was noted to be confused and hypoxic. His VBG was notable for pCO2 80's, pH 7.26. ProBNP >500 and CT Chest abdomen pelvis with pulmonary edema and pleural effusions. Pt was intubated and sedated for hypoxic/hypercapnic respiratory failure. CTHead without any acute findings,  Hx of CVA with findings of R chronic cerebellar infarct, CT chest with evidence of pulmonary edema and b/l pleural effusions L>R, Concern for both PNA and HF exacerbation. (-) DVT BLE duplex,TTE:  Compared with echocardiogram of 8/1/2019, LV/RV function now reduced.

## 2020-10-26 NOTE — PROGRESS NOTE ADULT - PROBLEM SELECTOR PLAN 2
TTE on Oct 25 with EF of 35-40%  - Continue diuresis with PO Bumetanide 2 mg q12hr  - 5 mg metoprolol q6hr ->  - Strict I&Os  - Daily weight  - Aim for net 1 L negative TTE on Oct 25 with EF of 35-40%  - Continue diuresis with PO Bumetanide 2 mg q12hr  - 5 mg metoprolol IV q6hr till tonight-> Will switch to PO Lopressor 25 mg tomorrow  - Strict I&Os  - Daily weight  - Aim for net 1 L negative

## 2020-10-26 NOTE — CHART NOTE - NSCHARTNOTEFT_GEN_A_CORE
MAR Accept Note    Pt summary:    81 yo M pmhx HTN, afib (on eliquis), PE, HLD, CKD II, CVA, bladder CA, PVD presenting with AMS a/w acute hypoxic/respiratory failure 2/2 PNA. Pt presented w/ acute hypoxic/hypercapneic resp failure requiring intubationl. He was started on a hep ggt for AC for afib (on home eliquis). Zosyn and azithromycin started for CAP and pleural effusion loculated, pending legionella and MRSA PCR. Pt diuresed w/ bumex for vol o/l (on home torsemide). Started lopressor 5 mg Q6h for rate control. TTE performed with reduced LV/RV function since last echo on 8/1. Switch metoprolol to PO. Patient was successfully extubated to BIPAP. Venous duplex of LE performed. Blood and urine cultures (10/24) have no growth to date; legionella negative.     Follow up:     [  ] PT recs  [  ] C/w abx for PNA

## 2020-10-26 NOTE — SWALLOW BEDSIDE ASSESSMENT ADULT - ASR SWALLOW REFERRAL
If vocal quality does not improve would suggest ENT. Pt indicated improvement since 10/25 with regards to vocal quality. pt indicated vocal quality improving, notable history Right vocal cord paralysis in the paramedian position./ENT

## 2020-10-26 NOTE — PHYSICAL THERAPY INITIAL EVALUATION ADULT - CRITERIA FOR SKILLED THERAPEUTIC INTERVENTIONS
therapy frequency/anticipated discharge recommendation/functional limitations in following categories/impairments found/predicted duration of therapy intervention/anticipated equipment needs at discharge/rehab potential

## 2020-10-26 NOTE — PHYSICAL THERAPY INITIAL EVALUATION ADULT - PLANNED THERAPY INTERVENTIONS, PT EVAL
bed mobility training/transfer training/gait training wound care management/gait training/bed mobility training/transfer training

## 2020-10-26 NOTE — PROGRESS NOTE ADULT - PROBLEM SELECTOR PLAN 3
Transitioned off heparin drip  - Restart Eliquis 2.5 mg tonight  - Continue with metoprolol as above

## 2020-10-26 NOTE — PHYSICAL THERAPY INITIAL EVALUATION ADULT - IMPAIRMENTS FOUND, PT EVAL
muscle strength/gait, locomotion, and balance/aerobic capacity/endurance aerobic capacity/endurance/gait, locomotion, and balance/muscle strength/integumentary integrity

## 2020-10-27 LAB
A1C WITH ESTIMATED AVERAGE GLUCOSE RESULT: 6.6 % — HIGH (ref 4–5.6)
ALBUMIN SERPL ELPH-MCNC: 3 G/DL — LOW (ref 3.3–5)
ALP SERPL-CCNC: 106 U/L — SIGNIFICANT CHANGE UP (ref 40–120)
ALT FLD-CCNC: 17 U/L — SIGNIFICANT CHANGE UP (ref 10–45)
ANION GAP SERPL CALC-SCNC: 11 MMOL/L — SIGNIFICANT CHANGE UP (ref 5–17)
ANION GAP SERPL CALC-SCNC: 8 MMOL/L — SIGNIFICANT CHANGE UP (ref 5–17)
AST SERPL-CCNC: 15 U/L — SIGNIFICANT CHANGE UP (ref 10–40)
BILIRUB SERPL-MCNC: 0.6 MG/DL — SIGNIFICANT CHANGE UP (ref 0.2–1.2)
BUN SERPL-MCNC: 38 MG/DL — HIGH (ref 7–23)
BUN SERPL-MCNC: 38 MG/DL — HIGH (ref 7–23)
CALCIUM SERPL-MCNC: 8.8 MG/DL — SIGNIFICANT CHANGE UP (ref 8.4–10.5)
CALCIUM SERPL-MCNC: 8.9 MG/DL — SIGNIFICANT CHANGE UP (ref 8.4–10.5)
CHLORIDE SERPL-SCNC: 101 MMOL/L — SIGNIFICANT CHANGE UP (ref 96–108)
CHLORIDE SERPL-SCNC: 103 MMOL/L — SIGNIFICANT CHANGE UP (ref 96–108)
CO2 SERPL-SCNC: 33 MMOL/L — HIGH (ref 22–31)
CO2 SERPL-SCNC: 38 MMOL/L — HIGH (ref 22–31)
CREAT SERPL-MCNC: 1.79 MG/DL — HIGH (ref 0.5–1.3)
CREAT SERPL-MCNC: 2.02 MG/DL — HIGH (ref 0.5–1.3)
ESTIMATED AVERAGE GLUCOSE: 143 MG/DL — HIGH (ref 68–114)
GLUCOSE SERPL-MCNC: 102 MG/DL — HIGH (ref 70–99)
GLUCOSE SERPL-MCNC: 133 MG/DL — HIGH (ref 70–99)
HCT VFR BLD CALC: 35.5 % — LOW (ref 39–50)
HGB BLD-MCNC: 10.6 G/DL — LOW (ref 13–17)
MAGNESIUM SERPL-MCNC: 1.8 MG/DL — SIGNIFICANT CHANGE UP (ref 1.6–2.6)
MAGNESIUM SERPL-MCNC: 1.8 MG/DL — SIGNIFICANT CHANGE UP (ref 1.6–2.6)
MCHC RBC-ENTMCNC: 24.8 PG — LOW (ref 27–34)
MCHC RBC-ENTMCNC: 29.9 GM/DL — LOW (ref 32–36)
MCV RBC AUTO: 82.9 FL — SIGNIFICANT CHANGE UP (ref 80–100)
NRBC # BLD: 0 /100 WBCS — SIGNIFICANT CHANGE UP (ref 0–0)
PHOSPHATE SERPL-MCNC: 2.9 MG/DL — SIGNIFICANT CHANGE UP (ref 2.5–4.5)
PHOSPHATE SERPL-MCNC: 3.8 MG/DL — SIGNIFICANT CHANGE UP (ref 2.5–4.5)
PLATELET # BLD AUTO: 188 K/UL — SIGNIFICANT CHANGE UP (ref 150–400)
POTASSIUM SERPL-MCNC: 3.2 MMOL/L — LOW (ref 3.5–5.3)
POTASSIUM SERPL-MCNC: 3.7 MMOL/L — SIGNIFICANT CHANGE UP (ref 3.5–5.3)
POTASSIUM SERPL-SCNC: 3.2 MMOL/L — LOW (ref 3.5–5.3)
POTASSIUM SERPL-SCNC: 3.7 MMOL/L — SIGNIFICANT CHANGE UP (ref 3.5–5.3)
PROT SERPL-MCNC: 6.1 G/DL — SIGNIFICANT CHANGE UP (ref 6–8.3)
RBC # BLD: 4.28 M/UL — SIGNIFICANT CHANGE UP (ref 4.2–5.8)
RBC # FLD: 16.6 % — HIGH (ref 10.3–14.5)
SODIUM SERPL-SCNC: 147 MMOL/L — HIGH (ref 135–145)
SODIUM SERPL-SCNC: 147 MMOL/L — HIGH (ref 135–145)
WBC # BLD: 5.36 K/UL — SIGNIFICANT CHANGE UP (ref 3.8–10.5)
WBC # FLD AUTO: 5.36 K/UL — SIGNIFICANT CHANGE UP (ref 3.8–10.5)

## 2020-10-27 PROCEDURE — 99232 SBSQ HOSP IP/OBS MODERATE 35: CPT | Mod: GC

## 2020-10-27 PROCEDURE — 99233 SBSQ HOSP IP/OBS HIGH 50: CPT

## 2020-10-27 PROCEDURE — 74230 X-RAY XM SWLNG FUNCJ C+: CPT | Mod: 26

## 2020-10-27 RX ORDER — POTASSIUM CHLORIDE 20 MEQ
20 PACKET (EA) ORAL
Refills: 0 | Status: COMPLETED | OUTPATIENT
Start: 2020-10-27 | End: 2020-10-27

## 2020-10-27 RX ORDER — ASPIRIN/CALCIUM CARB/MAGNESIUM 324 MG
81 TABLET ORAL DAILY
Refills: 0 | Status: DISCONTINUED | OUTPATIENT
Start: 2020-10-27 | End: 2020-11-02

## 2020-10-27 RX ORDER — SIMVASTATIN 20 MG/1
40 TABLET, FILM COATED ORAL AT BEDTIME
Refills: 0 | Status: DISCONTINUED | OUTPATIENT
Start: 2020-10-27 | End: 2020-11-02

## 2020-10-27 RX ORDER — BUMETANIDE 0.25 MG/ML
1 INJECTION INTRAMUSCULAR; INTRAVENOUS DAILY
Refills: 0 | Status: DISCONTINUED | OUTPATIENT
Start: 2020-10-27 | End: 2020-10-28

## 2020-10-27 RX ADMIN — PIPERACILLIN AND TAZOBACTAM 25 GRAM(S): 4; .5 INJECTION, POWDER, LYOPHILIZED, FOR SOLUTION INTRAVENOUS at 13:39

## 2020-10-27 RX ADMIN — Medication 50 MILLIEQUIVALENT(S): at 17:42

## 2020-10-27 RX ADMIN — Medication 50 MILLIEQUIVALENT(S): at 13:39

## 2020-10-27 RX ADMIN — BUMETANIDE 2 MILLIGRAM(S): 0.25 INJECTION INTRAMUSCULAR; INTRAVENOUS at 06:25

## 2020-10-27 RX ADMIN — Medication 25 MILLIGRAM(S): at 06:24

## 2020-10-27 RX ADMIN — Medication 50 MILLIEQUIVALENT(S): at 21:05

## 2020-10-27 RX ADMIN — APIXABAN 2.5 MILLIGRAM(S): 2.5 TABLET, FILM COATED ORAL at 13:38

## 2020-10-27 RX ADMIN — PIPERACILLIN AND TAZOBACTAM 25 GRAM(S): 4; .5 INJECTION, POWDER, LYOPHILIZED, FOR SOLUTION INTRAVENOUS at 20:59

## 2020-10-27 RX ADMIN — PIPERACILLIN AND TAZOBACTAM 25 GRAM(S): 4; .5 INJECTION, POWDER, LYOPHILIZED, FOR SOLUTION INTRAVENOUS at 06:24

## 2020-10-27 RX ADMIN — Medication 25 MILLIGRAM(S): at 17:43

## 2020-10-27 RX ADMIN — SIMVASTATIN 40 MILLIGRAM(S): 20 TABLET, FILM COATED ORAL at 21:05

## 2020-10-27 RX ADMIN — APIXABAN 2.5 MILLIGRAM(S): 2.5 TABLET, FILM COATED ORAL at 21:05

## 2020-10-27 NOTE — SWALLOW VFSS/MBS ASSESSMENT ADULT - NS SWALLOW VFSS REC ASPIR MON
throat clearing/change of breathing pattern/gurgly voice/upper respiratory infection/fever/pneumonia/Monitor for s/s aspiration/laryngeal penetration. If noted:  D/C p.o. intake, provide non-oral nutrition/hydration/meds, and contact this service @ [email or web address]

## 2020-10-27 NOTE — CHART NOTE - NSCHARTNOTEFT_GEN_A_CORE
Received outside records from patient's cardiologist, Dr. Rodri Julian ([facility])    Patient also noted to have EF of 55% on Echo in February per discussion with outpatient cardiology office    Patient prescribed  Enalabril 20 mg daily  Triamterene 37.5 Hydrochlorothiazide 25 mg 2 tabs daily  ASA 81 mg daily  Lopressor 100 mg BID  Losartan 100 mg daily  Eliquis 2.5 mg BID  Lasix 40 mg as needed  Simvastatin 40 mg daily    - Unclear if patient taking medications as prescribed  - Will re-introduce as indicated/tolerated  - Cardiology following for new onset HF    Nani Dinero MD  PGY1 Medicine Received outside records from patient's cardiologist, Dr. Rodri Julian ([facility])    Patient prescribed  Enalabril 20 mg daily  Triamterene 37.5 Hydrochlorothiazide 25 mg 2 tabs daily  ASA 81 mg daily  Lopressor 100 mg BID  Losartan 100 mg daily  Eliquis 2.5 mg BID  Lasix 40 mg as needed  Simvastatin 40 mg daily    Patient underwent pre-op eval for hip replacement which included nuclear stress test with lexiscan. Rest EF was 63% and stress EF was 64% on 3/16/2020 with no ischemia noted. Normal myocardial perfusion SPECT imaging study then.    - Unclear if patient taking medications as prescribed  - Will re-introduce as indicated/tolerated  - Cardiology following for new onset HF    Nani Dinero MD  PGY1 Medicine

## 2020-10-27 NOTE — PROGRESS NOTE ADULT - PROBLEM SELECTOR PLAN 1
Possibly secondary to volume overload vs CAP  - Continue diuresis - bumetenide 2 mg q12hr  - 7 day course of Zosyn for presumed CAP due to bibasilar consolidations on CT  - BIPAP at night Possibly secondary to volume overload vs CAP  - Reduce diuresis to 1 mg budesonide. Recheck BMP in PM to evaluate for potential overdiuresis  - 5 day course of Zosyn for presumed CAP due to bibasilar consolidations on CT  - BIPAP at night

## 2020-10-27 NOTE — SWALLOW VFSS/MBS ASSESSMENT ADULT - ADDITIONAL INFORMATION
+ slight calcifications of laryngeal structures.  + exaggeration of typical lordosis of superior C-spine and slight reversal of lordosis of inferior C-spine.  + anterior cervical osteophytes at C5-C7.  + cricopharyngeal bar at level of C5.

## 2020-10-27 NOTE — CONSULT NOTE ADULT - ATTENDING COMMENTS
80 year old man with history of chronic renal insufficiency, atrial fibrillation on apixaban, HF with preserved EF, prior CVA (cerebellar) and chronic venous insufficiency who presented with altered mental status and found to have pleural effusions and infiltrates on chest CT. He was intubated in the ER. Now extubated and mental status back at baseline, breathing much improved with diuretics. Cardiology consulted for newly reduced EF by echo (35-40%) on 10/25. Hs Smita negative x2. BNP on admission ~ 5700. ECG with more prominent counterclockwise rotation but overall without significant changes from prior ECG from 2019. On prior echo here in 2019 (done for edema) LV was poorly visualized, but reported LVEF was normal. The patient reports undergoing a cardiology evaluation at [facility] with a stress test approximately 6 months ago, which he tells me was normal. He denies any chest pain.    Etiology of acute congestive heart failure with reduced ejection fraction is uncertain at this time. Differential includes ischemia, stress type cardiomyopathy, infection, or hypertensive heart disease. He has evidence of LVH on ECG. He is on suboptimal anticoagulation for paroxsymal atrial fibrillation with high CHADS (prior stroke, reduced EF, HTN, age), would increase to full dose apixaban.    Recommendations:  1. Obtain outside stress test reports and cardiac records  2. continue IV diuresis for goal net negative 1-2 L daily  3. Start Entresto 24/26 BID or another ACE/ARB if cost prohibitive.  4. Continue metoprolol tartrate BID for now given history of AF  5. Continue anticoagulation for atrial fibrillation; appropriate dose of apixaban is 5 mg BID  6. Strict I/Os and low salt diet, fluid restriction  7. electrolyte replacement  8. telemetry

## 2020-10-27 NOTE — PROGRESS NOTE ADULT - PROBLEM SELECTOR PLAN 6
DVT prophylaxis - Eliquis  Diet - NPO till MBS tomorrow DVT prophylaxis - Eliquis  Diet - DASH carb controlled diet

## 2020-10-27 NOTE — SWALLOW VFSS/MBS ASSESSMENT ADULT - LARYNGEAL PENETRATION DURING THE SWALLOW - SILENT
over laryngeal surface of arytenoids; appears retrieved during swallow, or upon subsequent swallows/Trace

## 2020-10-27 NOTE — PROGRESS NOTE ADULT - ASSESSMENT
81 yo M pmhx HTN, afib (on eliquis), PE, HLD, CKD II, CVA, bladder CA s/p surgery, PVD presenting with one day of confusion likely due to hypoxic/hypercapnic respiratory failure now extubated with SPO2 100% on 2L NC

## 2020-10-27 NOTE — CONSULT NOTE ADULT - ASSESSMENT
81 yo M pmhx HTN, afib (on eliquis), PE, HLD, CKD II, CVA, bladder CA s/p surgery, PVD presenting with one day of confusion likely due to hypoxic/hypercapnic respiratory failure now extubated with SPO2 100% on 2L NC. Cardiology consulted for HFrEF (35-40%).     #HFrEF   - TTE on 10/25 with EF of 35-40%  - C/w Bumetanide 2mg PO q12h, goal of net negative 1L  - C/w lopressor 25mg BID   - Strict I&Os, daily weights     #Afib  - C/w lopressor 25mg and Eliquis 2.5mg BID   - Continue to monitor on telemetry        80 year old man with history of chronic renal insufficiency, atrial fibrillation on apixaban, HF with preserved EF, prior CVA (cerebellar) and chronic venous insufficiency who presented with altered mental status and found to have pleural effusions and infiltrates on chest CT. He was intubated in the ER. Now extubated and mental status back at baseline, breathing much improved with diuretics. Cardiology consulted for newly reduced EF by echo (35-40%) on 10/25. Hs Smita negative x2. BNP on admission ~ 5700. ECG with more prominent counterclockwise rotation but overall without significant changes from prior ECG from 2019. On prior echo here in 2019 (done for edema) LV was poorly visualized, but reported LVEF was normal. The patient reports undergoing a cardiology evaluation at [facility] with a stress test approximately 6 months ago, which he tells me was normal. He denies any chest pain.    Etiology of acute congestive heart failure with reduced ejection fraction is uncertain at this time. Differential includes ischemia, stress type cardiomyopathy, infection, or hypertensive heart disease. He has evidence of LVH on ECG. He is on suboptimal anticoagulation for paroxsymal atrial fibrillation with high CHADS (prior stroke, reduced EF, HTN, age), would increase to full dose apixaban.    Recommendations:  1. Obtain outside stress test reports and cardiac records  2. continue IV diuresis for goal net negative 1-2 L daily  3. Start Entresto 24/26 BID or another ACE/ARB if cost prohibitive.  4. Continue metoprolol tartrate BID for now given history of AF  5. Continue anticoagulation for atrial fibrillation; appropriate dose of apixaban is 5 mg BID  6. Strict I/Os and low salt diet, fluid restriction  7. electrolyte replacement  8. telemetry

## 2020-10-27 NOTE — SWALLOW VFSS/MBS ASSESSMENT ADULT - SLP GENERAL OBSERVATIONS
Pt encountered in radiology, secure in JESUS chair. Pt awake and alert, cooperative, following directions for the purposes of the exam.

## 2020-10-27 NOTE — SWALLOW VFSS/MBS ASSESSMENT ADULT - SUCCESSFUL STRATEGIES TRIALED DURING PROCEDURE
Small single sips, and R head turn each appeared to reduce degree and depth of laryngeal penetration.

## 2020-10-27 NOTE — PROGRESS NOTE ADULT - PROBLEM SELECTOR PLAN 7
Dispo - pending management of new hypoxia and treatment for CAP    PCP - Catalino Lara  Cardiology - Pt reports outside hospital cardiologist, unable to recall name  Vascular - Dr. Birmingham Dispo - pending diuresis. Pending cardiology eval, following up with outpatient cardiologist      PCP - Catalino Lara  Cardiology - Dr. Rodri Julian ([facility])  Vascular - Dr. Birmingham

## 2020-10-27 NOTE — SWALLOW VFSS/MBS ASSESSMENT ADULT - ORAL PHASE
+ Tongue pumping; piecemeal deglutition; mild lingual residue post swallow/Delayed oral transit time/Reduced anterior - posterior transport Delayed oral transit time/Reduced anterior - posterior transport/+ Tongue pumping; piecemeal deglutition; mild lingual residue post swallow piecemeal deglutition; mild lingual residue post swallow Delayed oral transit time/Reduced anterior - posterior transport/piecemeal deglutition; mild lingual residue post swallow

## 2020-10-27 NOTE — PROGRESS NOTE ADULT - PROBLEM SELECTOR PLAN 3
Transitioned off heparin drip  - Restart Eliquis 2.5 mg tonight  - Continue with metoprolol as above - Continue Eliquis 2.5 mg   - Continue with metoprolol as above

## 2020-10-27 NOTE — CONSULT NOTE ADULT - SUBJECTIVE AND OBJECTIVE BOX
Patient seen and evaluated at bedside. Sitting in the chair resting comfortably. Has no acute complaints. Denies SOB, CP, palpitations weakness, and states LE edema has significantly improved since admission. States recently just had multiple cardiac tests at [place] about one month ago.         HPI:  81 yo M pmhx HTN, afib (on eliquis), PE, HLD, CKD II, CVA, bladder CA, PVD presenting with one day of confusion. Pt has been mainly homebound over the last several weeks related to chronic LE wounds, for which he was being seen by wound team. At that time he was also prescribed lasix due to LE edema, though was not taking it as prescribed, with wound nurses being concerned of fluid overload. Then patient was seemed to be confused yesterday with AOx1 and family decided to bring him in. Patient has had no sick contacts and had no fever, chills, cough. He did complain of some mild shortness of breath. No chest pain, abdominal pain or trouble urinating. It was noted that patient was refusing to take his lasix because he did not like getting up several times in the middle of the night to urinate. At baseline patient is AOx4 and per son takes care of himself relatively well. 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     In the ED the patient was noted to be confused and hypoxic. His VBG was notable for pCO2 80's, pH 7.26. ProBNP >500 and CT Chest abdomen pelvis with pulmonary edema and pleural effusions. Pt was intubated and sedated for hypoxic/hypercapnic respiratory failure.      (24 Oct 2020 18:02)      PMHx:   Cerebrovascular accident (CVA)    Diabetes mellitus    Gouty arthritis    Bladder cancer    Bilat Ing Hernia    BPH (Benign Prostatic Hypertrophy)    Hyperlipidemia    Hypertension      PSHx:   Bladder cancer    Gouty Arthritis    Gouty Arthritis    Bilateral Inguinal Hernia (BIH)    Allergies:  No Known Allergies    Current Medications:   apixaban 2.5 milliGRAM(s) Oral two times a day  buMETAnide Injectable 1 milliGRAM(s) IV Push daily  metoprolol tartrate 25 milliGRAM(s) Oral two times a day  piperacillin/tazobactam IVPB.. 3.375 Gram(s) IV Intermittent every 8 hours  potassium chloride  20 mEq/100 mL IVPB 20 milliEquivalent(s) IV Intermittent every 2 hours    Review of Systems:  REVIEW OF SYSTEMS:  CONSTITUTIONAL: No weakness, fevers or chills  EYES/ENT: No visual changes  RESPIRATORY: No cough, wheezing, hemoptysis; No shortness of breath  CARDIOVASCULAR: No chest pain or palpitations;   GASTROINTESTINAL: No abdominal or epigastric pain. No nausea, vomiting, or hematemesis; No diarrhea or constipation. No melena or hematochezia.  GENITOURINARY: No dysuria, frequency or hematuria  NEUROLOGICAL: No numbness or weakness  SKIN: No itching, burning, rashes, or lesions   All other review of systems is negative unless indicated above.    Physical Exam:  T(F): 98 (10-27), Max: 98.2 (10-26)  HR: 99 (10-27) (91 - 101)  BP: 146/85 (10-27) (138/78 - 164/92)  RR: 18 (10-27)  SpO2: 95% (10-27)  GENERAL: No acute distress, well-developed  HEAD:  Atraumatic, Normocephalic  ENT: EOMI No JVD  CHEST/LUNG: Normal respiratory effort. B/L wheezing upper lobes. Decreased breath sounds at R base.   HEART: Regular rate and rhythm; No murmurs, rubs, or gallops  ABDOMEN: Soft, Nontender, Nondistended; Bowel sounds present  EXTREMITIES:  B/l LE edema. No tenderness to palpation.   PSYCH: Nl behavior, nl affect  NEUROLOGY: AAOx3    LINES:    Cardiovascular Diagnostic Testing:    ECG: Personally reviewed:    Imaging:    CXR: Personally reviewed    Labs: Personally reviewed                        10.6   5.36  )-----------( 188      ( 27 Oct 2020 07:25 )             35.5     10-27    147<H>  |  103  |  38<H>  ----------------------------<  102<H>  3.2<L>   |  33<H>  |  1.79<H>    Ca    8.8      27 Oct 2020 07:23  Phos  3.8     10-27  Mg     1.8     10-27    TPro  6.1  /  Alb  3.0<L>  /  TBili  0.6  /  DBili  x   /  AST  15  /  ALT  17  /  AlkPhos  106  10-27    PT/INR - ( 26 Oct 2020 00:18 )   PT: 16.9 sec;   INR: 1.43 ratio         PTT - ( 26 Oct 2020 00:18 )  PTT:93.5 sec      Serum Pro-Brain Natriuretic Peptide: 5699 pg/mL (10-24 @ 12:21)        Thyroid Stimulating Hormone, Serum: 1.20 uIU/mL (10-24 @ 23:50)     HPI:  79 yo M pmhx HTN, afib (on Eliquis), HLD, chronic renal insufficiency,  bladder CA, chronic venous insufficiency, who presented for altered mental status, confusion. Pt has been mainly homebound over the last several weeks related to chronic LE wounds, for which he was being seen by wound team. At that time he was also prescribed Lasix due to LE edema, though was not taking it as prescribed, with wound nurses being concerned of fluid overload. Then patient was seemed to be confused Friday, disoriented, so family brought him to hospital. Patient has had no sick contacts and had no fever, chills, cough. He did complain of some mild shortness of breath. No chest pain, abdominal pain or trouble urinating. It was noted that patient was refusing to take his lasix because he did not like getting up several times in the middle of the night to urinate. At baseline patient is AOx3 and per son takes care of himself relatively well. 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     In the ED the patient was noted to be confused and hypoxic. His VBG was notable for pCO2 80's, pH 7.26. ProBNP >500 and CT Chest abdomen pelvis with pulmonary edema and pleural effusions. Pt was intubated and sedated for hypoxic/hypercapnic respiratory failure.      (24 Oct 2020 18:02)      PMHx:   Cerebrovascular accident (CVA) - no details    Diabetes mellitus    Gouty arthritis    Bladder cancer    Bilat Ing Hernia    BPH (Benign Prostatic Hypertrophy)    Hyperlipidemia    Hypertension      PSHx:   Bladder cancer    Gouty Arthritis    Gouty Arthritis    Bilateral Inguinal Hernia (BIH)    Allergies:  No Known Allergies    Current Medications:   apixaban 2.5 milliGRAM(s) Oral two times a day  buMETAnide Injectable 1 milliGRAM(s) IV Push daily  metoprolol tartrate 25 milliGRAM(s) Oral two times a day  piperacillin/tazobactam IVPB.. 3.375 Gram(s) IV Intermittent every 8 hours  potassium chloride  20 mEq/100 mL IVPB 20 milliEquivalent(s) IV Intermittent every 2 hours    Review of Systems:  REVIEW OF SYSTEMS:  CONSTITUTIONAL: No weakness, fevers or chills  EYES/ENT: No visual changes  RESPIRATORY: No cough, wheezing, hemoptysis; No shortness of breath  CARDIOVASCULAR: No chest pain or palpitations;   GASTROINTESTINAL: No abdominal or epigastric pain. No nausea, vomiting, or hematemesis; No diarrhea or constipation. No melena or hematochezia.  GENITOURINARY: No dysuria, frequency or hematuria  NEUROLOGICAL: No numbness or weakness  SKIN: No itching, burning, rashes, or lesions   All other review of systems is negative unless indicated above.    Physical Exam:  T(F): 98 (10-27), Max: 98.2 (10-26)  HR: 99 (10-27) (91 - 101)  BP: 146/85 (10-27) (138/78 - 164/92)  RR: 18 (10-27)  SpO2: 95% (10-27)    GENERAL: No acute distress, well-developed  HEAD:  Atraumatic, Normocephalic  ENT: EOMI No JVD  CHEST/LUNG: Normal respiratory effort. B/L wheezing upper lobes. Decreased breath sounds at R base.   HEART: Regular rate and rhythm; No murmurs, rubs, or gallops  ABDOMEN: Soft, Nontender, Nondistended; Bowel sounds present  EXTREMITIES:  B/l LE edema. No tenderness to palpation.   PSYCH: Nl behavior, nl affect  NEUROLOGY: AAOx3    LINES:    Cardiovascular Diagnostic Testing:    ECG: Personally reviewed:    Imaging:   CT brain:    There is no hydrocephalus, mass effect, midline shift, acute intracranial hemorrhage, or brain edema. Chronic left superior cerebellar hemisphere infarct.    CXR: Personally reviewed    Labs: Personally reviewed                        10.6   5.36  )-----------( 188      ( 27 Oct 2020 07:25 )             35.5     10-27    147<H>  |  103  |  38<H>  ----------------------------<  102<H>  3.2<L>   |  33<H>  |  1.79<H>    Ca    8.8      27 Oct 2020 07:23  Phos  3.8     10-27  Mg     1.8     10-27    TPro  6.1  /  Alb  3.0<L>  /  TBili  0.6  /  DBili  x   /  AST  15  /  ALT  17  /  AlkPhos  106  10-27    PT/INR - ( 26 Oct 2020 00:18 )   PT: 16.9 sec;   INR: 1.43 ratio         PTT - ( 26 Oct 2020 00:18 )  PTT:93.5 sec      Serum Pro-Brain Natriuretic Peptide: 5699 pg/mL (10-24 @ 12:21)        Thyroid Stimulating Hormone, Serum: 1.20 uIU/mL (10-24 @ 23:50)

## 2020-10-27 NOTE — SWALLOW VFSS/MBS ASSESSMENT ADULT - DIAGNOSTIC IMPRESSIONS
79 yo male h/o CVA (2010) with dysphagia and silent aspiration at that time, now admitted with AMS, and hypoxic and hypercapnic respiratory failure requiring intubation, now extubated. Pt currently presents with evidence of a mild oropharyngeal dysphagia notable for impaired oral management with prolonged yet efficient mastication of solids, piecemeal deglutition and mild oral residue post swallow, which clears with reswallows, trace to mild pharyngeal residue, with laryngeal penetration of thin and nectar-thick liquids that appears to clear during swallow, or upon subsequent swallows. Use of small single sips reduces depth and degree of laryngeal penetration. Esophageal phase of swallow notable for trace retention in proximal esophagus, and intermittent air distention with liquids, suspected due to presence of cricopharyngeal bar, as per d/w Radiologist Dr. Heredia. Can consider GI/ENT consult for further assessment if clinically indicated. 81 yo male h/o CVA (2010) with dysphagia and silent aspiration at that time, now admitted with AMS, and hypoxic and hypercapnic respiratory failure requiring intubation, now extubated. Pt currently presents with evidence of a mild oropharyngeal dysphagia notable for impaired oral management with prolonged yet efficient mastication of solids, piecemeal deglutition and mild oral residue post swallow, which clears with reswallows, trace to mild pharyngeal residue, with laryngeal penetration of thin and nectar-thick liquids that appears to clear during swallow, or upon subsequent swallows. Use of small single sips or R head turn reduces depth and degree of laryngeal penetration. Esophageal phase of swallow notable for trace retention in proximal esophagus, and intermittent air distention with liquids, suspected due to presence of cricopharyngeal bar, as per d/w Radiologist Dr. Heredia. Can consider GI/ENT consult for further assessment if clinically indicated.    Disorders: reduced lingual strength/ROM/Rate of motion, mildly reduced BOT to posterior pharyngeal wall contact, intermittently mildly reduced laryngeal closure, reduced supraglottic sensation.

## 2020-10-27 NOTE — PROGRESS NOTE ADULT - PROBLEM SELECTOR PLAN 4
- 5 mg metoprolol IV q6hr till tonight-> Will switch to PO Lopressor 25 mg  - Getting diuresis with bumetanide PO Lopressor 25 mg  - Getting diuresis with bumetanide  - Follow up with outpatient cardiologist regarding prescribed medications. Appears patient not taking as prescribed.

## 2020-10-27 NOTE — PROGRESS NOTE ADULT - PROBLEM SELECTOR PLAN 2
TTE on Oct 25 with EF of 35-40%  - Continue diuresis with PO Bumetanide 2 mg q12hr  - 5 mg metoprolol IV q6hr till tonight-> Will switch to PO Lopressor 25 mg tomorrow  - Strict I&Os  - Daily weight  - Aim for net 1 L negative TTE on Oct 25 with EF of 35-40%  - Reduce diuresis to 1 mg budesonide. Recheck BMP in PM to evaluate for potential overdiuresis  - PO Lopressor 25 mg  - Follow up with outpatient cardiologist regarding prescribed medications. Appears patient not taking as prescribed.  - Strict I&Os  - Daily weight  - Aim for net 1 L negative

## 2020-10-27 NOTE — PROGRESS NOTE ADULT - ATTENDING COMMENTS
Patient seen and examined, labs and vitals are reviewed. Chart reviewed   81Y/O/M PMHx of Chronic A.Fib on Eliquis, PE, Cerebellar CVA, Bladder Ca, BPH, CKD2, PVD initially admitted to MICU with Altered Mental Status, and acute Hypoxic Hypercarbic Respiratory Failure secondary Acute Unspecified heart failure and suspected Pneumonia s/p extubation   continue Bumex IV now as still has sign of volume overload  continue Abx short course   Will consider cardiology consult   MBS noted

## 2020-10-27 NOTE — SWALLOW VFSS/MBS ASSESSMENT ADULT - ESOPHAGEAL STAGE
trace retention trace retention in proximal esophagus trace retention in proximal esophagus  intermittent air distention

## 2020-10-27 NOTE — PROGRESS NOTE ADULT - SUBJECTIVE AND OBJECTIVE BOX
PROGRESS NOTE:   Authored by Nani Dinero MD  Pager [phone] [facility]     Patient is a 80y old  Male who presents with a chief complaint of Hypoxic Respiratory Failure (26 Oct 2020 15:30)      SUBJECTIVE / OVERNIGHT EVENTS:    MEDICATIONS  (STANDING):  apixaban 2.5 milliGRAM(s) Oral two times a day  buMETAnide Injectable 2 milliGRAM(s) IV Push every 12 hours  metoprolol tartrate 25 milliGRAM(s) Oral two times a day  piperacillin/tazobactam IVPB.. 3.375 Gram(s) IV Intermittent every 8 hours    MEDICATIONS  (PRN):      I&O's Summary    25 Oct 2020 07:01  -  26 Oct 2020 07:00  --------------------------------------------------------  IN: 1021.6 mL / OUT: 2070 mL / NET: -1048.4 mL    26 Oct 2020 07:01  -  27 Oct 2020 06:38  --------------------------------------------------------  IN: 80 mL / OUT: 1950 mL / NET: -1870 mL        PHYSICAL EXAM:  Vital Signs Last 24 Hrs  T(C): 36.4 (26 Oct 2020 23:24), Max: 37.5 (26 Oct 2020 08:00)  T(F): 97.6 (26 Oct 2020 23:24), Max: 99.5 (26 Oct 2020 08:00)  HR: 98 (26 Oct 2020 23:24) (87 - 108)  BP: 138/78 (27 Oct 2020 06:23) (127/72 - 164/92)  BP(mean): 96 (26 Oct 2020 13:00) (91 - 107)  RR: 18 (26 Oct 2020 23:24) (18 - 38)  SpO2: 97% (26 Oct 2020 23:24) (93% - 98%)        LABS:                        10.7   7.40  )-----------( 198      ( 26 Oct 2020 00:17 )             35.0     10-26    145  |  103  |  52<H>  ----------------------------<  127<H>  3.5   |  33<H>  |  1.89<H>    Ca    8.4      26 Oct 2020 00:18  Phos  3.8     10-26  Mg     1.8     10-26      PT/INR - ( 26 Oct 2020 00:18 )   PT: 16.9 sec;   INR: 1.43 ratio         PTT - ( 26 Oct 2020 00:18 )  PTT:93.5 sec          Culture - Urine (collected 24 Oct 2020 17:14)  Source: .Urine Clean Catch (Midstream)  Final Report (25 Oct 2020 13:39):    No growth    Culture - Blood (collected 24 Oct 2020 15:00)  Source: .Blood Blood-Peripheral  Preliminary Report (25 Oct 2020 16:01):    No growth to date.    Culture - Blood (collected 24 Oct 2020 15:00)  Source: .Blood Blood-Peripheral  Preliminary Report (25 Oct 2020 16:01):    No growth to date.       PROGRESS NOTE:   Authored by Nani Dinero MD  Pager [phone] [facility]     Patient is a 80y old  Male who presents with a chief complaint of Hypoxic Respiratory Failure (26 Oct 2020 15:30)      SUBJECTIVE / OVERNIGHT EVENTS:    Afebrile. Net 1790 negative yesterday. Denies chest pain. On BIPAP overnight, on 2L NC.    MEDICATIONS  (STANDING):  apixaban 2.5 milliGRAM(s) Oral two times a day  buMETAnide Injectable 2 milliGRAM(s) IV Push every 12 hours  metoprolol tartrate 25 milliGRAM(s) Oral two times a day  piperacillin/tazobactam IVPB.. 3.375 Gram(s) IV Intermittent every 8 hours    MEDICATIONS  (PRN):      I&O's Summary    25 Oct 2020 07:01  -  26 Oct 2020 07:00  --------------------------------------------------------  IN: 1021.6 mL / OUT: 2070 mL / NET: -1048.4 mL    26 Oct 2020 07:01  -  27 Oct 2020 06:38  --------------------------------------------------------  IN: 80 mL / OUT: 1950 mL / NET: -1870 mL        PHYSICAL EXAM:  Vital Signs Last 24 Hrs  T(C): 36.4 (26 Oct 2020 23:24), Max: 37.5 (26 Oct 2020 08:00)  T(F): 97.6 (26 Oct 2020 23:24), Max: 99.5 (26 Oct 2020 08:00)  HR: 98 (26 Oct 2020 23:24) (87 - 108)  BP: 138/78 (27 Oct 2020 06:23) (127/72 - 164/92)  BP(mean): 96 (26 Oct 2020 13:00) (91 - 107)  RR: 18 (26 Oct 2020 23:24) (18 - 38)  SpO2: 97% (26 Oct 2020 23:24) (93% - 98%)        LABS:                        10.7   7.40  )-----------( 198      ( 26 Oct 2020 00:17 )             35.0     10-26    145  |  103  |  52<H>  ----------------------------<  127<H>  3.5   |  33<H>  |  1.89<H>    Ca    8.4      26 Oct 2020 00:18  Phos  3.8     10-26  Mg     1.8     10-26      PT/INR - ( 26 Oct 2020 00:18 )   PT: 16.9 sec;   INR: 1.43 ratio         PTT - ( 26 Oct 2020 00:18 )  PTT:93.5 sec          Culture - Urine (collected 24 Oct 2020 17:14)  Source: .Urine Clean Catch (Midstream)  Final Report (25 Oct 2020 13:39):    No growth    Culture - Blood (collected 24 Oct 2020 15:00)  Source: .Blood Blood-Peripheral  Preliminary Report (25 Oct 2020 16:01):    No growth to date.    Culture - Blood (collected 24 Oct 2020 15:00)  Source: .Blood Blood-Peripheral  Preliminary Report (25 Oct 2020 16:01):    No growth to date.       PROGRESS NOTE:   Authored by Nani Dinero MD  Pager [phone] [facility]     Patient is a 80y old  Male who presents with a chief complaint of Hypoxic Respiratory Failure (26 Oct 2020 15:30)      SUBJECTIVE / OVERNIGHT EVENTS:    Afebrile. Net 1790 negative yesterday. Denies chest pain. On BIPAP overnight, on 2L NC. Denying complaints.    MEDICATIONS  (STANDING):  apixaban 2.5 milliGRAM(s) Oral two times a day  buMETAnide Injectable 2 milliGRAM(s) IV Push every 12 hours  metoprolol tartrate 25 milliGRAM(s) Oral two times a day  piperacillin/tazobactam IVPB.. 3.375 Gram(s) IV Intermittent every 8 hours    MEDICATIONS  (PRN):      I&O's Summary    25 Oct 2020 07:01  -  26 Oct 2020 07:00  --------------------------------------------------------  IN: 1021.6 mL / OUT: 2070 mL / NET: -1048.4 mL    26 Oct 2020 07:01  -  27 Oct 2020 06:38  --------------------------------------------------------  IN: 80 mL / OUT: 1950 mL / NET: -1870 mL        PHYSICAL EXAM:  Vital Signs Last 24 Hrs  T(C): 36.4 (26 Oct 2020 23:24), Max: 37.5 (26 Oct 2020 08:00)  T(F): 97.6 (26 Oct 2020 23:24), Max: 99.5 (26 Oct 2020 08:00)  HR: 98 (26 Oct 2020 23:24) (87 - 108)  BP: 138/78 (27 Oct 2020 06:23) (127/72 - 164/92)  BP(mean): 96 (26 Oct 2020 13:00) (91 - 107)  RR: 18 (26 Oct 2020 23:24) (18 - 38)  SpO2: 97% (26 Oct 2020 23:24) (93% - 98%)    GENERAL: NAD, well-groomed, well-developed  HEAD:  Atraumatic, Normocephalic  EYES: EOMI, PERRLA, conjunctiva and sclera clear  ENMT: No tonsillar erythema, exudates, or enlargement; Moist mucous membranes  NECK: Supple, No JVD,  CHEST/LUNG: Some crackles at bilateral bases, no extra work of breathing  HEART: Regular rate and rhythm; No murmurs, rubs, or gallops  ABDOMEN: Soft, Nontender, Nondistended  NEURO: Alert & Oriented X3  EXTREMITIES: B/l pitting LE edema, no calf tenderness    LABS:                        10.7   7.40  )-----------( 198      ( 26 Oct 2020 00:17 )             35.0     10-26    145  |  103  |  52<H>  ----------------------------<  127<H>  3.5   |  33<H>  |  1.89<H>    Ca    8.4      26 Oct 2020 00:18  Phos  3.8     10-26  Mg     1.8     10-26      PT/INR - ( 26 Oct 2020 00:18 )   PT: 16.9 sec;   INR: 1.43 ratio         PTT - ( 26 Oct 2020 00:18 )  PTT:93.5 sec          Culture - Urine (collected 24 Oct 2020 17:14)  Source: .Urine Clean Catch (Midstream)  Final Report (25 Oct 2020 13:39):    No growth    Culture - Blood (collected 24 Oct 2020 15:00)  Source: .Blood Blood-Peripheral  Preliminary Report (25 Oct 2020 16:01):    No growth to date.    Culture - Blood (collected 24 Oct 2020 15:00)  Source: .Blood Blood-Peripheral  Preliminary Report (25 Oct 2020 16:01):    No growth to date.

## 2020-10-28 ENCOUNTER — TRANSCRIPTION ENCOUNTER (OUTPATIENT)
Age: 80
End: 2020-10-28

## 2020-10-28 LAB
ANION GAP SERPL CALC-SCNC: 10 MMOL/L — SIGNIFICANT CHANGE UP (ref 5–17)
BUN SERPL-MCNC: 37 MG/DL — HIGH (ref 7–23)
CALCIUM SERPL-MCNC: 9 MG/DL — SIGNIFICANT CHANGE UP (ref 8.4–10.5)
CHLORIDE SERPL-SCNC: 102 MMOL/L — SIGNIFICANT CHANGE UP (ref 96–108)
CO2 SERPL-SCNC: 35 MMOL/L — HIGH (ref 22–31)
CREAT SERPL-MCNC: 1.89 MG/DL — HIGH (ref 0.5–1.3)
GLUCOSE SERPL-MCNC: 107 MG/DL — HIGH (ref 70–99)
POTASSIUM SERPL-MCNC: 3.4 MMOL/L — LOW (ref 3.5–5.3)
POTASSIUM SERPL-SCNC: 3.4 MMOL/L — LOW (ref 3.5–5.3)
SODIUM SERPL-SCNC: 147 MMOL/L — HIGH (ref 135–145)

## 2020-10-28 PROCEDURE — 99233 SBSQ HOSP IP/OBS HIGH 50: CPT | Mod: GC

## 2020-10-28 PROCEDURE — 99233 SBSQ HOSP IP/OBS HIGH 50: CPT

## 2020-10-28 PROCEDURE — 99232 SBSQ HOSP IP/OBS MODERATE 35: CPT

## 2020-10-28 RX ORDER — APIXABAN 2.5 MG/1
5 TABLET, FILM COATED ORAL EVERY 12 HOURS
Refills: 0 | Status: DISCONTINUED | OUTPATIENT
Start: 2020-10-28 | End: 2020-11-02

## 2020-10-28 RX ORDER — POTASSIUM CHLORIDE 20 MEQ
40 PACKET (EA) ORAL EVERY 4 HOURS
Refills: 0 | Status: COMPLETED | OUTPATIENT
Start: 2020-10-28 | End: 2020-10-28

## 2020-10-28 RX ORDER — SACUBITRIL AND VALSARTAN 24; 26 MG/1; MG/1
1 TABLET, FILM COATED ORAL
Qty: 60 | Refills: 0
Start: 2020-10-28 | End: 2020-11-26

## 2020-10-28 RX ORDER — SACUBITRIL AND VALSARTAN 24; 26 MG/1; MG/1
1 TABLET, FILM COATED ORAL
Refills: 0 | Status: DISCONTINUED | OUTPATIENT
Start: 2020-10-28 | End: 2020-10-29

## 2020-10-28 RX ADMIN — Medication 40 MILLIEQUIVALENT(S): at 08:17

## 2020-10-28 RX ADMIN — Medication 40 MILLIEQUIVALENT(S): at 14:08

## 2020-10-28 RX ADMIN — PIPERACILLIN AND TAZOBACTAM 25 GRAM(S): 4; .5 INJECTION, POWDER, LYOPHILIZED, FOR SOLUTION INTRAVENOUS at 06:16

## 2020-10-28 RX ADMIN — SIMVASTATIN 40 MILLIGRAM(S): 20 TABLET, FILM COATED ORAL at 21:36

## 2020-10-28 RX ADMIN — Medication 25 MILLIGRAM(S): at 17:29

## 2020-10-28 RX ADMIN — SACUBITRIL AND VALSARTAN 1 TABLET(S): 24; 26 TABLET, FILM COATED ORAL at 17:29

## 2020-10-28 RX ADMIN — APIXABAN 5 MILLIGRAM(S): 2.5 TABLET, FILM COATED ORAL at 06:16

## 2020-10-28 RX ADMIN — Medication 81 MILLIGRAM(S): at 12:09

## 2020-10-28 RX ADMIN — APIXABAN 5 MILLIGRAM(S): 2.5 TABLET, FILM COATED ORAL at 17:30

## 2020-10-28 RX ADMIN — BUMETANIDE 1 MILLIGRAM(S): 0.25 INJECTION INTRAMUSCULAR; INTRAVENOUS at 06:15

## 2020-10-28 RX ADMIN — Medication 25 MILLIGRAM(S): at 06:15

## 2020-10-28 NOTE — DISCHARGE NOTE PROVIDER - HOSPITAL COURSE
HPI  81 yo M pmhx HTN, afib (on eliquis), PE, HLD, CKD II, CVA, bladder CA, PVD presenting with one day of confusion. Pt has been mainly homebound over the last several weeks related to chronic LE wounds, for which he was being seen by wound team. At that time he was also prescribed lasix due to LE edema, though was not taking it as prescribed, with wound nurses being concerned of fluid overload. Then patient was seemed to be confused yesterday with AOx1 and family decided to bring him in. Patient has had no sick contacts and had no fever, chills, cough. He did complain of some mild shortness of breath. No chest pain, abdominal pain or trouble urinating. It was noted that patient was refusing to take his lasix because he did not like getting up several times in the middle of the night to urinate. At baseline patient is AOx4 and per son takes care of himself relatively well. 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     In the ED the patient was noted to be confused and hypoxic. His VBG was notable for pCO2 80's, pH 7.26. ProBNP >500 and CT Chest abdomen pelvis with pulmonary edema and pleural effusions. Pt was intubated and sedated for hypoxic/hypercapnic respiratory failure.     Hospital Course HPI  81 yo M pmhx HTN, afib (on eliquis), PE, HLD, CKD II, CVA, bladder CA, PVD presenting with one day of confusion. Pt has been mainly homebound over the last several weeks related to chronic LE wounds, for which he was being seen by wound team. At that time he was also prescribed lasix due to LE edema, though was not taking it as prescribed, with wound nurses being concerned of fluid overload. Then patient was seemed to be confused yesterday with AOx1 and family decided to bring him in. Patient has had no sick contacts and had no fever, chills, cough. He did complain of some mild shortness of breath. No chest pain, abdominal pain or trouble urinating. It was noted that patient was refusing to take his lasix because he did not like getting up several times in the middle of the night to urinate. At baseline patient is AOx4 and per son takes care of himself relatively well. 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     In the ED the patient was noted to be confused and hypoxic. His VBG was notable for pCO2 80's, pH 7.26. ProBNP >500 and CT Chest abdomen pelvis with pulmonary edema and pleural effusions. Pt was intubated and sedated for hypoxic/hypercapnic respiratory failure.     10/24: Pt presenting in acute hypoxic/hypercapneic resp failure requiring intubation with propofol. He was started on a hep ggt for AC for afib (on home eliquis). Precedex was started to wean off propofol by morning. Zosyn and azithromycin started for CAP and pleural effusion loculated, pending legionella and MRSA PCR. Given one dose 2 mg bumex for vol o/l (on home torsemide). Started lopressor 5 mg Q6h for rate control. Placing OG tube and will place on tube feeds. Rectal temp 96.4F placed on hyperthermic blanket.    10/25: TTE performed with reduced LV/RV function since last echo on 8/1. Switch metoprolol to PO, and given another dose bumex 2 mg (received 2 mg last night and put out 500 cc immediately). Still appears very overloaded on exam. Continue diuresis as needed. Patient was successfully extubated to BIPAP. Now on aersol mask. Venous duplex of LE performed     10/26: Patient net negative I's and O's with SPo2 100% on 2L NC         Hospital Course  Patient admitted to the MICU for respiratory failure and intubated. He was treated as possible community acquired pneumonia vs. heart failure with antibiotics and with diuresis. He was extubated the next day and transitioned to nasal cannula. A repeat ECHO showed reduced EF since stress test in February at OSH. He was transferred to the floor on 10/26 on 2L NC and BIPAP at night. He continued diruesis and medical optimization with discussion between patient's outpatient cardiologist (Dr. Julian, [place]) and in house cardiology service. His dose of Eliquis was increased to 5 mg, he was resumed on Losartan, and he was switched to Coreg (from metoprolol). He was also seen by the wound care team for his bilateral lower extremity wounds secondary to edema and required three layers of compression wraps. He was noted to have desaturations on ambulation with PT assessment on room air, and recommended for ANDREA. ***He was planned for home oxygen*** He is planned for follow up with his wound care team (Dr. Birmingham) for further monitoring of his lower extremity swelling and wounds. ***On 11/***, patient was afebrile, HDS, ambulating, and ready for discharge to rehab with plan for follow up with his cardiologist.    Patient's family were kept updated during his hospital stay and agreeable to plan for rehab and for medication monitoring. HPI  81 yo M pmhx HTN, afib (on eliquis), PE, HLD, CKD II, CVA, bladder CA, PVD presenting with one day of confusion. Pt has been mainly homebound over the last several weeks related to chronic LE wounds, for which he was being seen by wound team. At that time he was also prescribed lasix due to LE edema, though was not taking it as prescribed, with wound nurses being concerned of fluid overload. Then patient was seemed to be confused yesterday with AOx1 and family decided to bring him in. Patient has had no sick contacts and had no fever, chills, cough. He did complain of some mild shortness of breath. No chest pain, abdominal pain or trouble urinating. It was noted that patient was refusing to take his lasix because he did not like getting up several times in the middle of the night to urinate. At baseline patient is AOx4 and per son takes care of himself relatively well. 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     In the ED the patient was noted to be confused and hypoxic. His VBG was notable for pCO2 80's, pH 7.26. ProBNP >500 and CT Chest abdomen pelvis with pulmonary edema and pleural effusions. Pt was intubated and sedated for hypoxic/hypercapnic respiratory failure.     10/24: Pt presenting in acute hypoxic/hypercapneic resp failure requiring intubation with propofol. He was started on a hep ggt for AC for afib (on home eliquis). Precedex was started to wean off propofol by morning. Zosyn and azithromycin started for CAP and pleural effusion loculated, pending legionella and MRSA PCR. Given one dose 2 mg bumex for vol o/l (on home torsemide). Started lopressor 5 mg Q6h for rate control. Placing OG tube and will place on tube feeds. Rectal temp 96.4F placed on hyperthermic blanket.    10/25: TTE performed with reduced LV/RV function since last echo on 8/1. Switch metoprolol to PO, and given another dose bumex 2 mg (received 2 mg last night and put out 500 cc immediately). Still appears very overloaded on exam. Continue diuresis as needed. Patient was successfully extubated to BIPAP. Now on aersol mask. Venous duplex of LE performed     10/26: Patient net negative I's and O's with SPo2 100% on 2L NC         Hospital Course  Patient admitted to the MICU for respiratory failure and intubated. He was treated as possible community acquired pneumonia vs. heart failure with antibiotics and with diuresis. He was extubated the next day and transitioned to nasal cannula. A repeat ECHO showed reduced EF since stress test in February at OSH. He was transferred to the floor on 10/26 on 2L NC and BIPAP at night. He continued diruesis and medical optimization with discussion between patient's outpatient cardiologist (Dr. Julian, [place]) and in house cardiology service. His dose of Eliquis was increased to 5 mg, he was resumed on Losartan, and he was switched to Coreg (from metoprolol). He was also seen by the wound care team for his bilateral lower extremity wounds secondary to edema and required three layers of compression wraps. He was noted to have desaturations on ambulation with PT assessment on room air, and recommended for ANDREA. He is planned for follow up with his wound care team (Dr. Birmingham) for further monitoring of his lower extremity swelling and wounds. On 11/2/2020, patient was afebrile, HDS, ambulating, and ready for discharge to rehab with plan for follow up with his cardiologist.    Patient's family were kept updated during his hospital stay and agreeable to plan for rehab and for medication monitoring.

## 2020-10-28 NOTE — DISCHARGE NOTE PROVIDER - PROVIDER TOKENS
PROVIDER:[[id number]],FOLLOWUP:[1 week],ESTABLISHEDPATIENT:[T]],PROVIDER:[TOKEN:[[id number]],ESTABLISHEDPATIENT:[T]],PROVIDER:[TOKEN:[[id number]],ESTABLISHEDPATIENT:[T]] PROVIDER:[TOKEN:[[id number]],FOLLOWUP:[1 week],ESTABLISHEDPATIENT:[T]],PROVIDER:[TOKEN:[[id number]],FOLLOWUP:[1 week],ESTABLISHEDPATIENT:[T]],PROVIDER:[TOKEN:[[id number]],FOLLOWUP:[1 week]]

## 2020-10-28 NOTE — PROGRESS NOTE ADULT - ASSESSMENT
80 year old man with history of chronic renal insufficiency, atrial fibrillation on apixaban, HF with preserved EF, prior CVA (cerebellar) and chronic venous insufficiency who presented with altered mental status and found to have pleural effusions and infiltrates on chest CT. He was intubated in the ER. Now extubated and mental status back at baseline, breathing much improved with diuretics. Cardiology consulted for newly reduced EF by echo (35-40%) on 10/25. Hs Smita negative x2. BNP on admission ~ 5700. ECG with more prominent counterclockwise rotation but overall without significant changes from prior ECG from 2019. On prior echo here in 2019 (done for edema) LV was poorly visualized, but reported LVEF was normal.     #Acute congestive HFrEF, likely secondary to hypertensive heart disease   - Records from [place] obtained by primary team: NST (3/2020: Resting EF 63%, stress EF 64%, no ischemic changes on EKG, normal SPECT)   - C/w torsemide 20mg daily   - C/w Eliquis 5mg BID  - C/w Lopressor 25mg BID and Simvastatin 40mg daily  - Start Entresto 24/26 BID or another ACE/ARB if cost prohibitive (if starting Entresto and was on ACEi during admission, requires 48hr washout period)   - Strict I/Os and low salt diet, fluid restriction  - Electrolyte replacement

## 2020-10-28 NOTE — DISCHARGE NOTE PROVIDER - NSDCMRMEDTOKEN_GEN_ALL_CORE_FT
aspirin 81 mg oral delayed release tablet: 1 tab(s) orally once a day  baclofen 10 mg oral tablet: 1 tab(s) orally 3 times a day  Eliquis 2.5 mg oral tablet: 1 tab(s) orally 2 times a day  enalapril 20 mg oral tablet: 1 tab(s) orally once a day  enalapril 20 mg oral tablet: 1 tab(s) orally once a day  febuxostat 40 mg oral tablet: 1 tab(s) orally once a day  finasteride 5 mg oral tablet: 1 tab(s) orally once a day  losartan 100 mg oral tablet: 1 tab(s) orally once a day  metoprolol tartrate 25 mg oral tablet: 1 tab(s) orally 2 times a day  sacubitril-valsartan 24 mg-26 mg oral tablet: 1 tab(s) orally 2 times a day  simvastatin 40 mg oral tablet: 1 tab(s) orally once a day (at bedtime)  tamsulosin 0.4 mg oral capsule: 1 cap(s) orally once a day  terazosin 2 mg oral capsule: 1 cap(s) orally once a day (at bedtime)  torsemide 20 mg oral tablet: 40 milligram(s) orally once a day  2 tablets  triamterene-hydrochlorothiazide 37.5 mg-25 mg oral capsule: 1 cap(s) orally once a day   aspirin 81 mg oral delayed release tablet: 1 tab(s) orally once a day  baclofen 10 mg oral tablet: 1 tab(s) orally 3 times a day  Eliquis 2.5 mg oral tablet: 1 tab(s) orally 2 times a day  enalapril 20 mg oral tablet: 1 tab(s) orally once a day  enalapril 20 mg oral tablet: 1 tab(s) orally once a day  febuxostat 40 mg oral tablet: 1 tab(s) orally once a day  finasteride 5 mg oral tablet: 1 tab(s) orally once a day  losartan 100 mg oral tablet: 1 tab(s) orally once a day  metoprolol tartrate 25 mg oral tablet: 1 tab(s) orally 2 times a day  simvastatin 40 mg oral tablet: 1 tab(s) orally once a day (at bedtime)  tamsulosin 0.4 mg oral capsule: 1 cap(s) orally once a day  terazosin 2 mg oral capsule: 1 cap(s) orally once a day (at bedtime)  torsemide 20 mg oral tablet: 40 milligram(s) orally once a day  2 tablets  triamterene-hydrochlorothiazide 37.5 mg-25 mg oral capsule: 1 cap(s) orally once a day   aspirin 81 mg oral delayed release tablet: 1 tab(s) orally once a day  atorvastatin 40 mg oral tablet: 1 tab(s) orally once a day (at bedtime)   baclofen 10 mg oral tablet: 1 tab(s) orally 3 times a day  bumetanide 2 mg oral tablet: 1 tab(s) orally once a day  Coreg 12.5 mg oral tablet: 1 tab(s) orally every 12 hours  Eliquis 5 mg oral tablet: 1 tab(s) orally every 12 hours  finasteride 5 mg oral tablet: 1 tab(s) orally once a day  Klor-Con M20 oral tablet, extended release: 2 tab(s) orally once a day   losartan 100 mg oral tablet: 1 tab(s) orally once a day

## 2020-10-28 NOTE — PROGRESS NOTE ADULT - PROBLEM SELECTOR PLAN 3
- Continue Eliquis 2.5 mg   - Continue with metoprolol as above PO Lopressor 25 mg  - Start Entresto today

## 2020-10-28 NOTE — DISCHARGE NOTE PROVIDER - CARE PROVIDERS DIRECT ADDRESSES
,DirectAddress_Unknown,[email or web address]

## 2020-10-28 NOTE — PROGRESS NOTE ADULT - PROBLEM SELECTOR PLAN 7
Dispo - pending diuresis. Pending cardiology eval, following up with outpatient cardiologist      PCP - Catalino Lara  Cardiology - Dr. Rodri Julian ([facility])  Vascular - Dr. Birmingham

## 2020-10-28 NOTE — PROGRESS NOTE ADULT - PROBLEM SELECTOR PLAN 4
PO Lopressor 25 mg  - Getting diuresis with bumetanide  - Follow up with outpatient cardiologist regarding prescribed medications. Appears patient not taking as prescribed. - S/p bladder surgery

## 2020-10-28 NOTE — PROGRESS NOTE ADULT - SUBJECTIVE AND OBJECTIVE BOX
Patient seen and evaluated at bedside    Current Medications:   apixaban 5 milliGRAM(s) Oral every 12 hours  aspirin enteric coated 81 milliGRAM(s) Oral daily  metoprolol tartrate 25 milliGRAM(s) Oral two times a day  potassium chloride    Tablet ER 40 milliEquivalent(s) Oral every 4 hours  simvastatin 40 milliGRAM(s) Oral at bedtime  torsemide 20 milliGRAM(s) Oral daily    Review of Systems:  REVIEW OF SYSTEMS:  CONSTITUTIONAL: No weakness, fevers or chills  EYES/ENT: No visual changes;  No dysphagia  NECK: No pain or stiffness  RESPIRATORY: No cough, wheezing, hemoptysis; No shortness of breath  CARDIOVASCULAR: No chest pain or palpitations; No lower extremity edema  GASTROINTESTINAL: No abdominal or epigastric pain. No nausea, vomiting, or hematemesis; No diarrhea or constipation. No melena or hematochezia.  BACK: No back pain  GENITOURINARY: No dysuria, frequency or hematuria  NEUROLOGICAL: No numbness or weakness  SKIN: No itching, burning, rashes, or lesions   All other review of systems is negative unless indicated above.    Physical Exam:  T(F): 98.1 (10-28), Max: 98.8 (10-27)  HR: 97 (10-28) (84 - 105)  BP: 144/92 (10-28) (144/92 - 151/81)  RR: 18 (10-28)  SpO2: 96% (10-28)  GENERAL: No acute distress, well-developed  HEAD:  Atraumatic, Normocephalic  ENT: EOMI, PERRLA, conjunctiva and sclera clear, Neck supple, No JVD, moist mucosa  CHEST/LUNG: Clear to auscultation bilaterally; No wheeze, equal breath sounds bilaterally   BACK: No spinal tenderness  HEART: Regular rate and rhythm; No murmurs, rubs, or gallops  ABDOMEN: Soft, Nontender, Nondistended; Bowel sounds present  EXTREMITIES:  No clubbing, cyanosis, or edema  PSYCH: Nl behavior, nl affect  NEUROLOGY: AAOx3, non-focal, cranial nerves intact  SKIN: Normal color, No rashes or lesions  LINES:    Cardiovascular Diagnostic Testing:    ECG: Personally reviewed:    Echo: Personally reviewed:    Imaging:    CXR: Personally reviewed    Labs: Personally reviewed                        10.6   5.36  )-----------( 188      ( 27 Oct 2020 07:25 )             35.5     10-28    147<H>  |  102  |  37<H>  ----------------------------<  107<H>  3.4<L>   |  35<H>  |  1.89<H>    Ca    9.0      28 Oct 2020 06:34  Phos  2.9     10-27  Mg     1.8     10-27    TPro  6.1  /  Alb  3.0<L>  /  TBili  0.6  /  DBili  x   /  AST  15  /  ALT  17  /  AlkPhos  106  10-27          Serum Pro-Brain Natriuretic Peptide: 5699 pg/mL (10-24 @ 12:21)        Thyroid Stimulating Hormone, Serum: 1.20 uIU/mL (10-24 @ 23:50)

## 2020-10-28 NOTE — PROGRESS NOTE ADULT - ATTENDING COMMENTS
Patient seen and examined, labs and vitals are reviewed. Chart reviewed   81Y/O/M PMHx of Chronic A.Fib on Eliquis, PE, Cerebellar CVA, Bladder Ca, BPH, CKD2, PVD initially admitted to MICU with Altered Mental Status, and acute Hypoxic Hypercarbic Respiratory Failure secondary Acute systolic heart failure was on IV Bumex and with contraction alkalosis , hypernatremia and hypokalemia   hold Bumex and restart PO   cardiology note appreciated   will start ARB and monitor creatinine   continue Eliquis   will DC  Abx tomorrow    skin care of lower extremity.   ACE bandage.

## 2020-10-28 NOTE — PROVIDER CONTACT NOTE (OTHER) - ACTION/TREATMENT ORDERED:
Notified MD Mcdaniels of situation, no further orders given at this time. Will continue to monitor pt.

## 2020-10-28 NOTE — DISCHARGE NOTE PROVIDER - CARE PROVIDER_API CALL
Rodri Julian  CARDIOVASCULAR DISEASE  [address], [address]  [place] [address]  Phone: [phone]  Fax: [phone]  Established Patient  Follow Up Time: 1 week    Catalino Lara)  [facility]  [address] [place] [address], [address]nd [place] [address]  Phone: [phone]  Fax: [phone]  Established Patient  Follow Up Time:     Sandra Birmingham  SURGERY  [address] [place] [address], [address]  [place] [address]  Phone: [phone]  Fax: [phone]  Established Patient  Follow Up Time:    Catalino Lara)  [facility] at [facility]  [address] [facility], [address]nd [place] [address]  Phone: [phone]  Fax: [phone]  Established Patient  Follow Up Time: 1 week    Sandra Birmingham  SURGERY  [address] [place] [address], [address]  [place] [address]  Phone: [phone]  Fax: [phone]  Established Patient  Follow Up Time: 1 week    Siria Jacobs  CARDIOVASCULAR DISEASE  [address] [place] [address]  Phone: [phone]  Fax: [phone]  Follow Up Time: 1 week

## 2020-10-28 NOTE — DISCHARGE NOTE PROVIDER - NSDCFUSCHEDAPPT_GEN_ALL_CORE_FT
NICOLLE COBB ; 10/30/2020 ; [facility] Surg Wound [address] NICOLLE COBB ; 11/10/2020 ; [facility] Cardio [address]

## 2020-10-28 NOTE — PROGRESS NOTE ADULT - SUBJECTIVE AND OBJECTIVE BOX
PROGRESS NOTE:   Authored by Nani Dinero MD  Pager [phone] [facility]     Patient is a 80y old  Male who presents with a chief complaint of Hypoxic Respiratory Failure (27 Oct 2020 15:33)      SUBJECTIVE / OVERNIGHT EVENTS:    MEDICATIONS  (STANDING):  apixaban 5 milliGRAM(s) Oral every 12 hours  aspirin enteric coated 81 milliGRAM(s) Oral daily  buMETAnide Injectable 1 milliGRAM(s) IV Push daily  metoprolol tartrate 25 milliGRAM(s) Oral two times a day  piperacillin/tazobactam IVPB.. 3.375 Gram(s) IV Intermittent every 8 hours  simvastatin 40 milliGRAM(s) Oral at bedtime    MEDICATIONS  (PRN):      I&O's Summary    27 Oct 2020 07:01  -  28 Oct 2020 07:00  --------------------------------------------------------  IN: 600 mL / OUT: 900 mL / NET: -300 mL        PHYSICAL EXAM:  Vital Signs Last 24 Hrs  T(C): 37.1 (27 Oct 2020 23:46), Max: 37.1 (27 Oct 2020 23:46)  T(F): 98.8 (27 Oct 2020 23:46), Max: 98.8 (27 Oct 2020 23:46)  HR: 92 (28 Oct 2020 06:17) (84 - 105)  BP: 149/82 (28 Oct 2020 06:17) (146/85 - 151/81)  BP(mean): --  RR: 18 (28 Oct 2020 06:17) (18 - 18)  SpO2: 98% (28 Oct 2020 06:17) (91% - 98%)        LABS:                        10.6   5.36  )-----------( 188      ( 27 Oct 2020 07:25 )             35.5     10-28    147<H>  |  102  |  37<H>  ----------------------------<  107<H>  3.4<L>   |  35<H>  |  1.89<H>    Ca    9.0      28 Oct 2020 06:34  Phos  2.9     10-27  Mg     1.8     10-27    TPro  6.1  /  Alb  3.0<L>  /  TBili  0.6  /  DBili  x   /  AST  15  /  ALT  17  /  AlkPhos  106  10-27               PROGRESS NOTE:   Authored by Nani Dinero MD  Pager [phone] [facility]     Patient is a 80y old  Male who presents with a chief complaint of Hypoxic Respiratory Failure (27 Oct 2020 15:33)      SUBJECTIVE / OVERNIGHT EVENTS:    MEDICATIONS  (STANDING):  apixaban 5 milliGRAM(s) Oral every 12 hours  aspirin enteric coated 81 milliGRAM(s) Oral daily  buMETAnide Injectable 1 milliGRAM(s) IV Push daily  metoprolol tartrate 25 milliGRAM(s) Oral two times a day  piperacillin/tazobactam IVPB.. 3.375 Gram(s) IV Intermittent every 8 hours  simvastatin 40 milliGRAM(s) Oral at bedtime    MEDICATIONS  (PRN):      I&O's Summary    27 Oct 2020 07:01  -  28 Oct 2020 07:00  --------------------------------------------------------  IN: 600 mL / OUT: 900 mL / NET: -300 mL        PHYSICAL EXAM:  Vital Signs Last 24 Hrs  T(C): 37.1 (27 Oct 2020 23:46), Max: 37.1 (27 Oct 2020 23:46)  T(F): 98.8 (27 Oct 2020 23:46), Max: 98.8 (27 Oct 2020 23:46)  HR: 92 (28 Oct 2020 06:17) (84 - 105)  BP: 149/82 (28 Oct 2020 06:17) (146/85 - 151/81)  BP(mean): --  RR: 18 (28 Oct 2020 06:17) (18 - 18)  SpO2: 98% (28 Oct 2020 06:17) (91% - 98%)    GENERAL: NAD, well-groomed, well-developed  HEAD:  Atraumatic, Normocephalic  EYES: EOMI, PERRLA, conjunctiva and sclera clear  ENMT: Moist mucous membranes  NECK: Supple, No JVD,  CHEST/LUNG: Some crackles at bilateral bases, no extra work of breathing  HEART: Regular rate and rhythm; No murmurs, rubs, or gallops  ABDOMEN: Soft, Nontender, Nondistended  NEURO: Alert & Oriented X3  EXTREMITIES: B/l pitting LE edema, wounds dressed this AM/no soak through, no calf tenderness    LABS:                        10.6   5.36  )-----------( 188      ( 27 Oct 2020 07:25 )             35.5     10-28    147<H>  |  102  |  37<H>  ----------------------------<  107<H>  3.4<L>   |  35<H>  |  1.89<H>    Ca    9.0      28 Oct 2020 06:34  Phos  2.9     10-27  Mg     1.8     10-27    TPro  6.1  /  Alb  3.0<L>  /  TBili  0.6  /  DBili  x   /  AST  15  /  ALT  17  /  AlkPhos  106  10-27               PROGRESS NOTE:   Authored by Nani Dinero MD  Pager [phone] [facility]     Patient is a 80y old  Male who presents with a chief complaint of Hypoxic Respiratory Failure (27 Oct 2020 15:33)      SUBJECTIVE / OVERNIGHT EVENTS:  Afebrile. On Bipap overnight and 2L NC this AM with plan to wean. Denies complaints. Denies chest pain.    MEDICATIONS  (STANDING):  apixaban 5 milliGRAM(s) Oral every 12 hours  aspirin enteric coated 81 milliGRAM(s) Oral daily  buMETAnide Injectable 1 milliGRAM(s) IV Push daily  metoprolol tartrate 25 milliGRAM(s) Oral two times a day  piperacillin/tazobactam IVPB.. 3.375 Gram(s) IV Intermittent every 8 hours  simvastatin 40 milliGRAM(s) Oral at bedtime    MEDICATIONS  (PRN):      I&O's Summary    27 Oct 2020 07:01  -  28 Oct 2020 07:00  --------------------------------------------------------  IN: 600 mL / OUT: 900 mL / NET: -300 mL        PHYSICAL EXAM:  Vital Signs Last 24 Hrs  T(C): 37.1 (27 Oct 2020 23:46), Max: 37.1 (27 Oct 2020 23:46)  T(F): 98.8 (27 Oct 2020 23:46), Max: 98.8 (27 Oct 2020 23:46)  HR: 92 (28 Oct 2020 06:17) (84 - 105)  BP: 149/82 (28 Oct 2020 06:17) (146/85 - 151/81)  BP(mean): --  RR: 18 (28 Oct 2020 06:17) (18 - 18)  SpO2: 98% (28 Oct 2020 06:17) (91% - 98%)    GENERAL: NAD, well-groomed, well-developed  HEAD:  Atraumatic, Normocephalic  EYES: EOMI, PERRLA, conjunctiva and sclera clear  ENMT: Moist mucous membranes  NECK: Supple, No JVD,  CHEST/LUNG: Some crackles at bilateral bases, no extra work of breathing  HEART: Regular rate and rhythm; No murmurs, rubs, or gallops  ABDOMEN: Soft, Nontender, Nondistended  NEURO: Alert & Oriented X3  EXTREMITIES: B/l pitting LE edema, wounds dressed this AM/no soak through, no calf tenderness    LABS:                        10.6   5.36  )-----------( 188      ( 27 Oct 2020 07:25 )             35.5     10-28    147<H>  |  102  |  37<H>  ----------------------------<  107<H>  3.4<L>   |  35<H>  |  1.89<H>    Ca    9.0      28 Oct 2020 06:34  Phos  2.9     10-27  Mg     1.8     10-27    TPro  6.1  /  Alb  3.0<L>  /  TBili  0.6  /  DBili  x   /  AST  15  /  ALT  17  /  AlkPhos  106  10-27

## 2020-10-28 NOTE — CONSULT NOTE ADULT - SUBJECTIVE AND OBJECTIVE BOX
Wound Surgery Consult Note:    HPI:  79 yo M pmhx HTN, afib (on eliquis), PE, HLD, CKD II, CVA, bladder CA, PVD presenting with one day of confusion. Pt has been mainly homebound over the last several weeks related to chronic LE wounds, for which he was being seen by wound team. At that time he was also prescribed lasix due to LE edema, though was not taking it as prescribed, with wound nurses being concerned of fluid overload. Then patient was seemed to be confused yesterday with AOx1 and family decided to bring him in. Patient has had no sick contacts and had no fever, chills, cough. He did complain of some mild shortness of breath. No chest pain, abdominal pain or trouble urinating. It was noted that patient was refusing to take his lasix because he did not like getting up several times in the middle of the night to urinate. At baseline patient is AOx4 and per son takes care of himself relatively well. 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     In the ED the patient was noted to be confused and hypoxic. His VBG was notable for pCO2 80's, pH 7.26. ProBNP >500 and CT Chest abdomen pelvis with pulmonary edema and pleural effusions. Pt was intubated and sedated for hypoxic/hypercapnic respiratory failure.     Request for wound care consult for bilateral lower leg edema and left lower leg wound. He is followed in the wound care center by Dr. Birmingham for regular compression therapy.     PAST MEDICAL & SURGICAL HISTORY:  Cerebrovascular accident (CVA)  Diabetes mellitus  Gouty arthritis  Bladder cancer  BPH (Benign Prostatic Hypertrophy)  Hyperlipidemia  Hypertension  Bladder cancer  Bilateral Inguinal Hernia (BIH)    REVIEW OF SYSTEMS  General: no fever or chills	  Respiratory and Thorax: no SOB  Cardiovascular:	no CP  Gastrointestinal:	 no n/v  Genitourinary:	frequency with diuretic  Musculoskeletal:	 ambulatory with walker  Neurological:	recent AMS  Hematology/Lymphatics:	 Bladder CA  Endocrine: DM  Vascular: PVD, Bilateral lower leg edema  Skin: frequent blisters	    MEDICATIONS  (STANDING):  apixaban 5 milliGRAM(s) Oral every 12 hours  aspirin enteric coated 81 milliGRAM(s) Oral daily  metoprolol tartrate 25 milliGRAM(s) Oral two times a day  sacubitril 24 mG/valsartan 26 mG 1 Tablet(s) Oral two times a day  simvastatin 40 milliGRAM(s) Oral at bedtime  MEDICATIONS  (PRN):    Allergies    No Known Allergies    Intolerances    SOCIAL HISTORY:  Denies smoking, ETOH, drugs    FAMILY HISTORY: non contributory    Vital Signs Last 24 Hrs  T(C): 36.7 (28 Oct 2020 08:00), Max: 37.1 (27 Oct 2020 23:46)  T(F): 98.1 (28 Oct 2020 08:00), Max: 98.8 (27 Oct 2020 23:46)  HR: 97 (28 Oct 2020 09:57) (84 - 105)  BP: 144/92 (28 Oct 2020 08:00) (144/92 - 151/81)  BP(mean): --  RR: 18 (28 Oct 2020 08:00) (18 - 18)  SpO2: 96% (28 Oct 2020 09:57) (91% - 98%)    Physical Exam:  General: NAD,  A&Ox3  Respiratory: no SOB on room air, productive cough  Gastrointestinal: soft NT/ND  Neurology: weakened strength & sensation grossly intact  Musculoskeletal: no contractures or deformities  Vascular: BLE edema L>R, DP/PT pulses not manually palpable, BLE equally warm, no acute ischemia noted  Skin:  Left anterior le wound - L 2cm x W 5cm xD 0.1cm - hemorrhagic crust on wound surface with small amount of serosanguinous drainage, no necrotic tissue, No odor, erythema, increased warmth, tenderness, induration, fluctuance  Bilateral lower leg with hyperpigmentation, flaky, dry skiin    LABS:  10-28    147<H>  |  102  |  37<H>  ----------------------------<  107<H>  3.4<L>   |  35<H>  |  1.89<H>    Ca    9.0      28 Oct 2020 06:34  Phos  2.9     10-27  Mg     1.8     10-27    TPro  6.1  /  Alb  3.0<L>  /  TBili  0.6  /  DBili  x   /  AST  15  /  ALT  17  /  AlkPhos  106  10-27                          10.6   5.36  )-----------( 188      ( 27 Oct 2020 07:25 )             35.5           RADIOLOGY & ADDITIONAL STUDIES:    EXAM:  DUPLEX SCAN EXT VEINS LOWER BI                        PROCEDURE DATE:  10/25/2020    INTERPRETATION:  CLINICAL INFORMATION: Shortness of breath. Bilateral lower extremity swelling.  COMPARISON: None available.  TECHNIQUE: Duplex sonography of the BILATERAL LOWER extremity veins with color and spectral Doppler, with and without compression.  FINDINGS:  There is normal compressibility of the bilateral common femoral, femoral and popliteal veins.  Doppler examination shows normal spontaneous and phasic flow.  No calf vein thrombosis is detected.  IMPRESSION:  No evidence of deep venous thrombosis in either lower extremity.

## 2020-10-28 NOTE — PROGRESS NOTE ADULT - PROBLEM SELECTOR PLAN 1
Possibly secondary to volume overload vs CAP  - Reduce diuresis to 1 mg budesonide. Recheck BMP in PM to evaluate for potential overdiuresis  - 5 day course of Zosyn for presumed CAP due to bibasilar consolidations on CT  - BIPAP at night - MERCY yesterday but improving this AM. Also hypernatremic. Will give diuresis break today and plan to resume with PO torsemide tomorrow.  - Discontinue antibiotics  - Plan for wean to room air  - Strict I&Os  - Daily weight  - Aim for net 1 L negative  - PO Lopressor 25 mg  - Follow up with outpatient cardiologist regarding prescribed medications. Appears patient not taking as prescribed.  - Start Entresto today  - TTE on Oct 25 with EF of 35-40% (compared to 65% in February)  - Cards following, appreciate recs

## 2020-10-28 NOTE — PROGRESS NOTE ADULT - PROBLEM SELECTOR PLAN 6
DVT prophylaxis - Eliquis  Diet - DASH carb controlled diet Dispo - pending diuresis. Pending cardiology eval, following up with outpatient cardiologist      PCP - Catalino Lara  Cardiology - Dr. Rodri Julian ([facility])  Vascular - Dr. Birmingham

## 2020-10-28 NOTE — PROGRESS NOTE ADULT - PROBLEM SELECTOR PLAN 2
TTE on Oct 25 with EF of 35-40%  - Reduce diuresis to 1 mg budesonide. Recheck BMP in PM to evaluate for potential overdiuresis  - PO Lopressor 25 mg  - Follow up with outpatient cardiologist regarding prescribed medications. Appears patient not taking as prescribed.  - Strict I&Os  - Daily weight  - Aim for net 1 L negative - Increase Eliquis to 5 mg BID  - Continue with metoprolol as above

## 2020-10-28 NOTE — CONSULT NOTE ADULT - ASSESSMENT
Impression:    Bilateral lower leg venous dermatitis  Bilateral lower leg edema  PVD  Left lower leg wound    Recommend:   1.) topical therapy: Left lower leg wound - cleanse with NS, pat dry, apply adaptic, cover with aquacel secure with ace wrap  2.) Multi-layer Compression wraps BLE  3.) BLE elevation  4.) Emollient therapy: Moisturize intact skin w/ SWEEN cream daily  5.) Nutrition optimization    Care as per medicine will follow w/ you  Upon discharge f/u as outpatient at [facility]ound Center [address] [place] [address] [phone]  Discussed with the clinical nurse  Thank you for this consult  Meme Ghorta, NP-C, CWOCN [id number]

## 2020-10-28 NOTE — DISCHARGE NOTE PROVIDER - NSDCCPCAREPLAN_GEN_ALL_CORE_FT
PRINCIPAL DISCHARGE DIAGNOSIS  Diagnosis: Heart failure with reduced ejection fraction  Assessment and Plan of Treatment: Heart failure with reduced ejection fraction      SECONDARY DISCHARGE DIAGNOSES  Diagnosis: Lower leg edema  Assessment and Plan of Treatment:     Diagnosis: Atrial fibrillation  Assessment and Plan of Treatment: Atrial fibrillation    Diagnosis: Hypertension  Assessment and Plan of Treatment: Hypertension     PRINCIPAL DISCHARGE DIAGNOSIS  Diagnosis: Heart failure with reduced ejection fraction  Assessment and Plan of Treatment: You have a history of heart disease. Worsening heart disease can cause shortness of breath and difficulty breathing ("respiratory failure") and this caused you to have to be intubated and then treated with medications. An echocardiogram (ultrasound of the heart) was performed when you were in the hospital and showed that the pumping function of the heart was reduced compared to previously ("reduced ejection fraction"). It is important to keep up with your medications including your "water pill" (diuretic). Please follow up with your cardiologist and primary care doctor for continued management of your health and medications.      SECONDARY DISCHARGE DIAGNOSES  Diagnosis: Lower leg edema  Assessment and Plan of Treatment: You had chronic leg swelling that contributes to leg wounds and poor healing. To help the wounds heals, it will be necessary for appropriate wound care, nutrition, and treating underlying heart disease that can contribute to leg swelling and thus poor healing ability. Please continue the wraps for your feet and continue to take your heart failure medications and follow up with the wound care center as instructed.    Diagnosis: Atrial fibrillation  Assessment and Plan of Treatment: You have atrial fibrillation which is an abnormal heart rhythm that is caused by many different reasons. It is important to take certain medications to manage the atrial fibrillation and prevent complications (such as a clot forming in the heart which can lodge in the brain and cause a stroke). Please continue to take your Coreg and Eliquis as prescribed.

## 2020-10-28 NOTE — DISCHARGE NOTE PROVIDER - NSDCFUADDAPPT_GEN_ALL_CORE_FT
Please follow up with Dr. Jacobs at [address] on 11/10/2020 at 10 am. Please follow up with Dr. Lara on 11/9/2020 at 11:30 am    Please follow up with Dr. Jacobs at [address] on 11/10/2020 at 10 am.

## 2020-10-29 LAB
ANION GAP SERPL CALC-SCNC: 9 MMOL/L — SIGNIFICANT CHANGE UP (ref 5–17)
BUN SERPL-MCNC: 29 MG/DL — HIGH (ref 7–23)
CALCIUM SERPL-MCNC: 9 MG/DL — SIGNIFICANT CHANGE UP (ref 8.4–10.5)
CHLORIDE SERPL-SCNC: 103 MMOL/L — SIGNIFICANT CHANGE UP (ref 96–108)
CO2 SERPL-SCNC: 32 MMOL/L — HIGH (ref 22–31)
CREAT SERPL-MCNC: 1.57 MG/DL — HIGH (ref 0.5–1.3)
CULTURE RESULTS: SIGNIFICANT CHANGE UP
CULTURE RESULTS: SIGNIFICANT CHANGE UP
GLUCOSE SERPL-MCNC: 111 MG/DL — HIGH (ref 70–99)
MAGNESIUM SERPL-MCNC: 2 MG/DL — SIGNIFICANT CHANGE UP (ref 1.6–2.6)
PHOSPHATE SERPL-MCNC: 3 MG/DL — SIGNIFICANT CHANGE UP (ref 2.5–4.5)
POTASSIUM SERPL-MCNC: 3.8 MMOL/L — SIGNIFICANT CHANGE UP (ref 3.5–5.3)
POTASSIUM SERPL-SCNC: 3.8 MMOL/L — SIGNIFICANT CHANGE UP (ref 3.5–5.3)
SODIUM SERPL-SCNC: 144 MMOL/L — SIGNIFICANT CHANGE UP (ref 135–145)
SPECIMEN SOURCE: SIGNIFICANT CHANGE UP
SPECIMEN SOURCE: SIGNIFICANT CHANGE UP

## 2020-10-29 PROCEDURE — 99233 SBSQ HOSP IP/OBS HIGH 50: CPT

## 2020-10-29 PROCEDURE — 99233 SBSQ HOSP IP/OBS HIGH 50: CPT | Mod: GC

## 2020-10-29 RX ORDER — SACUBITRIL AND VALSARTAN 24; 26 MG/1; MG/1
1 TABLET, FILM COATED ORAL
Qty: 60 | Refills: 0
Start: 2020-10-29 | End: 2020-11-27

## 2020-10-29 RX ORDER — BUMETANIDE 0.25 MG/ML
1 INJECTION INTRAMUSCULAR; INTRAVENOUS ONCE
Refills: 0 | Status: COMPLETED | OUTPATIENT
Start: 2020-10-29 | End: 2020-10-29

## 2020-10-29 RX ORDER — BUMETANIDE 0.25 MG/ML
2 INJECTION INTRAMUSCULAR; INTRAVENOUS DAILY
Refills: 0 | Status: DISCONTINUED | OUTPATIENT
Start: 2020-10-29 | End: 2020-10-30

## 2020-10-29 RX ORDER — METOPROLOL TARTRATE 50 MG
50 TABLET ORAL
Refills: 0 | Status: DISCONTINUED | OUTPATIENT
Start: 2020-10-29 | End: 2020-10-30

## 2020-10-29 RX ADMIN — BUMETANIDE 2 MILLIGRAM(S): 0.25 INJECTION INTRAMUSCULAR; INTRAVENOUS at 17:24

## 2020-10-29 RX ADMIN — Medication 25 MILLIGRAM(S): at 05:44

## 2020-10-29 RX ADMIN — BUMETANIDE 1 MILLIGRAM(S): 0.25 INJECTION INTRAMUSCULAR; INTRAVENOUS at 11:36

## 2020-10-29 RX ADMIN — APIXABAN 5 MILLIGRAM(S): 2.5 TABLET, FILM COATED ORAL at 17:23

## 2020-10-29 RX ADMIN — Medication 81 MILLIGRAM(S): at 11:37

## 2020-10-29 RX ADMIN — APIXABAN 5 MILLIGRAM(S): 2.5 TABLET, FILM COATED ORAL at 05:44

## 2020-10-29 RX ADMIN — SIMVASTATIN 40 MILLIGRAM(S): 20 TABLET, FILM COATED ORAL at 21:27

## 2020-10-29 RX ADMIN — SACUBITRIL AND VALSARTAN 1 TABLET(S): 24; 26 TABLET, FILM COATED ORAL at 05:44

## 2020-10-29 RX ADMIN — Medication 50 MILLIGRAM(S): at 17:24

## 2020-10-29 NOTE — PROGRESS NOTE ADULT - PROBLEM SELECTOR PLAN 6
Dispo - pending diuresis. Pending cardiology eval, following up with outpatient cardiologist      PCP - Catalino Lara  Cardiology - Dr. Rodri Julian ([facility])  Vascular - Dr. Birmingham Dispo - pending diuresis and medical management. Anticipate rehab    PCP - Catalino Lara  Cardiology - Dr. Rodri Julian ([facility])  Vascular - Dr. Birmingham

## 2020-10-29 NOTE — PROGRESS NOTE ADULT - PROBLEM SELECTOR PLAN 1
- MERCY yesterday but improving this AM. Also hypernatremic. Will give diuresis break today and plan to resume with PO torsemide tomorrow.  - Discontinue antibiotics  - Plan for wean to room air  - Strict I&Os  - Daily weight  - Aim for net 1 L negative  - PO Lopressor 25 mg  - Follow up with outpatient cardiologist regarding prescribed medications. Appears patient not taking as prescribed.  - Start Entresto today  - TTE on Oct 25 with EF of 35-40% (compared to 65% in February)  - Cards following, appreciate recs - MERCY resolved after diuresis break. resume diuresis today.  - Plan for wean to room air  - Strict I&Os  - Daily weight  - Aim for net 1 L negative  - Increase PO Lopressor to 50 mg BID (10/29)  - Follow up with outpatient cardiologist regarding prescribed medications. Appears patient not taking as prescribed.  - switch to Losartan today (from entresto due to no financial coverage)  - TTE on Oct 25 with EF of 35-40% (compared to 65% in February)  - Cards following, appreciate coretta

## 2020-10-29 NOTE — PROGRESS NOTE ADULT - SUBJECTIVE AND OBJECTIVE BOX
Patient seen and evaluated at bedside. No acute complaints. Resting comfortably in bed, denies CP, palpitations, SOB, cough, abdominal pain, worsening LE edema, N/V.      Current Medications:   apixaban 5 milliGRAM(s) Oral every 12 hours  aspirin enteric coated 81 milliGRAM(s) Oral daily  buMETAnide 2 milliGRAM(s) Oral daily  metoprolol tartrate 50 milliGRAM(s) Oral two times a day  sacubitril 24 mG/valsartan 26 mG 1 Tablet(s) Oral two times a day  simvastatin 40 milliGRAM(s) Oral at bedtime      Review of Systems:  REVIEW OF SYSTEMS:  CONSTITUTIONAL: No weakness, fevers or chills  EYES/ENT: No visual changes;  NECK: No pain or stiffness  RESPIRATORY: No cough, wheezing, hemoptysis; No shortness of breath  CARDIOVASCULAR: No chest pain or palpitations;   GASTROINTESTINAL: No abdominal or epigastric pain. No nausea, vomiting, or hematemesis; No diarrhea or constipation. No melena or hematochezia.  BACK: No back pain  GENITOURINARY: No dysuria, frequency or hematuria  NEUROLOGICAL: No numbness or weakness  All other review of systems is negative unless indicated above.    Physical Exam:  T(F): 98.5 (10-29), Max: 98.5 (10-28)  HR: 93 (10-29) (84 - 98)  BP: 128/76 (10-29) (128/76 - 155/93)  RR: 18 (10-29)  SpO2: 93% (10-29)  GENERAL: No acute distress  HEAD:  Atraumatic, Normocephalic  ENT: EOMI. Neck supple.  CHEST/LUNG: No wheeze, equal breath sounds bilaterally. Decreased at the bases b/l.    HEART: Regular rate and rhythm; No murmurs, rubs, or gallops  ABDOMEN: Soft, Nontender, Nondistended; Bowel sounds present  EXTREMITIES:  LE wounds wrapped in ace bandages   PSYCH: Nl behavior, nl affect  NEUROLOGY: AAOx3  SKIN: Normal color, No rashes or lesions  LINES:      Imaging:    CXR: Personally reviewed    Labs: Personally reviewed    10-29    144  |  103  |  29<H>  ----------------------------<  111<H>  3.8   |  32<H>  |  1.57<H>    Ca    9.0      29 Oct 2020 06:51  Phos  3.0     10-29  Mg     2.0     10-29            Serum Pro-Brain Natriuretic Peptide: 5699 pg/mL (10-24 @ 12:21)        Thyroid Stimulating Hormone, Serum: 1.20 uIU/mL (10-24 @ 23:50)

## 2020-10-29 NOTE — PROGRESS NOTE ADULT - PROBLEM SELECTOR PLAN 3
PO Lopressor 25 mg  - Start Entresto today PO Lopressor 50 mg  - switch to Losartan today (from entresto due to no financial coverage)

## 2020-10-29 NOTE — PROGRESS NOTE ADULT - ASSESSMENT
80 year old man with history of chronic renal insufficiency, atrial fibrillation on apixaban, HF with preserved EF, prior CVA (cerebellar) and chronic venous insufficiency who presented with altered mental status and found to have pleural effusions and infiltrates on chest CT. He was intubated in the ER. Now extubated and mental status back at baseline, breathing much improved with diuretics. Cardiology consulted for newly reduced EF by echo (35-40%) on 10/25. Hs Smita negative x2. BNP on admission ~ 5700. ECG with more prominent counterclockwise rotation but overall without significant changes from prior ECG from 2019. On prior echo here in 2019 (done for edema) LV was poorly visualized, but reported LVEF was normal.     #Acute congestive HFrEF, likely secondary to hypertensive heart disease   - C/w torsemide 20mg daily   - C/w Eliquis 5mg BID  - C/w Lopressor 25mg BID and Simvastatin 40mg daily  - C/w Entresto 24/26 BID  - Strict I/Os and low salt diet, fluid restriction  - Electrolyte replacement as needed        80 year old man with history of chronic renal insufficiency, atrial fibrillation on apixaban, HF with preserved EF, prior CVA (cerebellar) and chronic venous insufficiency who presented with altered mental status and found to have pleural effusions and infiltrates on chest CT. He was intubated in the ER. Now extubated and mental status back at baseline, breathing much improved with diuretics. Cardiology consulted for newly reduced EF by echo (35-40%) on 10/25. Hs Smita negative x2. BNP on admission ~ 5700. ECG with more prominent counterclockwise rotation but overall without significant changes from prior ECG from 2019. On prior echo here in 2019 (done for edema) LV was poorly visualized, but reported LVEF was normal.     #Acute congestive HFrEF, likely secondary to hypertensive heart disease   - continue diuresis, with PO bumex  - C/w Eliquis 5mg BID  - C/w Lopressor 25mg BID and Simvastatin 40mg daily  - C/w Entresto 24/26 BID or alternative ARB  - Strict I/Os and low salt diet, fluid restriction  - Electrolyte replacement as needed   - may switch Lopressor to carvedilol 12.5 BID if better BP control needed

## 2020-10-29 NOTE — PROGRESS NOTE ADULT - PROBLEM SELECTOR PLAN 2
- Increase Eliquis to 5 mg BID  - Continue with metoprolol as above - Eliquis 5 mg BID  - Continue with metoprolol as above

## 2020-10-29 NOTE — PROGRESS NOTE ADULT - ATTENDING COMMENTS
Patient seen and examined, labs and vitals are reviewed. Chart reviewed   81Y/O/M PMHx of Chronic A.Fib on Eliquis, PE, Cerebellar CVA, Bladder Ca, BPH, CKD2, PVD initially admitted to MICU with Altered Mental Status, and acute Hypoxic Hypercarbic Respiratory Failure secondary Acute systolic heart failure was on IV Bumex and with contraction alkalosis.  still with crackles   resume bumex   continue AC , BB and Entresto  monitor BMP  ACE bandage.  PT

## 2020-10-29 NOTE — PROGRESS NOTE ADULT - SUBJECTIVE AND OBJECTIVE BOX
PROGRESS NOTE:   Authored by Nani Dinero MD  Pager [phone] [facility]     Patient is a 80y old  Male who presents with a chief complaint of Hypoxic Respiratory Failure (28 Oct 2020 23:04)      SUBJECTIVE / OVERNIGHT EVENTS:    MEDICATIONS  (STANDING):  apixaban 5 milliGRAM(s) Oral every 12 hours  aspirin enteric coated 81 milliGRAM(s) Oral daily  metoprolol tartrate 25 milliGRAM(s) Oral two times a day  sacubitril 24 mG/valsartan 26 mG 1 Tablet(s) Oral two times a day  simvastatin 40 milliGRAM(s) Oral at bedtime    MEDICATIONS  (PRN):      I&O's Summary    28 Oct 2020 07:01  -  29 Oct 2020 07:00  --------------------------------------------------------  IN: 0 mL / OUT: 50 mL / NET: -50 mL        PHYSICAL EXAM:  Vital Signs Last 24 Hrs  T(C): 36.8 (29 Oct 2020 05:40), Max: 36.9 (28 Oct 2020 16:19)  T(F): 98.2 (29 Oct 2020 05:40), Max: 98.5 (28 Oct 2020 19:40)  HR: 89 (29 Oct 2020 05:40) (87 - 98)  BP: 136/87 (29 Oct 2020 05:40) (136/87 - 155/93)  BP(mean): --  RR: 16 (29 Oct 2020 05:40) (16 - 18)  SpO2: 96% (29 Oct 2020 05:40) (95% - 98%)        LABS:                        10.6   5.36  )-----------( 188      ( 27 Oct 2020 07:25 )             35.5     10-28    147<H>  |  102  |  37<H>  ----------------------------<  107<H>  3.4<L>   |  35<H>  |  1.89<H>    Ca    9.0      28 Oct 2020 06:34  Phos  2.9     10-27  Mg     1.8     10-27    TPro  6.1  /  Alb  3.0<L>  /  TBili  0.6  /  DBili  x   /  AST  15  /  ALT  17  /  AlkPhos  106  10-27               PROGRESS NOTE:   Authored by Nani Dinero MD  Pager [phone] [facility]     Patient is a 80y old  Male who presents with a chief complaint of Hypoxic Respiratory Failure (28 Oct 2020 23:04)      SUBJECTIVE / OVERNIGHT EVENTS:  Afebrile. 5 beats of wide complex tachycardia overnight. Agreeable to rehab. Was off NC during some part of day yesterday and off BIPAP, but back on NC for comfort this AM.     MEDICATIONS  (STANDING):  apixaban 5 milliGRAM(s) Oral every 12 hours  aspirin enteric coated 81 milliGRAM(s) Oral daily  metoprolol tartrate 25 milliGRAM(s) Oral two times a day  sacubitril 24 mG/valsartan 26 mG 1 Tablet(s) Oral two times a day  simvastatin 40 milliGRAM(s) Oral at bedtime    MEDICATIONS  (PRN):      I&O's Summary    28 Oct 2020 07:01  -  29 Oct 2020 07:00  --------------------------------------------------------  IN: 0 mL / OUT: 50 mL / NET: -50 mL        PHYSICAL EXAM:  Vital Signs Last 24 Hrs  T(C): 36.8 (29 Oct 2020 05:40), Max: 36.9 (28 Oct 2020 16:19)  T(F): 98.2 (29 Oct 2020 05:40), Max: 98.5 (28 Oct 2020 19:40)  HR: 89 (29 Oct 2020 05:40) (87 - 98)  BP: 136/87 (29 Oct 2020 05:40) (136/87 - 155/93)  BP(mean): --  RR: 16 (29 Oct 2020 05:40) (16 - 18)  SpO2: 96% (29 Oct 2020 05:40) (95% - 98%)    GENERAL: NAD, well-groomed, well-developed  HEAD:  Atraumatic, Normocephalic  EYES: EOMI, conjunctiva and sclera clear  ENMT: Moist mucous membranes  NECK: Supple, JVD  CHEST/LUNG: Some crackles at bilateral bases, no extra work of breathing  HEART: Regular rate and rhythm; No murmurs, rubs, or gallops  ABDOMEN: Soft, Nontender, Nondistended  NEURO: Alert & Oriented X3  EXTREMITIES: B/l pitting LE edema, wounds dressed this AM/no soak through, no calf tenderness    LABS:                        10.6   5.36  )-----------( 188      ( 27 Oct 2020 07:25 )             35.5     10-28    147<H>  |  102  |  37<H>  ----------------------------<  107<H>  3.4<L>   |  35<H>  |  1.89<H>    Ca    9.0      28 Oct 2020 06:34  Phos  2.9     10-27  Mg     1.8     10-27    TPro  6.1  /  Alb  3.0<L>  /  TBili  0.6  /  DBili  x   /  AST  15  /  ALT  17  /  AlkPhos  106  10-27

## 2020-10-30 ENCOUNTER — APPOINTMENT (OUTPATIENT)
Dept: WOUND CARE | Facility: CLINIC | Age: 80
End: 2020-10-30

## 2020-10-30 LAB
ANION GAP SERPL CALC-SCNC: 10 MMOL/L — SIGNIFICANT CHANGE UP (ref 5–17)
BUN SERPL-MCNC: 22 MG/DL — SIGNIFICANT CHANGE UP (ref 7–23)
CALCIUM SERPL-MCNC: 8.9 MG/DL — SIGNIFICANT CHANGE UP (ref 8.4–10.5)
CHLORIDE SERPL-SCNC: 101 MMOL/L — SIGNIFICANT CHANGE UP (ref 96–108)
CO2 SERPL-SCNC: 35 MMOL/L — HIGH (ref 22–31)
CREAT SERPL-MCNC: 1.51 MG/DL — HIGH (ref 0.5–1.3)
GLUCOSE SERPL-MCNC: 112 MG/DL — HIGH (ref 70–99)
MAGNESIUM SERPL-MCNC: 1.8 MG/DL — SIGNIFICANT CHANGE UP (ref 1.6–2.6)
PHOSPHATE SERPL-MCNC: 3.1 MG/DL — SIGNIFICANT CHANGE UP (ref 2.5–4.5)
POTASSIUM SERPL-MCNC: 3.4 MMOL/L — LOW (ref 3.5–5.3)
POTASSIUM SERPL-SCNC: 3.4 MMOL/L — LOW (ref 3.5–5.3)
SODIUM SERPL-SCNC: 146 MMOL/L — HIGH (ref 135–145)

## 2020-10-30 PROCEDURE — 99233 SBSQ HOSP IP/OBS HIGH 50: CPT

## 2020-10-30 PROCEDURE — 99233 SBSQ HOSP IP/OBS HIGH 50: CPT | Mod: GC

## 2020-10-30 RX ORDER — POTASSIUM CHLORIDE 20 MEQ
20 PACKET (EA) ORAL
Refills: 0 | Status: COMPLETED | OUTPATIENT
Start: 2020-10-30 | End: 2020-10-30

## 2020-10-30 RX ORDER — MAGNESIUM SULFATE 500 MG/ML
2 VIAL (ML) INJECTION ONCE
Refills: 0 | Status: COMPLETED | OUTPATIENT
Start: 2020-10-30 | End: 2020-10-30

## 2020-10-30 RX ORDER — CARVEDILOL PHOSPHATE 80 MG/1
12.5 CAPSULE, EXTENDED RELEASE ORAL EVERY 12 HOURS
Refills: 0 | Status: DISCONTINUED | OUTPATIENT
Start: 2020-10-30 | End: 2020-11-02

## 2020-10-30 RX ORDER — LOSARTAN POTASSIUM 100 MG/1
100 TABLET, FILM COATED ORAL DAILY
Refills: 0 | Status: DISCONTINUED | OUTPATIENT
Start: 2020-10-30 | End: 2020-10-30

## 2020-10-30 RX ORDER — BUMETANIDE 0.25 MG/ML
2 INJECTION INTRAMUSCULAR; INTRAVENOUS ONCE
Refills: 0 | Status: COMPLETED | OUTPATIENT
Start: 2020-10-30 | End: 2020-10-30

## 2020-10-30 RX ORDER — LOSARTAN POTASSIUM 100 MG/1
50 TABLET, FILM COATED ORAL DAILY
Refills: 0 | Status: DISCONTINUED | OUTPATIENT
Start: 2020-10-30 | End: 2020-11-02

## 2020-10-30 RX ADMIN — CARVEDILOL PHOSPHATE 12.5 MILLIGRAM(S): 80 CAPSULE, EXTENDED RELEASE ORAL at 17:30

## 2020-10-30 RX ADMIN — Medication 50 GRAM(S): at 13:44

## 2020-10-30 RX ADMIN — Medication 81 MILLIGRAM(S): at 13:42

## 2020-10-30 RX ADMIN — Medication 50 MILLIGRAM(S): at 05:31

## 2020-10-30 RX ADMIN — Medication 20 MILLIEQUIVALENT(S): at 13:43

## 2020-10-30 RX ADMIN — Medication 20 MILLIEQUIVALENT(S): at 17:27

## 2020-10-30 RX ADMIN — Medication 20 MILLIEQUIVALENT(S): at 10:55

## 2020-10-30 RX ADMIN — LOSARTAN POTASSIUM 50 MILLIGRAM(S): 100 TABLET, FILM COATED ORAL at 13:43

## 2020-10-30 RX ADMIN — SIMVASTATIN 40 MILLIGRAM(S): 20 TABLET, FILM COATED ORAL at 21:49

## 2020-10-30 RX ADMIN — APIXABAN 5 MILLIGRAM(S): 2.5 TABLET, FILM COATED ORAL at 05:31

## 2020-10-30 RX ADMIN — APIXABAN 5 MILLIGRAM(S): 2.5 TABLET, FILM COATED ORAL at 17:27

## 2020-10-30 RX ADMIN — BUMETANIDE 2 MILLIGRAM(S): 0.25 INJECTION INTRAMUSCULAR; INTRAVENOUS at 17:27

## 2020-10-30 RX ADMIN — BUMETANIDE 2 MILLIGRAM(S): 0.25 INJECTION INTRAMUSCULAR; INTRAVENOUS at 05:31

## 2020-10-30 NOTE — PROGRESS NOTE ADULT - PROBLEM SELECTOR PLAN 1
- [facility] resolved after diuresis break. resume diuresis today.  - Plan for wean to room air  - Strict I&Os  - Daily weight  - Aim for net 1 L negative  - Increase PO Lopressor to 50 mg BID (10/29)  - Follow up with outpatient cardiologist regarding prescribed medications. Appears patient not taking as prescribed.  - switch to Losartan today (from entresto due to no financial coverage)  - TTE on Oct 25 with EF of 35-40% (compared to 65% in February)  - Cards following, appreciate coretta - [facility] resolved after diuresis break. resume diuresis today.  - Plan for wean to room air  - Strict I&Os  - Daily weight  - Aim for net 1 L negative  - will Increase PO Lopressor to 100 mg this evening and monitor for tolerance  - Follow up with outpatient cardiologist regarding prescribed medications. Appears patient not taking as prescribed.  - switch to Losartan today (from entresto due to no financial coverage)  - TTE on Oct 25 with EF of 35-40% (compared to 65% in February)  - Cards following, appreciate coretta

## 2020-10-30 NOTE — DIETITIAN INITIAL EVALUATION ADULT. - PHYSCIAL ASSESSMENT
well nourished Skin: no pressure injuries   Pt agreeable to nutrition focused physical exam, pt c age related changes at this time.

## 2020-10-30 NOTE — DIETITIAN INITIAL EVALUATION ADULT. - REASON FOR ADMISSION
Hypoxic Respiratory Failure; noted pt S/P MICU admission; pt c HF, extensive past medical history noted; pt S/P MBS, recommended for regular diet c thin liquids.

## 2020-10-30 NOTE — PROGRESS NOTE ADULT - PROBLEM SELECTOR PLAN 6
Dispo - pending diuresis and medical management. Anticipate rehab    PCP - Catalino Lara  Cardiology - Dr. Rodri Julian ([facility])  Vascular - Dr. [place]

## 2020-10-30 NOTE — PROGRESS NOTE ADULT - SUBJECTIVE AND OBJECTIVE BOX
Patient seen and evaluated at bedside. No overnight events. Patient has no acute complaints. Denies CP, palpitations, SOB, orthopnea, fevers/chills, N/V/D/C.     Current Medications:   apixaban 5 milliGRAM(s) Oral every 12 hours  aspirin enteric coated 81 milliGRAM(s) Oral daily  buMETAnide 2 milliGRAM(s) Oral daily  losartan 50 milliGRAM(s) Oral daily  magnesium sulfate  IVPB 2 Gram(s) IV Intermittent once  metoprolol tartrate 50 milliGRAM(s) Oral two times a day  potassium chloride    Tablet ER 20 milliEquivalent(s) Oral every 2 hours  simvastatin 40 milliGRAM(s) Oral at bedtime    Review of Systems:  REVIEW OF SYSTEMS:  CONSTITUTIONAL: No weakness, fevers or chills  EYES/ENT: No visual changes;    NECK: No pain or stiffness  RESPIRATORY: No cough, wheezing, hemoptysis; No shortness of breath  CARDIOVASCULAR: No chest pain or palpitations;   GASTROINTESTINAL: No abdominal or epigastric pain. No nausea, vomiting, or hematemesis; No diarrhea or constipation. No melena or hematochezia.  BACK: No back pain  GENITOURINARY: No dysuria, frequency or hematuria  NEUROLOGICAL: No numbness or weakness  All other review of systems is negative unless indicated above.    Physical Exam:  T(F): 98.3 (10-30), Max: 98.5 (10-30)  HR: 84 (10-30) (78 - 100)  BP: 146/95 (10-30) (122/81 - 147/90)  RR: 18 (10-30)  SpO2: 97% (10-30)  GENERAL: No acute distress, well-developed  HEAD:  Atraumatic, Normocephalic  ENT: EOMI; No JVD, moist mucosa  CHEST/LUNG: Decreased breath sounds at bases b/l; No wheezing. Normal respiratory effort.    HEART: Regular rate and rhythm; No murmurs, rubs, or gallops  ABDOMEN: Soft, Nontender; Bowel sounds present  EXTREMITIES:  B/L LE wrapped in ace bandages.   PSYCH: Nl behavior, nl affect  NEUROLOGY: AAOx3  SKIN: Normal color, No rashes or lesions  LINES:    Cardiovascular Diagnostic Testing:    ECG: Personally reviewed    Echo: Personally reviewed    Imaging:    CXR: Personally reviewed    Labs: Personally reviewed    10-30    146<H>  |  101  |  22  ----------------------------<  112<H>  3.4<L>   |  35<H>  |  1.51<H>    Ca    8.9      30 Oct 2020 06:32  Phos  3.1     10-30  Mg     1.8     10-30            Serum Pro-Brain Natriuretic Peptide: 5699 pg/mL (10-24 @ 12:21)        Thyroid Stimulating Hormone, Serum: 1.20 uIU/mL (10-24 @ 23:50)   Patient seen and evaluated at bedside. No overnight events. Patient has no acute complaints. Denies CP, palpitations, SOB, orthopnea, fevers/chills, N/V/D/C.     Current Medications:   apixaban 5 milliGRAM(s) Oral every 12 hours  aspirin enteric coated 81 milliGRAM(s) Oral daily  buMETAnide 2 milliGRAM(s) Oral daily  losartan 50 milliGRAM(s) Oral daily  magnesium sulfate  IVPB 2 Gram(s) IV Intermittent once  metoprolol tartrate 50 milliGRAM(s) Oral two times a day  potassium chloride    Tablet ER 20 milliEquivalent(s) Oral every 2 hours  simvastatin 40 milliGRAM(s) Oral at bedtime    Review of Systems:  REVIEW OF SYSTEMS:  CONSTITUTIONAL: No weakness, fevers or chills  EYES/ENT: No visual changes;    NECK: No pain or stiffness  RESPIRATORY: No cough, wheezing, hemoptysis; No shortness of breath  CARDIOVASCULAR: No chest pain or palpitations;   GASTROINTESTINAL: No abdominal or epigastric pain. No nausea, vomiting, or hematemesis; No diarrhea or constipation. No melena or hematochezia.  BACK: No back pain  GENITOURINARY: No dysuria, frequency or hematuria  NEUROLOGICAL: No numbness or weakness  All other review of systems is negative unless indicated above.    Physical Exam:  T(F): 98.3 (10-30), Max: 98.5 (10-30)  HR: 84 (10-30) (78 - 100)  BP: 146/95 (10-30) (122/81 - 147/90)  RR: 18 (10-30)  SpO2: 97% (10-30)  GENERAL: No acute distress, well-developed  HEAD:  Atraumatic, Normocephalic  ENT: EOMI; No JVD, moist mucosa  CHEST/LUNG: Decreased breath sounds at bases b/l; No wheezing. Normal respiratory effort.    HEART: Regular rate and rhythm; No murmurs, rubs, or gallops  ABDOMEN: Soft, Nontender; Bowel sounds present  EXTREMITIES:  B/L LE wrapped in ace bandages.   PSYCH: Nl behavior, nl affect  NEUROLOGY: AAOx3  SKIN: Normal color, No rashes or lesions  LINES:    Cardiovascular Diagnostic Testing:    ECG: Personally reviewed    Echo: Personally reviewed    Imaging:    CXR: Personally reviewed    Labs: Personally reviewed    10-30    146<H>  |  101  |  22  ----------------------------<  112<H>  3.4<L>   |  35<H>  |  1.51<H>    Ca    8.9      30 Oct 2020 06:32  Phos  3.1     10-30  Mg     1.8     10-30    Serum Pro-Brain Natriuretic Peptide: 5699 pg/mL (10-24 @ 12:21)    Thyroid Stimulating Hormone, Serum: 1.20 uIU/mL (10-24 @ 23:50)

## 2020-10-30 NOTE — PROGRESS NOTE ADULT - PROBLEM SELECTOR PLAN 6
Dispo - pending diuresis and medical management. Anticipate rehab    PCP - Catalino Lara  Cardiology - Dr. Rodri Julian ([facility])  Vascular - Dr. Birmingham

## 2020-10-30 NOTE — DIETITIAN INITIAL EVALUATION ADULT. - PERTINENT MEDS FT
MEDICATIONS  (STANDING):  apixaban 5 milliGRAM(s) Oral every 12 hours  aspirin enteric coated 81 milliGRAM(s) Oral daily  buMETAnide 2 milliGRAM(s) Oral daily  losartan 50 milliGRAM(s) Oral daily  magnesium sulfate  IVPB 2 Gram(s) IV Intermittent once  metoprolol tartrate 50 milliGRAM(s) Oral two times a day  potassium chloride    Tablet ER 20 milliEquivalent(s) Oral every 2 hours  simvastatin 40 milliGRAM(s) Oral at bedtime    MEDICATIONS  (PRN):

## 2020-10-30 NOTE — PROGRESS NOTE ADULT - ATTENDING COMMENTS
Patient seen and examined, labs and vitals are reviewed. Chart reviewed   81 Y/O/M PMHx of Chronic A.Fib on Eliquis, PE, Cerebellar CVA, Bladder Ca, BPH, CKD2, PVD initially admitted to MICU with Altered Mental Status, and acute Hypoxic Hypercarbic Respiratory Failure secondary acute systolic heart failure  still with crackles   continue PO  Bumex give additional 1 mg IV   mild hypernatremia as he is drinking only 1 L free water   continue AC , Coreg and Losartan   monitor BMP  ACE bandage.  PT

## 2020-10-30 NOTE — DIETITIAN INITIAL EVALUATION ADULT. - ADD RECOMMEND
1. Recommend Ensure pudding x 1 daily-noted h/o DM, seems to be diet controlled and at this time would want to encourage adequate intake. 2. Consider multivitamin and vitamin C supplementation to aid in wound healing. 3. Reviewed importance of continuing to avoid high sodium foods and addition of salt to meals, monitoring fluid intake and monitoring wt closely. 1. Recommend Ensure pudding x 1 daily-noted h/o DM, seems to be diet controlled and at this time would want to encourage adequate intake; discussed c Dr. Dinero, order pending verification placed. 2. Consider multivitamin and vitamin C supplementation to aid in wound healing. 3. Reviewed importance of continuing to avoid high sodium foods and addition of salt to meals, monitoring fluid intake and monitoring wt closely.

## 2020-10-30 NOTE — DIETITIAN INITIAL EVALUATION ADULT. - OTHER INFO
Pt reports he thought he weighed about 186 pounds but he does not weigh himself daily at home. Noted admit wt of 196 pounds and most recent wt of 175.9 pounds, this is after diuresis. Pt reports he was usually drinking about 8 cups of water per day and some milk as well. Reports his Wound Care visiting RN had told him that was likely too much fluid for him and contributed to fluid retention. Pt reports in house he has been advised by Cardiology to take about 24 ounces of fluid at this time and he has been adherent. Noted pt was also non-adherent to taking Lasix at home due to urination over night. Reports he is very happy with his legs being closer to normal and understands importance of checking wt and monitoring intake.     Pt agreeable to Ensure pudding to provide additional protein to aid in wound healing and agreeable to multivitamin and vitamin C supplementation as well.

## 2020-10-30 NOTE — DIETITIAN INITIAL EVALUATION ADULT. - FACTORS AFF FOOD INTAKE
in house pt reports he has been eating well when he receives foods he likes to eat, reports he was able to consume all of his American toast and milk this morning; as per recall in house, pt with varying intake-at times 50% and other times 75%; denies any chewing/swallowing issues; reports last BM was earlier today

## 2020-10-30 NOTE — PROGRESS NOTE ADULT - ATTENDING COMMENTS
Responding to diuretics. Continue titration of GDMT for non-ischemic cardiomyopathy with reduced EF.

## 2020-10-30 NOTE — DIETITIAN INITIAL EVALUATION ADULT. - PERTINENT LABORATORY DATA
10/27: A1C 6.6%-pt reports no h/o DM, reports he is not on any medication at home for DM and has been taking cinnamon at home to keep his blood sugar low; 10/30: sodium 146, potassium 3.4, glucose 112, Cr 1.51

## 2020-10-30 NOTE — PROGRESS NOTE ADULT - PROBLEM SELECTOR PLAN 3
PO Lopressor 50 mg  - switch to Losartan today (from entresto due to no financial coverage) PO Lopressor 50 mg  - switch to Losartan (from entresto due to no financial coverage)

## 2020-10-30 NOTE — PROGRESS NOTE ADULT - SUBJECTIVE AND OBJECTIVE BOX
PROGRESS NOTE:   Authored by Aiden Watters, MS3    Patient is a 80y old  Male who presents with a chief complaint of Hypoxic Respiratory Failure (30 Oct 2020 11:00)      SUBJECTIVE / OVERNIGHT EVENTS:  Pt reports that he is feeling well and have no complaints. There were no over night events.     MEDICATIONS  (STANDING):  apixaban 5 milliGRAM(s) Oral every 12 hours  aspirin enteric coated 81 milliGRAM(s) Oral daily  buMETAnide Injectable 2 milliGRAM(s) IV Push once  losartan 50 milliGRAM(s) Oral daily  magnesium sulfate  IVPB 2 Gram(s) IV Intermittent once  metoprolol tartrate 50 milliGRAM(s) Oral two times a day  potassium chloride    Tablet ER 20 milliEquivalent(s) Oral every 2 hours  simvastatin 40 milliGRAM(s) Oral at bedtime    MEDICATIONS  (PRN):  buMETAnide Injectible 2 Mg IVP stop after 1 dose  Magnesium Sulfate IVPB 2 g in sterile water 50 mL  Potassium chloride 20Meq oral darrion 2 hrs stop after 3 doses.     I&O's Summary    29 Oct 2020 07:01  -  30 Oct 2020 07:00  --------------------------------------------------------  IN: 0 mL / OUT: 1400 mL / NET: -1400 mL        PHYSICAL EXAM:  Vital Signs Last 24 Hrs  T(C): 36.8 (30 Oct 2020 07:53), Max: 36.9 (30 Oct 2020 04:56)  T(F): 98.3 (30 Oct 2020 07:53), Max: 98.5 (30 Oct 2020 04:56)  HR: 84 (30 Oct 2020 07:53) (78 - 100)  BP: 146/95 (30 Oct 2020 07:53) (122/81 - 147/90)  BP(mean): --  RR: 18 (30 Oct 2020 07:53) (18 - 18)  SpO2: 97% (30 Oct 2020 07:53) (85% - 97%)        LABS:    10-30    146<H>  |  101  |  22  ----------------------------<  112<H>  3.4<L>   |  35<H>  |  1.51<H>    Ca    8.9      30 Oct 2020 06:32  Phos  3.1     10-30  Mg     1.8     10-30                 PROGRESS NOTE:   Authored by Aiden Watters, MS3    Patient is a 80y old  Male who presents with a chief complaint of Hypoxic Respiratory Failure (30 Oct 2020 11:00)      SUBJECTIVE / OVERNIGHT EVENTS:  Pt reports that he is feeling well and have no complaints. There were no over night events.     MEDICATIONS  (STANDING):  apixaban 5 milliGRAM(s) Oral every 12 hours  aspirin enteric coated 81 milliGRAM(s) Oral daily  buMETAnide Injectable 2 milliGRAM(s) IV Push once  losartan 50 milliGRAM(s) Oral daily  magnesium sulfate  IVPB 2 Gram(s) IV Intermittent once  metoprolol tartrate 50 milliGRAM(s) Oral two times a day  potassium chloride    Tablet ER 20 milliEquivalent(s) Oral every 2 hours  simvastatin 40 milliGRAM(s) Oral at bedtime    MEDICATIONS  (PRN):  buMETAnide Injectible 2 Mg IVP stop after 1 dose  Magnesium Sulfate IVPB 2 g in sterile water 50 mL  Potassium chloride 20Meq oral darrion 2 hrs stop after 3 doses.     I&O's Summary    29 Oct 2020 07:01  -  30 Oct 2020 07:00  --------------------------------------------------------  IN: 0 mL / OUT: 1400 mL / NET: -1400 mL        PHYSICAL EXAM:  Vital Signs Last 24 Hrs  T(C): 36.8 (30 Oct 2020 07:53), Max: 36.9 (30 Oct 2020 04:56)  T(F): 98.3 (30 Oct 2020 07:53), Max: 98.5 (30 Oct 2020 04:56)  HR: 84 (30 Oct 2020 07:53) (78 - 100)  BP: 146/95 (30 Oct 2020 07:53) (122/81 - 147/90)  BP(mean): --  RR: 18 (30 Oct 2020 07:53) (18 - 18)  SpO2: 97% (30 Oct 2020 07:53) (85% - 97%)    GENERAL: NAD, well-groomed, well-developed  HEAD:  Atraumatic, Normocephalic  NECK: Supple  CHEST/LUNG: Crackles at bilateral bases, no extra work of breathing  HEART: Irregularly irregular; No murmurs, rubs, or gallops  ABDOMEN: Soft, Nontender, Nondistended  NEURO: Alert & Oriented X3  EXTREMITIES: B/l pitting LE edema, no calf tenderness, bilateral LE ACE wrapped    LABS:    10-30    146<H>  |  101  |  22  ----------------------------<  112<H>  3.4<L>   |  35<H>  |  1.51<H>    Ca    8.9      30 Oct 2020 06:32  Phos  3.1     10-30  Mg     1.8     10-30

## 2020-10-30 NOTE — DIETITIAN INITIAL EVALUATION ADULT. - ORAL INTAKE PTA/DIET HISTORY
Pt reports he was usually consuming 3 meals per day, diet recall obtained, consumes a variety of fruits, vegetables, mostly chicken and fish, various grains. States diet is low in fat, does not use salt at the table or in cooking process; reports in general he is a small eater. Reports NKFA. States he was taking omega 3 fatty acids at home.

## 2020-10-30 NOTE — PROGRESS NOTE ADULT - PROBLEM SELECTOR PLAN 1
- [facility] resolved after diuresis break. resume diuresis today.  - Plan for wean to room air  - Strict I&Os  - Daily weight  - Aim for net 1 L negative  - Increase PO Lopressor to 50 mg BID (10/29)  - Follow up with outpatient cardiologist regarding prescribed medications. Appears patient not taking as prescribed.  - switch to Losartan today (from entresto due to no financial coverage)  - TTE on Oct 25 with EF of 35-40% (compared to 65% in February)  - Cards following, appreciate coretta - Continue diuresis  - Plan for wean to room air  - Strict I&Os  - Daily weight  - Aim for net 1-2 L negative  - Increase PO Lopressor to 50 mg BID (10/29)  - Follow up with outpatient cardiologist regarding prescribed medications. Appears patient not taking as prescribed.  - switch to Losartan (from entresto due to no financial coverage)  - TTE on Oct 25 with EF of 35-40% (compared to 65% in February)  - Cards following, appreciate recs

## 2020-10-30 NOTE — PROGRESS NOTE ADULT - ASSESSMENT
80 year old man with history of chronic renal insufficiency, atrial fibrillation on apixaban, HF with preserved EF, prior CVA (cerebellar) and chronic venous insufficiency who presented with altered mental status and found to have pleural effusions and infiltrates on chest CT. He was intubated in the ER. Now extubated and mental status back at baseline, breathing much improved with diuretics. Cardiology consulted for newly reduced EF by echo (35-40%) on 10/25. Hs Smita negative x2. BNP on admission ~ 5700. ECG with more prominent counterclockwise rotation but overall without significant changes from prior ECG from 2019. On prior echo here in 2019 (done for edema) LV was poorly visualized, but reported LVEF was normal.     #Acute congestive HFrEF, likely secondary to hypertensive heart disease   - Switch Lopressor to carvedilol 12.5 BID for better BP control   - C/w PO Bumex 2mg   - C/w Simvastatin 40mg daily  - C/w Eliquis 5mg BID  - C/w losartan 50mg   - Strict I/Os and low salt diet, fluid restriction  - Electrolyte replacement as needed

## 2020-10-30 NOTE — PROGRESS NOTE ADULT - SUBJECTIVE AND OBJECTIVE BOX
PROGRESS NOTE:   Authored by Nani Dinero MD  Pager [phone] [facility]     Patient is a 80y old  Male who presents with a chief complaint of Hypoxic Respiratory Failure (29 Oct 2020 14:18)      SUBJECTIVE / OVERNIGHT EVENTS:    MEDICATIONS  (STANDING):  apixaban 5 milliGRAM(s) Oral every 12 hours  aspirin enteric coated 81 milliGRAM(s) Oral daily  buMETAnide 2 milliGRAM(s) Oral daily  losartan 50 milliGRAM(s) Oral daily  metoprolol tartrate 50 milliGRAM(s) Oral two times a day  simvastatin 40 milliGRAM(s) Oral at bedtime    MEDICATIONS  (PRN):      I&O's Summary    29 Oct 2020 07:01  -  30 Oct 2020 07:00  --------------------------------------------------------  IN: 0 mL / OUT: 1400 mL / NET: -1400 mL        PHYSICAL EXAM:  Vital Signs Last 24 Hrs  T(C): 36.8 (30 Oct 2020 07:53), Max: 36.9 (29 Oct 2020 08:15)  T(F): 98.3 (30 Oct 2020 07:53), Max: 98.5 (29 Oct 2020 08:15)  HR: 84 (30 Oct 2020 07:53) (78 - 100)  BP: 146/95 (30 Oct 2020 07:53) (122/81 - 147/90)  BP(mean): --  RR: 18 (30 Oct 2020 07:53) (18 - 18)  SpO2: 97% (30 Oct 2020 07:53) (85% - 97%)        LABS:    10-30    146<H>  |  101  |  22  ----------------------------<  112<H>  3.4<L>   |  35<H>  |  1.51<H>    Ca    8.9      30 Oct 2020 06:32  Phos  3.1     10-30  Mg     1.8     10-30                 PROGRESS NOTE:   Authored by Nani Dinero MD  Pager [phone] [facility]     Patient is a 80y old  Male who presents with a chief complaint of Hypoxic Respiratory Failure (29 Oct 2020 14:18)      SUBJECTIVE / OVERNIGHT EVENTS:  Pt doing well, no complaints, resting comfortably in bed. No over night events.     MEDICATIONS  (STANDING):  apixaban 5 milliGRAM(s) Oral every 12 hours  aspirin enteric coated 81 milliGRAM(s) Oral daily  buMETAnide 2 milliGRAM(s) Oral daily  losartan 50 milliGRAM(s) Oral daily  metoprolol tartrate 50 milliGRAM(s) Oral two times a day  simvastatin 40 milliGRAM(s) Oral at bedtime    MEDICATIONS  (PRN):      I&O's Summary    29 Oct 2020 07:01  -  30 Oct 2020 07:00  --------------------------------------------------------  IN: 0 mL / OUT: 1400 mL / NET: -1400 mL        PHYSICAL EXAM:  Vital Signs Last 24 Hrs  T(C): 36.8 (30 Oct 2020 07:53), Max: 36.9 (29 Oct 2020 08:15)  T(F): 98.3 (30 Oct 2020 07:53), Max: 98.5 (29 Oct 2020 08:15)  HR: 84 (30 Oct 2020 07:53) (78 - 100)  BP: 146/95 (30 Oct 2020 07:53) (122/81 - 147/90)  BP(mean): --  RR: 18 (30 Oct 2020 07:53) (18 - 18)  SpO2: 97% (30 Oct 2020 07:53) (85% - 97%)    GENERAL: NAD, well-groomed, well-developed  HEAD:  Atraumatic, Normocephalic  EYES: EOMI, conjunctiva and sclera clear  ENMT: Moist mucous membranes  NECK: Supple  CHEST/LUNG: Crackles at bilateral bases, no extra work of breathing  HEART: Irregularly irregular; No murmurs, rubs, or gallops  ABDOMEN: Soft, Nontender, Nondistended  NEURO: Alert & Oriented X3  EXTREMITIES: B/l pitting LE edema, wounds dressed this AM/no soak through, no calf tenderness      LABS:    10-30    146<H>  |  101  |  22  ----------------------------<  112<H>  3.4<L>   |  35<H>  |  1.51<H>    Ca    8.9      30 Oct 2020 06:32  Phos  3.1     10-30  Mg     1.8     10-30

## 2020-10-31 LAB
ANION GAP SERPL CALC-SCNC: 11 MMOL/L — SIGNIFICANT CHANGE UP (ref 5–17)
BUN SERPL-MCNC: 26 MG/DL — HIGH (ref 7–23)
CALCIUM SERPL-MCNC: 8.9 MG/DL — SIGNIFICANT CHANGE UP (ref 8.4–10.5)
CHLORIDE SERPL-SCNC: 100 MMOL/L — SIGNIFICANT CHANGE UP (ref 96–108)
CO2 SERPL-SCNC: 36 MMOL/L — HIGH (ref 22–31)
CREAT SERPL-MCNC: 1.5 MG/DL — HIGH (ref 0.5–1.3)
GLUCOSE SERPL-MCNC: 104 MG/DL — HIGH (ref 70–99)
HCT VFR BLD CALC: 40.6 % — SIGNIFICANT CHANGE UP (ref 39–50)
HGB BLD-MCNC: 11.6 G/DL — LOW (ref 13–17)
MAGNESIUM SERPL-MCNC: 2 MG/DL — SIGNIFICANT CHANGE UP (ref 1.6–2.6)
MCHC RBC-ENTMCNC: 24.7 PG — LOW (ref 27–34)
MCHC RBC-ENTMCNC: 28.6 GM/DL — LOW (ref 32–36)
MCV RBC AUTO: 86.4 FL — SIGNIFICANT CHANGE UP (ref 80–100)
NRBC # BLD: 0 /100 WBCS — SIGNIFICANT CHANGE UP (ref 0–0)
PHOSPHATE SERPL-MCNC: 3.3 MG/DL — SIGNIFICANT CHANGE UP (ref 2.5–4.5)
PLATELET # BLD AUTO: 172 K/UL — SIGNIFICANT CHANGE UP (ref 150–400)
POTASSIUM SERPL-MCNC: 3.8 MMOL/L — SIGNIFICANT CHANGE UP (ref 3.5–5.3)
POTASSIUM SERPL-SCNC: 3.8 MMOL/L — SIGNIFICANT CHANGE UP (ref 3.5–5.3)
RBC # BLD: 4.7 M/UL — SIGNIFICANT CHANGE UP (ref 4.2–5.8)
RBC # FLD: 15.9 % — HIGH (ref 10.3–14.5)
SARS-COV-2 RNA SPEC QL NAA+PROBE: SIGNIFICANT CHANGE UP
SODIUM SERPL-SCNC: 147 MMOL/L — HIGH (ref 135–145)
WBC # BLD: 7.36 K/UL — SIGNIFICANT CHANGE UP (ref 3.8–10.5)
WBC # FLD AUTO: 7.36 K/UL — SIGNIFICANT CHANGE UP (ref 3.8–10.5)

## 2020-10-31 PROCEDURE — 99233 SBSQ HOSP IP/OBS HIGH 50: CPT | Mod: GC

## 2020-10-31 RX ORDER — BUMETANIDE 0.25 MG/ML
1 INJECTION INTRAMUSCULAR; INTRAVENOUS
Refills: 0 | Status: DISCONTINUED | OUTPATIENT
Start: 2020-10-31 | End: 2020-11-01

## 2020-10-31 RX ORDER — BUMETANIDE 0.25 MG/ML
2 INJECTION INTRAMUSCULAR; INTRAVENOUS DAILY
Refills: 0 | Status: DISCONTINUED | OUTPATIENT
Start: 2020-11-01 | End: 2020-11-02

## 2020-10-31 RX ADMIN — BUMETANIDE 1 MILLIGRAM(S): 0.25 INJECTION INTRAMUSCULAR; INTRAVENOUS at 17:04

## 2020-10-31 RX ADMIN — Medication 81 MILLIGRAM(S): at 12:07

## 2020-10-31 RX ADMIN — CARVEDILOL PHOSPHATE 12.5 MILLIGRAM(S): 80 CAPSULE, EXTENDED RELEASE ORAL at 17:04

## 2020-10-31 RX ADMIN — CARVEDILOL PHOSPHATE 12.5 MILLIGRAM(S): 80 CAPSULE, EXTENDED RELEASE ORAL at 06:13

## 2020-10-31 RX ADMIN — LOSARTAN POTASSIUM 50 MILLIGRAM(S): 100 TABLET, FILM COATED ORAL at 06:13

## 2020-10-31 RX ADMIN — APIXABAN 5 MILLIGRAM(S): 2.5 TABLET, FILM COATED ORAL at 17:04

## 2020-10-31 RX ADMIN — SIMVASTATIN 40 MILLIGRAM(S): 20 TABLET, FILM COATED ORAL at 22:13

## 2020-10-31 RX ADMIN — APIXABAN 5 MILLIGRAM(S): 2.5 TABLET, FILM COATED ORAL at 06:13

## 2020-10-31 NOTE — PROGRESS NOTE ADULT - ATTENDING COMMENTS
80 year old male with PMH HTN, atrial fibrillation on eliquis, PE, HLD, CKD jacky 2, CVA, bladder CA, PVD who presenting with toxic metabolic encephalopathy due to hypoxic/hypercapnic respiratory failure due to an acute exacerbation of his systolic heart failure. The patient is currently being diuresed with bumex per cardiology recommendations, will continue to follow up further recommendations. Will need ANDREA upon discharge. Rest of plan as above.

## 2020-10-31 NOTE — PROGRESS NOTE ADULT - PROBLEM SELECTOR PLAN 1
- Continue diuresis  - Plan for wean to room air  - Strict I&Os  - Daily weight  - Aim for net 1-2 L negative  - Increase PO Lopressor to 50 mg BID (10/29)  - Follow up with outpatient cardiologist regarding prescribed medications. Appears patient not taking as prescribed.  - switch to Losartan (from entresto due to no financial coverage)  - TTE on Oct 25 with EF of 35-40% (compared to 65% in February)  - Cards following, appreciate recs - Continue diuresis. IV Bumex 1 mg BID today. Plan to switch to PO tomorrow (11/1).  - Attempt wean to room air  - Strict I&Os  - Daily weight  - Aim for net 1-2 L negative  - Increase PO Lopressor to 50 mg BID (10/29)  - Continue Losartan, Coreg  - TTE on Oct 25 with EF of 35-40% (compared to 65% in February)  - Cards following, appreciate recs

## 2020-10-31 NOTE — PROGRESS NOTE ADULT - PROBLEM SELECTOR PLAN 6
Dispo - pending diuresis and medical management. Anticipate rehab    PCP - Catalino Lara  Cardiology - Dr. Rodri Julian ([facility])  Vascular - Dr. Birmingham

## 2020-10-31 NOTE — PROGRESS NOTE ADULT - PROBLEM SELECTOR PLAN 2
- Eliquis 5 mg BID  - Continue with metoprolol as above - Eliquis 5 mg BID  - Continue with Coreg as above

## 2020-10-31 NOTE — PROGRESS NOTE ADULT - PROBLEM SELECTOR PLAN 3
PO Lopressor 50 mg  - switch to Losartan (from entresto due to no financial coverage) PO Coreg 12.5 mg  - switch to Losartan (from entresto due to no financial coverage)

## 2020-10-31 NOTE — PROGRESS NOTE ADULT - SUBJECTIVE AND OBJECTIVE BOX
PROGRESS NOTE:   Authored by Nani Dinero MD  Pager [phone] [facility]     Patient is a 80y old  Male who presents with a chief complaint of Hypoxic Respiratory Failure (30 Oct 2020 13:28)      SUBJECTIVE / OVERNIGHT EVENTS:    MEDICATIONS  (STANDING):  apixaban 5 milliGRAM(s) Oral every 12 hours  aspirin enteric coated 81 milliGRAM(s) Oral daily  carvedilol 12.5 milliGRAM(s) Oral every 12 hours  losartan 50 milliGRAM(s) Oral daily  simvastatin 40 milliGRAM(s) Oral at bedtime    MEDICATIONS  (PRN):      I&O's Summary    30 Oct 2020 07:01  -  31 Oct 2020 07:00  --------------------------------------------------------  IN: 0 mL / OUT: 1400 mL / NET: -1400 mL        PHYSICAL EXAM:  Vital Signs Last 24 Hrs  T(C): 36.8 (31 Oct 2020 00:01), Max: 37 (30 Oct 2020 16:25)  T(F): 98.2 (31 Oct 2020 00:01), Max: 98.6 (30 Oct 2020 16:25)  HR: 91 (31 Oct 2020 06:08) (84 - 101)  BP: 130/89 (31 Oct 2020 06:08) (128/77 - 149/91)  BP(mean): --  RR: 18 (31 Oct 2020 00:01) (18 - 18)  SpO2: 98% (31 Oct 2020 00:01) (97% - 99%)        LABS:    10-30    146<H>  |  101  |  22  ----------------------------<  112<H>  3.4<L>   |  35<H>  |  1.51<H>    Ca    8.9      30 Oct 2020 06:32  Phos  3.1     10-30  Mg     1.8     10-30                 PROGRESS NOTE:   Authored by Nani Dinero MD  Pager [phone] [facility]     Patient is a 80y old  Male who presents with a chief complaint of Hypoxic Respiratory Failure (30 Oct 2020 13:28)      SUBJECTIVE / OVERNIGHT EVENTS:  Afebrile. VSS. Still requiring supplemental O2. Denies chest pain, denies SOB. States can do without NC but per nurse was requesting for comfort and did have desaturation on room air while ambulating with PT two days ago. Denies cough. Legs wrapped, patient removed outer wrap of right leg due to discomfort.    MEDICATIONS  (STANDING):  apixaban 5 milliGRAM(s) Oral every 12 hours  aspirin enteric coated 81 milliGRAM(s) Oral daily  carvedilol 12.5 milliGRAM(s) Oral every 12 hours  losartan 50 milliGRAM(s) Oral daily  simvastatin 40 milliGRAM(s) Oral at bedtime    MEDICATIONS  (PRN):      I&O's Summary    30 Oct 2020 07:01  -  31 Oct 2020 07:00  --------------------------------------------------------  IN: 0 mL / OUT: 1400 mL / NET: -1400 mL        PHYSICAL EXAM:  Vital Signs Last 24 Hrs  T(C): 36.8 (31 Oct 2020 00:01), Max: 37 (30 Oct 2020 16:25)  T(F): 98.2 (31 Oct 2020 00:01), Max: 98.6 (30 Oct 2020 16:25)  HR: 91 (31 Oct 2020 06:08) (84 - 101)  BP: 130/89 (31 Oct 2020 06:08) (128/77 - 149/91)  BP(mean): --  RR: 18 (31 Oct 2020 00:01) (18 - 18)  SpO2: 98% (31 Oct 2020 00:01) (97% - 99%)    GENERAL: NAD, well-groomed, well-developed  HEAD:  Atraumatic, Normocephalic  EYES: EOMI, conjunctiva and sclera clear  ENMT: Moist mucous membranes  NECK: Supple  CHEST/LUNG: Crackles at bilateral bases, no extra work of breathing  HEART: Irregularly irregular; No murmurs, rubs, or gallops  ABDOMEN: Soft, Nontender, Nondistended  NEURO: Alert & Oriented X3  EXTREMITIES: B/l pitting LE edema, wounds dressed this AM/no soak through, no calf tenderness    LABS:    10-30    146<H>  |  101  |  22  ----------------------------<  112<H>  3.4<L>   |  35<H>  |  1.51<H>    Ca    8.9      30 Oct 2020 06:32  Phos  3.1     10-30  Mg     1.8     10-30                 PROGRESS NOTE:   Authored by Nani Dinero MD  Pager [phone] [facility]     Patient is a 80y old  Male who presents with a chief complaint of Hypoxic Respiratory Failure (30 Oct 2020 13:28)      SUBJECTIVE / OVERNIGHT EVENTS:  Afebrile. VSS. Still requiring supplemental O2. Denies chest pain, denies SOB. States can do without NC but per nurse was requesting for comfort and did have desaturation on room air while ambulating with PT two days ago. Denies cough. Legs wrapped, patient removed outer wrap of right leg due to discomfort.    REVIEW OF SYSTEMS:    CONSTITUTIONAL: No weakness, fevers or chills  EYES/ENT: No visual changes;  No vertigo or throat pain   NECK: No pain or stiffness  RESPIRATORY: No cough, wheezing, hemoptysis; No shortness of breath  CARDIOVASCULAR: No chest pain or palpitations  GASTROINTESTINAL: No abdominal or epigastric pain. No nausea, vomiting, or hematemesis; No diarrhea or constipation. No melena or hematochezia.  GENITOURINARY: No dysuria, frequency or hematuria  NEUROLOGICAL: No numbness or weakness  SKIN: No itching, burning, rashes, or lesions   All other review of systems is negative unless indicated above.    MEDICATIONS  (STANDING):  apixaban 5 milliGRAM(s) Oral every 12 hours  aspirin enteric coated 81 milliGRAM(s) Oral daily  carvedilol 12.5 milliGRAM(s) Oral every 12 hours  losartan 50 milliGRAM(s) Oral daily  simvastatin 40 milliGRAM(s) Oral at bedtime    MEDICATIONS  (PRN):      I&O's Summary    30 Oct 2020 07:01  -  31 Oct 2020 07:00  --------------------------------------------------------  IN: 0 mL / OUT: 1400 mL / NET: -1400 mL        PHYSICAL EXAM:  Vital Signs Last 24 Hrs  T(C): 36.8 (31 Oct 2020 00:01), Max: 37 (30 Oct 2020 16:25)  T(F): 98.2 (31 Oct 2020 00:01), Max: 98.6 (30 Oct 2020 16:25)  HR: 91 (31 Oct 2020 06:08) (84 - 101)  BP: 130/89 (31 Oct 2020 06:08) (128/77 - 149/91)  BP(mean): --  RR: 18 (31 Oct 2020 00:01) (18 - 18)  SpO2: 98% (31 Oct 2020 00:01) (97% - 99%)    GENERAL: NAD, well-groomed, well-developed  HEAD:  Atraumatic, Normocephalic  EYES: EOMI, conjunctiva and sclera clear  ENMT: Moist mucous membranes  NECK: Supple  CHEST/LUNG: Crackles at bilateral bases, no extra work of breathing  HEART: Irregularly irregular; No murmurs, rubs, or gallops  ABDOMEN: Soft, Nontender, Nondistended  NEURO: Alert & Oriented X3  EXTREMITIES: B/l pitting LE edema, wounds dressed this AM/no soak through, no calf tenderness    LABS:    10-30    146<H>  |  101  |  22  ----------------------------<  112<H>  3.4<L>   |  35<H>  |  1.51<H>    Ca    8.9      30 Oct 2020 06:32  Phos  3.1     10-30  Mg     1.8     10-30

## 2020-11-01 LAB
ANION GAP SERPL CALC-SCNC: 7 MMOL/L — SIGNIFICANT CHANGE UP (ref 5–17)
BUN SERPL-MCNC: 31 MG/DL — HIGH (ref 7–23)
CALCIUM SERPL-MCNC: 8.7 MG/DL — SIGNIFICANT CHANGE UP (ref 8.4–10.5)
CHLORIDE SERPL-SCNC: 101 MMOL/L — SIGNIFICANT CHANGE UP (ref 96–108)
CO2 SERPL-SCNC: 38 MMOL/L — HIGH (ref 22–31)
CREAT SERPL-MCNC: 1.55 MG/DL — HIGH (ref 0.5–1.3)
GLUCOSE SERPL-MCNC: 110 MG/DL — HIGH (ref 70–99)
HCT VFR BLD CALC: 37.3 % — LOW (ref 39–50)
HGB BLD-MCNC: 11 G/DL — LOW (ref 13–17)
MAGNESIUM SERPL-MCNC: 1.9 MG/DL — SIGNIFICANT CHANGE UP (ref 1.6–2.6)
MCHC RBC-ENTMCNC: 25.1 PG — LOW (ref 27–34)
MCHC RBC-ENTMCNC: 29.5 GM/DL — LOW (ref 32–36)
MCV RBC AUTO: 85.2 FL — SIGNIFICANT CHANGE UP (ref 80–100)
NRBC # BLD: 0 /100 WBCS — SIGNIFICANT CHANGE UP (ref 0–0)
PHOSPHATE SERPL-MCNC: 2.8 MG/DL — SIGNIFICANT CHANGE UP (ref 2.5–4.5)
PLATELET # BLD AUTO: 173 K/UL — SIGNIFICANT CHANGE UP (ref 150–400)
POTASSIUM SERPL-MCNC: 3.6 MMOL/L — SIGNIFICANT CHANGE UP (ref 3.5–5.3)
POTASSIUM SERPL-SCNC: 3.6 MMOL/L — SIGNIFICANT CHANGE UP (ref 3.5–5.3)
RBC # BLD: 4.38 M/UL — SIGNIFICANT CHANGE UP (ref 4.2–5.8)
RBC # FLD: 15.8 % — HIGH (ref 10.3–14.5)
SODIUM SERPL-SCNC: 146 MMOL/L — HIGH (ref 135–145)
WBC # BLD: 7.08 K/UL — SIGNIFICANT CHANGE UP (ref 3.8–10.5)
WBC # FLD AUTO: 7.08 K/UL — SIGNIFICANT CHANGE UP (ref 3.8–10.5)

## 2020-11-01 PROCEDURE — 99233 SBSQ HOSP IP/OBS HIGH 50: CPT | Mod: GC

## 2020-11-01 RX ADMIN — LOSARTAN POTASSIUM 50 MILLIGRAM(S): 100 TABLET, FILM COATED ORAL at 05:59

## 2020-11-01 RX ADMIN — BUMETANIDE 2 MILLIGRAM(S): 0.25 INJECTION INTRAMUSCULAR; INTRAVENOUS at 06:21

## 2020-11-01 RX ADMIN — CARVEDILOL PHOSPHATE 12.5 MILLIGRAM(S): 80 CAPSULE, EXTENDED RELEASE ORAL at 05:59

## 2020-11-01 RX ADMIN — CARVEDILOL PHOSPHATE 12.5 MILLIGRAM(S): 80 CAPSULE, EXTENDED RELEASE ORAL at 18:07

## 2020-11-01 RX ADMIN — APIXABAN 5 MILLIGRAM(S): 2.5 TABLET, FILM COATED ORAL at 05:59

## 2020-11-01 RX ADMIN — APIXABAN 5 MILLIGRAM(S): 2.5 TABLET, FILM COATED ORAL at 18:07

## 2020-11-01 RX ADMIN — Medication 81 MILLIGRAM(S): at 12:42

## 2020-11-01 RX ADMIN — SIMVASTATIN 40 MILLIGRAM(S): 20 TABLET, FILM COATED ORAL at 22:54

## 2020-11-01 NOTE — PROGRESS NOTE ADULT - SUBJECTIVE AND OBJECTIVE BOX
PROGRESS NOTE:     Patient is a 80y old  Male who presents with a chief complaint of Hypoxic Respiratory Failure (31 Oct 2020 07:02)      SUBJECTIVE / OVERNIGHT EVENTS: Patient seen and examined at bedside. Vital signs stable overnight. Patient denies headache, dizziness, chest/abd pain, shortness of breath. ROS negative unless otherwise stated.     ADDITIONAL REVIEW OF SYSTEMS:    MEDICATIONS  (STANDING):  apixaban 5 milliGRAM(s) Oral every 12 hours  aspirin enteric coated 81 milliGRAM(s) Oral daily  buMETAnide 2 milliGRAM(s) Oral daily  carvedilol 12.5 milliGRAM(s) Oral every 12 hours  losartan 50 milliGRAM(s) Oral daily  simvastatin 40 milliGRAM(s) Oral at bedtime    MEDICATIONS  (PRN):      CAPILLARY BLOOD GLUCOSE        I&O's Summary    31 Oct 2020 08:01  -  01 Nov 2020 07:00  --------------------------------------------------------  IN: 420 mL / OUT: 1150 mL / NET: -730 mL        PHYSICAL EXAM:  Vital Signs Last 24 Hrs  T(C): 36.6 (01 Nov 2020 07:22), Max: 36.9 (31 Oct 2020 16:49)  T(F): 97.8 (01 Nov 2020 07:22), Max: 98.5 (31 Oct 2020 16:49)  HR: 65 (01 Nov 2020 07:22) (65 - 88)  BP: 125/76 (01 Nov 2020 07:22) (125/76 - 139/83)  BP(mean): --  RR: 18 (01 Nov 2020 07:22) (17 - 18)  SpO2: 96% (01 Nov 2020 07:22) (92% - 97%)    CONSTITUTIONAL: NAD, well-developed  RESPIRATORY: Normal respiratory effort; lungs are clear to auscultation bilaterally  CARDIOVASCULAR: Regular rate and rhythm, normal S1 and S2, no murmur/rub/gallop; No lower extremity edema; Peripheral pulses are 2+ bilaterally  ABDOMEN: Nontender to palpation, normoactive bowel sounds, no rebound/guarding; No hepatosplenomegaly  MUSCLOSKELETAL: no clubbing or cyanosis of digits; no joint swelling or tenderness to palpation  PSYCH: A+O to person, place, and time; affect appropriate    LABS:                        11.0   7.08  )-----------( 173      ( 01 Nov 2020 06:13 )             37.3     11-01    146<H>  |  101  |  31<H>  ----------------------------<  110<H>  3.6   |  38<H>  |  1.55<H>    Ca    8.7      01 Nov 2020 06:13  Phos  2.8     11-01  Mg     1.9     11-01                  RADIOLOGY & ADDITIONAL TESTS:  Results Reviewed:   Imaging Personally Reviewed:  Electrocardiogram Personally Reviewed:    COORDINATION OF CARE:  Care Discussed with Consultants/Other Providers [Y/N]:  Prior or Outpatient Records Reviewed [Y/N]:   PROGRESS NOTE:     Patient is a 80y old  Male who presents with a chief complaint of Hypoxic Respiratory Failure (31 Oct 2020 07:02)      SUBJECTIVE / OVERNIGHT EVENTS: Patient seen and examined at bedside. Vital signs stable overnight. Patient denies headache, dizziness, chest/abd pain, shortness of breath. ROS negative unless otherwise stated.     REVIEW OF SYSTEMS:    CONSTITUTIONAL: No weakness, fevers or chills  EYES/ENT: No visual changes;  No vertigo or throat pain   NECK: No pain or stiffness  RESPIRATORY: No cough, wheezing, hemoptysis; No shortness of breath  CARDIOVASCULAR: No chest pain or palpitations  GASTROINTESTINAL: No abdominal or epigastric pain. No nausea, vomiting, or hematemesis; No diarrhea or constipation. No melena or hematochezia.  GENITOURINARY: No dysuria, frequency or hematuria  NEUROLOGICAL: No numbness or weakness  SKIN: No itching, burning, rashes, or lesions   All other review of systems is negative unless indicated above.    MEDICATIONS  (STANDING):  apixaban 5 milliGRAM(s) Oral every 12 hours  aspirin enteric coated 81 milliGRAM(s) Oral daily  buMETAnide 2 milliGRAM(s) Oral daily  carvedilol 12.5 milliGRAM(s) Oral every 12 hours  losartan 50 milliGRAM(s) Oral daily  simvastatin 40 milliGRAM(s) Oral at bedtime    MEDICATIONS  (PRN):      CAPILLARY BLOOD GLUCOSE        I&O's Summary    31 Oct 2020 08:01  -  01 Nov 2020 07:00  --------------------------------------------------------  IN: 420 mL / OUT: 1150 mL / NET: -730 mL        PHYSICAL EXAM:  Vital Signs Last 24 Hrs  T(C): 36.6 (01 Nov 2020 07:22), Max: 36.9 (31 Oct 2020 16:49)  T(F): 97.8 (01 Nov 2020 07:22), Max: 98.5 (31 Oct 2020 16:49)  HR: 65 (01 Nov 2020 07:22) (65 - 88)  BP: 125/76 (01 Nov 2020 07:22) (125/76 - 139/83)  BP(mean): --  RR: 18 (01 Nov 2020 07:22) (17 - 18)  SpO2: 96% (01 Nov 2020 07:22) (92% - 97%)    CONSTITUTIONAL: NAD, well-developed  RESPIRATORY: Normal respiratory effort; lungs are clear to auscultation bilaterally  CARDIOVASCULAR: Regular rate and rhythm, normal S1 and S2, no murmur/rub/gallop; No lower extremity edema; Peripheral pulses are 2+ bilaterally  ABDOMEN: Nontender to palpation, normoactive bowel sounds, no rebound/guarding; No hepatosplenomegaly  MUSCLOSKELETAL: no clubbing or cyanosis of digits; no joint swelling or tenderness to palpation  PSYCH: A+O to person, place, and time; affect appropriate    LABS:                        11.0   7.08  )-----------( 173      ( 01 Nov 2020 06:13 )             37.3     11-01    146<H>  |  101  |  31<H>  ----------------------------<  110<H>  3.6   |  38<H>  |  1.55<H>    Ca    8.7      01 Nov 2020 06:13  Phos  2.8     11-01  Mg     1.9     11-01                  RADIOLOGY & ADDITIONAL TESTS:  Results Reviewed:   Imaging Personally Reviewed:  Electrocardiogram Personally Reviewed:    COORDINATION OF CARE:  Care Discussed with Consultants/Other Providers [Y/N]:  Prior or Outpatient Records Reviewed [Y/N]:

## 2020-11-01 NOTE — PROGRESS NOTE ADULT - PROBLEM SELECTOR PLAN 1
- Continue diuresis. Switch to PO 11/1  - Attempt wean to room air  - Strict I&Os  - Daily weight  - Aim for net 1-2 L negative  - Increase PO Lopressor to 50 mg BID (10/29)  - Continue Losartan, Coreg  - TTE on Oct 25 with EF of 35-40% (compared to 65% in February)  - Cards following, appreciate recs

## 2020-11-01 NOTE — PROGRESS NOTE ADULT - ATTENDING COMMENTS
80 year old male with PMH HTN, atrial fibrillation on eliquis, PE, HLD, CKD jacky 2, CVA, bladder CA, PVD who presenting with toxic metabolic encephalopathy due to hypoxic/hypercapnic respiratory failure due to an acute exacerbation of his systolic heart failure. The patient is currently being diuresed with bumex per cardiology recommendations, will continue to follow up further recommendations. Will need ANDREA upon discharge. Rest of plan as above.    Terry Convissar, DO  Pager [phone]  If no answer [phone]

## 2020-11-01 NOTE — PROGRESS NOTE ADULT - PROBLEM SELECTOR PLAN 6
Dispo - pending diuresis and medical management. Anticipate rehab    PCP - Catalino Lara  Cardiology - Dr. Rodri Julian ([facility])  Vascular - Dr. Birmingham

## 2020-11-02 ENCOUNTER — TRANSCRIPTION ENCOUNTER (OUTPATIENT)
Age: 80
End: 2020-11-02

## 2020-11-02 VITALS
HEART RATE: 84 BPM | OXYGEN SATURATION: 96 % | RESPIRATION RATE: 18 BRPM | TEMPERATURE: 98 F | SYSTOLIC BLOOD PRESSURE: 107 MMHG | DIASTOLIC BLOOD PRESSURE: 67 MMHG

## 2020-11-02 LAB
ANION GAP SERPL CALC-SCNC: 10 MMOL/L — SIGNIFICANT CHANGE UP (ref 5–17)
BUN SERPL-MCNC: 32 MG/DL — HIGH (ref 7–23)
CALCIUM SERPL-MCNC: 9 MG/DL — SIGNIFICANT CHANGE UP (ref 8.4–10.5)
CHLORIDE SERPL-SCNC: 99 MMOL/L — SIGNIFICANT CHANGE UP (ref 96–108)
CO2 SERPL-SCNC: 36 MMOL/L — HIGH (ref 22–31)
CREAT SERPL-MCNC: 1.55 MG/DL — HIGH (ref 0.5–1.3)
GLUCOSE SERPL-MCNC: 97 MG/DL — SIGNIFICANT CHANGE UP (ref 70–99)
HCT VFR BLD CALC: 36.9 % — LOW (ref 39–50)
HGB BLD-MCNC: 10.7 G/DL — LOW (ref 13–17)
MAGNESIUM SERPL-MCNC: 2 MG/DL — SIGNIFICANT CHANGE UP (ref 1.6–2.6)
MCHC RBC-ENTMCNC: 24.7 PG — LOW (ref 27–34)
MCHC RBC-ENTMCNC: 29 GM/DL — LOW (ref 32–36)
MCV RBC AUTO: 85 FL — SIGNIFICANT CHANGE UP (ref 80–100)
NRBC # BLD: 0 /100 WBCS — SIGNIFICANT CHANGE UP (ref 0–0)
PHOSPHATE SERPL-MCNC: 3.1 MG/DL — SIGNIFICANT CHANGE UP (ref 2.5–4.5)
PLATELET # BLD AUTO: 180 K/UL — SIGNIFICANT CHANGE UP (ref 150–400)
POTASSIUM SERPL-MCNC: 3.3 MMOL/L — LOW (ref 3.5–5.3)
POTASSIUM SERPL-SCNC: 3.3 MMOL/L — LOW (ref 3.5–5.3)
RBC # BLD: 4.34 M/UL — SIGNIFICANT CHANGE UP (ref 4.2–5.8)
RBC # FLD: 15.7 % — HIGH (ref 10.3–14.5)
SODIUM SERPL-SCNC: 145 MMOL/L — SIGNIFICANT CHANGE UP (ref 135–145)
WBC # BLD: 6.83 K/UL — SIGNIFICANT CHANGE UP (ref 3.8–10.5)
WBC # FLD AUTO: 6.83 K/UL — SIGNIFICANT CHANGE UP (ref 3.8–10.5)

## 2020-11-02 PROCEDURE — 83735 ASSAY OF MAGNESIUM: CPT

## 2020-11-02 PROCEDURE — 84295 ASSAY OF SERUM SODIUM: CPT

## 2020-11-02 PROCEDURE — 85014 HEMATOCRIT: CPT

## 2020-11-02 PROCEDURE — 51702 INSERT TEMP BLADDER CATH: CPT | Mod: XU

## 2020-11-02 PROCEDURE — 82435 ASSAY OF BLOOD CHLORIDE: CPT

## 2020-11-02 PROCEDURE — 97530 THERAPEUTIC ACTIVITIES: CPT

## 2020-11-02 PROCEDURE — 82728 ASSAY OF FERRITIN: CPT

## 2020-11-02 PROCEDURE — 87641 MR-STAPH DNA AMP PROBE: CPT

## 2020-11-02 PROCEDURE — 83036 HEMOGLOBIN GLYCOSYLATED A1C: CPT

## 2020-11-02 PROCEDURE — 99232 SBSQ HOSP IP/OBS MODERATE 35: CPT | Mod: GC

## 2020-11-02 PROCEDURE — 83880 ASSAY OF NATRIURETIC PEPTIDE: CPT

## 2020-11-02 PROCEDURE — 97164 PT RE-EVAL EST PLAN CARE: CPT

## 2020-11-02 PROCEDURE — 87640 STAPH A DNA AMP PROBE: CPT

## 2020-11-02 PROCEDURE — 94002 VENT MGMT INPAT INIT DAY: CPT

## 2020-11-02 PROCEDURE — 83605 ASSAY OF LACTIC ACID: CPT

## 2020-11-02 PROCEDURE — 93306 TTE W/DOPPLER COMPLETE: CPT

## 2020-11-02 PROCEDURE — 84132 ASSAY OF SERUM POTASSIUM: CPT

## 2020-11-02 PROCEDURE — 85027 COMPLETE CBC AUTOMATED: CPT

## 2020-11-02 PROCEDURE — 87449 NOS EACH ORGANISM AG IA: CPT

## 2020-11-02 PROCEDURE — 85018 HEMOGLOBIN: CPT

## 2020-11-02 PROCEDURE — 84484 ASSAY OF TROPONIN QUANT: CPT

## 2020-11-02 PROCEDURE — 85610 PROTHROMBIN TIME: CPT

## 2020-11-02 PROCEDURE — 93970 EXTREMITY STUDY: CPT

## 2020-11-02 PROCEDURE — 99291 CRITICAL CARE FIRST HOUR: CPT | Mod: 25

## 2020-11-02 PROCEDURE — 76376 3D RENDER W/INTRP POSTPROCES: CPT

## 2020-11-02 PROCEDURE — 82553 CREATINE MB FRACTION: CPT

## 2020-11-02 PROCEDURE — 74176 CT ABD & PELVIS W/O CONTRAST: CPT

## 2020-11-02 PROCEDURE — 97116 GAIT TRAINING THERAPY: CPT

## 2020-11-02 PROCEDURE — 94660 CPAP INITIATION&MGMT: CPT

## 2020-11-02 PROCEDURE — 86140 C-REACTIVE PROTEIN: CPT

## 2020-11-02 PROCEDURE — 81001 URINALYSIS AUTO W/SCOPE: CPT

## 2020-11-02 PROCEDURE — 82330 ASSAY OF CALCIUM: CPT

## 2020-11-02 PROCEDURE — 94003 VENT MGMT INPAT SUBQ DAY: CPT

## 2020-11-02 PROCEDURE — 70450 CT HEAD/BRAIN W/O DYE: CPT

## 2020-11-02 PROCEDURE — 82962 GLUCOSE BLOOD TEST: CPT

## 2020-11-02 PROCEDURE — 82947 ASSAY GLUCOSE BLOOD QUANT: CPT

## 2020-11-02 PROCEDURE — 85379 FIBRIN DEGRADATION QUANT: CPT

## 2020-11-02 PROCEDURE — 97110 THERAPEUTIC EXERCISES: CPT

## 2020-11-02 PROCEDURE — 0225U NFCT DS DNA&RNA 21 SARSCOV2: CPT

## 2020-11-02 PROCEDURE — 74230 X-RAY XM SWLNG FUNCJ C+: CPT

## 2020-11-02 PROCEDURE — 71250 CT THORAX DX C-: CPT

## 2020-11-02 PROCEDURE — 87086 URINE CULTURE/COLONY COUNT: CPT

## 2020-11-02 PROCEDURE — 72125 CT NECK SPINE W/O DYE: CPT

## 2020-11-02 PROCEDURE — 96374 THER/PROPH/DIAG INJ IV PUSH: CPT | Mod: XU

## 2020-11-02 PROCEDURE — 31500 INSERT EMERGENCY AIRWAY: CPT

## 2020-11-02 PROCEDURE — 85025 COMPLETE CBC W/AUTO DIFF WBC: CPT

## 2020-11-02 PROCEDURE — 92610 EVALUATE SWALLOWING FUNCTION: CPT

## 2020-11-02 PROCEDURE — 96375 TX/PRO/DX INJ NEW DRUG ADDON: CPT | Mod: XU

## 2020-11-02 PROCEDURE — 80053 COMPREHEN METABOLIC PANEL: CPT

## 2020-11-02 PROCEDURE — 82550 ASSAY OF CK (CPK): CPT

## 2020-11-02 PROCEDURE — 82803 BLOOD GASES ANY COMBINATION: CPT

## 2020-11-02 PROCEDURE — 84100 ASSAY OF PHOSPHORUS: CPT

## 2020-11-02 PROCEDURE — 84443 ASSAY THYROID STIM HORMONE: CPT

## 2020-11-02 PROCEDURE — 85730 THROMBOPLASTIN TIME PARTIAL: CPT

## 2020-11-02 PROCEDURE — 93005 ELECTROCARDIOGRAM TRACING: CPT | Mod: XU

## 2020-11-02 PROCEDURE — 84145 PROCALCITONIN (PCT): CPT

## 2020-11-02 PROCEDURE — 92611 MOTION FLUOROSCOPY/SWALLOW: CPT

## 2020-11-02 PROCEDURE — U0003: CPT

## 2020-11-02 PROCEDURE — 87040 BLOOD CULTURE FOR BACTERIA: CPT

## 2020-11-02 PROCEDURE — 99239 HOSP IP/OBS DSCHRG MGMT >30: CPT

## 2020-11-02 PROCEDURE — 80048 BASIC METABOLIC PNL TOTAL CA: CPT

## 2020-11-02 PROCEDURE — 86769 SARS-COV-2 COVID-19 ANTIBODY: CPT

## 2020-11-02 PROCEDURE — 71045 X-RAY EXAM CHEST 1 VIEW: CPT

## 2020-11-02 PROCEDURE — 97602 WOUND(S) CARE NON-SELECTIVE: CPT

## 2020-11-02 PROCEDURE — 82533 TOTAL CORTISOL: CPT

## 2020-11-02 PROCEDURE — 97162 PT EVAL MOD COMPLEX 30 MIN: CPT

## 2020-11-02 RX ORDER — TRIAMTERENE/HYDROCHLOROTHIAZID 75 MG-50MG
1 TABLET ORAL
Qty: 0 | Refills: 0 | DISCHARGE

## 2020-11-02 RX ORDER — APIXABAN 2.5 MG/1
1 TABLET, FILM COATED ORAL
Qty: 0 | Refills: 0 | DISCHARGE

## 2020-11-02 RX ORDER — BUMETANIDE 0.25 MG/ML
1 INJECTION INTRAMUSCULAR; INTRAVENOUS
Qty: 30 | Refills: 0
Start: 2020-11-02 | End: 2020-12-01

## 2020-11-02 RX ORDER — FEBUXOSTAT 40 MG/1
1 TABLET ORAL
Qty: 0 | Refills: 0 | DISCHARGE

## 2020-11-02 RX ORDER — TERAZOSIN HYDROCHLORIDE 10 MG/1
1 CAPSULE ORAL
Qty: 0 | Refills: 0 | DISCHARGE

## 2020-11-02 RX ORDER — APIXABAN 2.5 MG/1
1 TABLET, FILM COATED ORAL
Qty: 60 | Refills: 0
Start: 2020-11-02 | End: 2020-12-01

## 2020-11-02 RX ORDER — CARVEDILOL PHOSPHATE 80 MG/1
1 CAPSULE, EXTENDED RELEASE ORAL
Qty: 60 | Refills: 0
Start: 2020-11-02 | End: 2020-12-01

## 2020-11-02 RX ORDER — ATORVASTATIN CALCIUM 80 MG/1
1 TABLET, FILM COATED ORAL
Qty: 30 | Refills: 0
Start: 2020-11-02 | End: 2020-12-01

## 2020-11-02 RX ORDER — POTASSIUM CHLORIDE 20 MEQ
40 PACKET (EA) ORAL EVERY 4 HOURS
Refills: 0 | Status: COMPLETED | OUTPATIENT
Start: 2020-11-02 | End: 2020-11-02

## 2020-11-02 RX ORDER — POTASSIUM CHLORIDE 20 MEQ
2 PACKET (EA) ORAL
Qty: 14 | Refills: 0
Start: 2020-11-02 | End: 2020-11-08

## 2020-11-02 RX ORDER — TAMSULOSIN HYDROCHLORIDE 0.4 MG/1
1 CAPSULE ORAL
Qty: 0 | Refills: 0 | DISCHARGE

## 2020-11-02 RX ORDER — SIMVASTATIN 20 MG/1
1 TABLET, FILM COATED ORAL
Qty: 0 | Refills: 0 | DISCHARGE

## 2020-11-02 RX ORDER — METOPROLOL TARTRATE 50 MG
1 TABLET ORAL
Qty: 0 | Refills: 0 | DISCHARGE

## 2020-11-02 RX ADMIN — BUMETANIDE 2 MILLIGRAM(S): 0.25 INJECTION INTRAMUSCULAR; INTRAVENOUS at 06:22

## 2020-11-02 RX ADMIN — LOSARTAN POTASSIUM 50 MILLIGRAM(S): 100 TABLET, FILM COATED ORAL at 06:22

## 2020-11-02 RX ADMIN — Medication 81 MILLIGRAM(S): at 13:03

## 2020-11-02 RX ADMIN — APIXABAN 5 MILLIGRAM(S): 2.5 TABLET, FILM COATED ORAL at 06:22

## 2020-11-02 RX ADMIN — CARVEDILOL PHOSPHATE 12.5 MILLIGRAM(S): 80 CAPSULE, EXTENDED RELEASE ORAL at 06:22

## 2020-11-02 RX ADMIN — Medication 40 MILLIEQUIVALENT(S): at 13:03

## 2020-11-02 RX ADMIN — Medication 40 MILLIEQUIVALENT(S): at 10:38

## 2020-11-02 NOTE — CHART NOTE - NSCHARTNOTEFT_GEN_A_CORE
RD CHF education chart note    Patient was visited by RD for CHF education. Heart failure education provided to the patient in detail. Discussed heart failure nutrition therapy, sodium and fluid intake, importance of diet adherence, daily weights monitoring with the patient. Reinforced importance of weight gain parameters and importance of contacting MD’s about weight changes. Provided handouts on heart failure nutrition therapy, reading heart healthy nutrition labels, heart healthy shopping tips and low sodium food list. Patient verbalized understanding demonstrated by teach back method. RD contact information left with patient for any future questioning.    RD remains available.   Ivelisse Hansen MS RD CDN [facility],  #[phone]

## 2020-11-02 NOTE — PROGRESS NOTE ADULT - PROBLEM SELECTOR PLAN 6
Dispo - pending diuresis and medical management. Anticipate rehab    PCP - Catalino Lara  Cardiology - Dr. Rodri Julian ([facility])  Vascular - Dr. Birmingham

## 2020-11-02 NOTE — PROGRESS NOTE ADULT - PROBLEM SELECTOR PLAN 1
- Continue diuresis. Switch to PO 11/1  - Attempt wean to room air  - Strict I&Os  - Daily weight  - Aim for net 1-2 L negative  - Increase PO Lopressor to 50 mg BID (10/29)  - Continue Losartan, Coreg  - TTE on Oct 25 with EF of 35-40% (compared to 65% in February)  - Cards following, appreciate recs - Continue diuresis. Switch to PO 11/1  - Attempt wean to room air  - Strict I&Os  - Daily weight  - Aim for net 1-2 L negative  - Increase PO Lopressor to 50 mg BID (10/29)  - Continue Losartan, Coreg  - TTE on Oct 25 with EF of 35-40% (compared to 65% in February)  - Cards following, appreciate recs  - D/c to rehab with follow up

## 2020-11-02 NOTE — PROGRESS NOTE ADULT - SUBJECTIVE AND OBJECTIVE BOX
Patient seen and examined at bedside.    Overnight Events: No acute events overnight, patient continues to improved with diuresis, now approaching euvolemia with improvement in symptoms.     Review Of Systems: No chest pain, shortness of breath, or palpitations            Current Meds:  apixaban 5 milliGRAM(s) Oral every 12 hours  aspirin enteric coated 81 milliGRAM(s) Oral daily  buMETAnide 2 milliGRAM(s) Oral daily  carvedilol 12.5 milliGRAM(s) Oral every 12 hours  losartan 50 milliGRAM(s) Oral daily  potassium chloride    Tablet ER 40 milliEquivalent(s) Oral every 4 hours  simvastatin 40 milliGRAM(s) Oral at bedtime      Vitals:  T(F): 98.1 (11-02), Max: 98.8 (11-01)  HR: 79 (11-02) (75 - 80)  BP: 119/80 (11-02) (115/77 - 131/77)  RR: 18 (11-02)  SpO2: 98% (11-02)  I&O's Summary    01 Nov 2020 07:01  -  02 Nov 2020 07:00  --------------------------------------------------------  IN: 0 mL / OUT: 750 mL / NET: -750 mL        Physical Exam:  Appearance: No acute distress; well appearing  Eyes: PERRL, EOMI, pink conjunctiva  HEENT: Normal oral mucosa  Cardiovascular: RRR, S1, S2, no murmurs, rubs, or gallops; 1+ edema; mild JVD  Respiratory: largely clear to auscultation bilaterally  Gastrointestinal: soft, non-tender, non-distended with normal bowel sounds  Musculoskeletal: No clubbing; no joint deformity   Neurologic: Non-focal  Lymphatic: No lymphadenopathy  Psychiatry: AAOx3, mood & affect appropriate  Skin: No rashes, ecchymoses, or cyanosis                          10.7   6.83  )-----------( 180      ( 02 Nov 2020 07:02 )             36.9     11-02    145  |  99  |  32<H>  ----------------------------<  97  3.3<L>   |  36<H>  |  1.55<H>    Ca    9.0      02 Nov 2020 07:01  Phos  3.1     11-02  Mg     2.0     11-02    80 year old man with history of chronic renal insufficiency, atrial fibrillation on apixaban, HF with preserved EF, prior CVA (cerebellar) and chronic venous insufficiency who presented with AMS/hypoxic resp failure, Cardiology consulted for newly reduced EF by echo (35-40%) on 10/25. Hs Smita negative x2. BNP on admission ~ 5700.     #Acute congestive HFrEF, likely secondary to hypertensive heart disease   - carvedilol 12.5 BID for better BP control, can increase to 25mg bid   - Volume status much improved, now w/ contraction alkylosis and slight Cr increase, decrease diuretic to bumex 1mg daily, replete K to 4.5  - daily weights, I/Os, bid lytes   - transition simvastatin to atorvastatin 40mg   - C/w Eliquis 5mg BID  - C/w losartan 50mg   - Strict I/Os and low salt diet, fluid restriction  - Electrolyte replacement as needed    Patient seen and examined at bedside.    Overnight Events: No acute events overnight, patient continues to improved with diuresis, now approaching euvolemia with improvement in symptoms.     Review Of Systems: No chest pain, shortness of breath, or palpitations            Current Meds:  apixaban 5 milliGRAM(s) Oral every 12 hours  aspirin enteric coated 81 milliGRAM(s) Oral daily  buMETAnide 2 milliGRAM(s) Oral daily  carvedilol 12.5 milliGRAM(s) Oral every 12 hours  losartan 50 milliGRAM(s) Oral daily  potassium chloride    Tablet ER 40 milliEquivalent(s) Oral every 4 hours  simvastatin 40 milliGRAM(s) Oral at bedtime    Vitals:  T(F): 98.1 (11-02), Max: 98.8 (11-01)  HR: 79 (11-02) (75 - 80)  BP: 119/80 (11-02) (115/77 - 131/77)  RR: 18 (11-02)  SpO2: 98% (11-02)    Physical Exam:  Appearance: No acute distress; well appearing  Eyes: PERRL, EOMI, pink conjunctiva  HEENT: Normal oral mucosa  Cardiovascular: RRR, S1, S2, no murmurs, rubs, or gallops; 1+ edema; mild JVD  Respiratory: largely clear to auscultation bilaterally  Gastrointestinal: soft, non-tender, non-distended with normal bowel sounds  Musculoskeletal: No clubbing; no joint deformity   Neurologic: Non-focal  Lymphatic: No lymphadenopathy  Psychiatry: AAOx3, mood & affect appropriate  Skin: No rashes, ecchymoses, or cyanosis    I&O's Summary    01 Nov 2020 07:01  -  02 Nov 2020 07:00  --------------------------------------------------------  IN: 0 mL / OUT: 750 mL / NET: -750 mL      LABS:                      10.7   6.83  )-----------( 180      ( 02 Nov 2020 07:02 )             36.9     11-02  145  |  99  |  32<H>  ----------------------------<  97  3.3<L>   |  36<H>  |  1.55<H>    Ca    9.0      02 Nov 2020 07:01  Phos  3.1     11-02  Mg     2.0     11-02

## 2020-11-02 NOTE — PROGRESS NOTE ADULT - SUBJECTIVE AND OBJECTIVE BOX
PROGRESS NOTE:   Authored by Aiden Watters, MS3    Patient is a 80y old  Male who presents with a chief complaint of Hypoxic Respiratory Failure (02 Nov 2020 09:46)      SUBJECTIVE / OVERNIGHT EVENTS:  Pt is resting well and comfortably in bed. pt continues to make urine and diurese. No events over night.     MEDICATIONS  (STANDING):  apixaban 5 milliGRAM(s) Oral every 12 hours  aspirin enteric coated 81 milliGRAM(s) Oral daily  buMETAnide 2 milliGRAM(s) Oral daily  carvedilol 12.5 milliGRAM(s) Oral every 12 hours  losartan 50 milliGRAM(s) Oral daily  simvastatin 40 milliGRAM(s) Oral at bedtime    MEDICATIONS  (PRN):      I&O's Summary    01 Nov 2020 07:01  -  02 Nov 2020 07:00  --------------------------------------------------------  IN: 0 mL / OUT: 750 mL / NET: -750 mL        PHYSICAL EXAM:  Vital Signs Last 24 Hrs  T(C): 36.4 (02 Nov 2020 10:04), Max: 37.1 (01 Nov 2020 16:18)  T(F): 97.5 (02 Nov 2020 10:04), Max: 98.8 (01 Nov 2020 16:18)  HR: 101 (02 Nov 2020 10:09) (75 - 101)  BP: 113/65 (02 Nov 2020 10:04) (113/65 - 131/77)  BP(mean): --  RR: 18 (02 Nov 2020 10:04) (18 - 18)  SpO2: 93% (02 Nov 2020 12:08) (90% - 98%)    Gen: Alert and oriented. resting comfortably  CV: Irregularly, irregular. S1, S2. No rubs, murmurs, or gallops.  Lungs: Normal respiratory effort. reduced bibasilar crackles.   Abd: nontender, nondistended. Active bowel sounds. No rebound/no guarding.  Extremities: 2+ pitting edema in thigh. ACE wrapped bilateral LE.     LABS:                        10.7   6.83  )-----------( 180      ( 02 Nov 2020 07:02 )             36.9     11-02    145  |  99  |  32<H>  ----------------------------<  97  3.3<L>   |  36<H>  |  1.55<H>    Ca    9.0      02 Nov 2020 07:01  Phos  3.1     11-02  Mg     2.0     11-02

## 2020-11-02 NOTE — PROGRESS NOTE ADULT - SUBJECTIVE AND OBJECTIVE BOX
Patient is a 80y old  Male who presents with a chief complaint of Hypoxic Respiratory Failure (01 Nov 2020 08:26)      SUBJECTIVE / OVERNIGHT EVENTS:          MEDICATIONS  (STANDING):  apixaban 5 milliGRAM(s) Oral every 12 hours  aspirin enteric coated 81 milliGRAM(s) Oral daily  buMETAnide 2 milliGRAM(s) Oral daily  carvedilol 12.5 milliGRAM(s) Oral every 12 hours  losartan 50 milliGRAM(s) Oral daily  simvastatin 40 milliGRAM(s) Oral at bedtime    MEDICATIONS  (PRN):      Vital Signs Last 24 Hrs  T(C): 36.7 (02 Nov 2020 00:05), Max: 37.1 (01 Nov 2020 16:18)  T(F): 98.1 (02 Nov 2020 00:05), Max: 98.8 (01 Nov 2020 16:18)  HR: 79 (02 Nov 2020 06:00) (75 - 80)  BP: 119/80 (02 Nov 2020 06:00) (115/77 - 131/77)  BP(mean): --  RR: 18 (02 Nov 2020 00:05) (18 - 18)  SpO2: 98% (02 Nov 2020 00:05) (98% - 98%)      PHYSICAL EXAM  GENERAL: NAD, well-developed  HEAD:  Atraumatic, Normocephalic  EYES: EOMI, PERRLA, conjunctiva and sclera clear  NECK: Supple, No JVD  CHEST/LUNG: Clear to auscultation bilaterally; No wheeze  HEART: Regular rate and rhythm; No murmurs, rubs, or gallops  ABDOMEN: Soft, Nontender, Nondistended; Bowel sounds present  EXTREMITIES:  2+ Peripheral Pulses, No clubbing, cyanosis, or edema  PSYCH: AAOx3  SKIN: No rashes or lesions    CAPILLARY BLOOD GLUCOSE        I&O's Summary    01 Nov 2020 07:01  -  02 Nov 2020 07:00  --------------------------------------------------------  IN: 0 mL / OUT: 750 mL / NET: -750 mL        LABS:                        10.7   6.83  )-----------( 180      ( 02 Nov 2020 07:02 )             36.9     11-02    145  |  99  |  32<H>  ----------------------------<  97  3.3<L>   |  36<H>  |  1.55<H>    Ca    9.0      02 Nov 2020 07:01  Phos  3.1     11-02  Mg     2.0     11-02                RADIOLOGY & ADDITIONAL TESTS:     MICROBIOLOGY:    ANTIMICROBIALS:    CONSULTS: Patient is a 80y old  Male who presents with a chief complaint of Hypoxic Respiratory Failure (01 Nov 2020 08:26)      SUBJECTIVE / OVERNIGHT EVENTS:    No acute events overnight. Patient feels better this morning. He denies any fever, chills, nausea, vomiting, diarrhea, chest pain, SOB or abdominal pain.       MEDICATIONS  (STANDING):  apixaban 5 milliGRAM(s) Oral every 12 hours  aspirin enteric coated 81 milliGRAM(s) Oral daily  buMETAnide 2 milliGRAM(s) Oral daily  carvedilol 12.5 milliGRAM(s) Oral every 12 hours  losartan 50 milliGRAM(s) Oral daily  simvastatin 40 milliGRAM(s) Oral at bedtime    MEDICATIONS  (PRN):      Vital Signs Last 24 Hrs  T(C): 36.7 (02 Nov 2020 00:05), Max: 37.1 (01 Nov 2020 16:18)  T(F): 98.1 (02 Nov 2020 00:05), Max: 98.8 (01 Nov 2020 16:18)  HR: 79 (02 Nov 2020 06:00) (75 - 80)  BP: 119/80 (02 Nov 2020 06:00) (115/77 - 131/77)  BP(mean): --  RR: 18 (02 Nov 2020 00:05) (18 - 18)  SpO2: 98% (02 Nov 2020 00:05) (98% - 98%)      PHYSICAL EXAM  GENERAL: NAD, well-developed  HEAD:  Atraumatic, Normocephalic  EYES: Conjunctiva and sclera clear  CHEST/LUNG: Bibasilar crackles, good airflow.   HEART: Regular rate and rhythm; No murmurs, rubs, or gallops  ABDOMEN: Soft, Nontender, Nondistended; Bowel sounds present  EXTREMITIES:  1+ trace LE edema.       CAPILLARY BLOOD GLUCOSE        I&O's Summary    01 Nov 2020 07:01  -  02 Nov 2020 07:00  --------------------------------------------------------  IN: 0 mL / OUT: 750 mL / NET: -750 mL        LABS:                        10.7   6.83  )-----------( 180      ( 02 Nov 2020 07:02 )             36.9     11-02    145  |  99  |  32<H>  ----------------------------<  97  3.3<L>   |  36<H>  |  1.55<H>    Ca    9.0      02 Nov 2020 07:01  Phos  3.1     11-02  Mg     2.0     11-02

## 2020-11-02 NOTE — PROGRESS NOTE ADULT - ASSESSMENT
81 yo M with a history of chronic renal insufficiency, atrial fibrillation on apixaban, HF with preserved EF, prior CVA (cerebellar) and chronic venous insufficiency.  Presented with AMS/hypoxic resp failure; cardiology consulted for newly reduced EF by echo (35-40%) done on 10/25. Hs Smita negative x2. BNP on admission ~ 5700.       REC:  #1.  Acute congestive HFrEF, likely secondary to hypertensive heart disease   - carvedilol 12.5 BID for better BP control, can increase to 25mg bid   - Volume status much improved, now w/ contraction alkalosis and slight Cr increase; would decrease diuretic to Bumex 1mg daily, replete K to 4.5  - daily weights, I/Os, bid lytes   - transition simvastatin to atorvastatin 40mg   - C/w Eliquis 5mg BID  - C/w losartan 50mg   - Strict I/Os and low salt diet, fluid restriction  - Electrolyte replacement as needed       All Cardiology service information can be found 24/7 on [email or web address] - use password: [email or web address] to log in.

## 2020-11-02 NOTE — PROGRESS NOTE ADULT - PROBLEM SELECTOR PLAN 6
Dispo - pending diuresis and medical management. Anticipate rehab    PCP - Catalino Saldivar  Cardiology - Dr. Rodri Julian ([facility])  Vascular - Dr. [place]

## 2020-11-02 NOTE — CHART NOTE - NSCHARTNOTEFT_GEN_A_CORE
Nutrition Follow Up Note  Patient seen for: Nutrition Follow Up and STAR CHF List Education    Interim events noted, chart reviewed. Noted DC planning in place for rehab. Noted pt continues to be closely monitored.     Source: pt, RN     Diet : Diet, DASH/TLC:   Sodium & Cholesterol Restricted  Consistent Carbohydrate {No Snacks} (CSTCHO)  1200mL Fluid Restriction ([id number]) (10-30-20 @ 10:21)    Patient reports: he has been eating well in house now, has been finishing his meals. Noted per flow sheets, pt has been consuming % of his meals. Pt reports no GI distress at this time.      Daily Weight in k.4 (10-31), Weight in k.8 (10-29), Weight in k.5 (10-28); noted only slight shift in wt since last assessment      Pertinent Medications: MEDICATIONS  (STANDING):  apixaban 5 milliGRAM(s) Oral every 12 hours  aspirin enteric coated 81 milliGRAM(s) Oral daily  buMETAnide 2 milliGRAM(s) Oral daily  carvedilol 12.5 milliGRAM(s) Oral every 12 hours  losartan 50 milliGRAM(s) Oral daily  potassium chloride    Tablet ER 40 milliEquivalent(s) Oral every 4 hours  simvastatin 40 milliGRAM(s) Oral at bedtime    MEDICATIONS  (PRN):    Pertinent Labs:  @ 07:01: Na 145, BUN 32<H>, Cr 1.55<H>, BG 97, K+ 3.3<L>, Phos 3.1, Mg 2.0, Alk Phos --, ALT/SGPT --, AST/SGOT --, HbA1c --    Finger Sticks: None pertinent to address at this time.       Skin per nursing documentation: no pressure injuries   Edema: +1 mo. feet     Estimated Needs:   [x] no change since previous assessment      Previous Nutrition Diagnosis: inadequate PO intake   Nutrition Diagnosis much improved given increase in PO intake     New Nutrition Diagnosis: none at this time       Recommend  1. Continue c current diet.   2. Would continue to recommend Ensure pudding once daily, noted pending order verification in place.   3. Would continue to recommend multivitamin and vitamin dinner supplementation to aid in wound healing.   4. RD provided verbal and written material regarding Heart failure nutrition therapy, low sodium foods list, heart healthy shopping tips and label reading, by [employer].     Monitoring and Evaluation:     Continue to monitor Nutritional intake, Tolerance to diet prescription, weights, labs, skin integrity    RD remains available upon request and will follow up per protocol  Ivelisse Hansen MS RD CDN [facility],  #[phone]

## 2020-11-02 NOTE — DISCHARGE NOTE NURSING/CASE MANAGEMENT/SOCIAL WORK - PATIENT PORTAL LINK FT
You can access the FollowMyHealth Patient Portal offered by [facility] by registering at the following website: [email or web address]. By joining [employer]’s FollowMyHealth portal, you will also be able to view your health information using other applications (apps) compatible with our system.

## 2020-11-02 NOTE — CHART NOTE - NSCHARTNOTEFT_GEN_A_CORE
I spoke to the son of the patient and explained to him the plan. I explained that patient will need continue all of his discharge medications. I also explained to him that he will have to follow up with his PCP and a cardiologist. Son stated that he wants to follow up with [facility] cardiology. We will be making an appointment for follow up in 1-2 weeks.

## 2020-11-02 NOTE — PROGRESS NOTE ADULT - PROBLEM SELECTOR PLAN 1
- Continue diuresis. Switched to PO 11/1 on PO bumex 2 mg daily.  - Attempt wean to room air  - Strict I&Os  - Daily weight  - Aim for net 1-2 L negative  - Continue Losartan/ Pt on 12.5mg Coreg  - TTE on Oct 25 with EF of 35-40% (compared to 65% in February)  - Cards following, appreciate recs - Continue diuresis. Switched to PO 11/1 on PO bumex 2 mg daily.  - Attempt wean to room air  - Strict I&Os  - Daily weight  - Aim for net 1-2 L negative  - Continue Losartan/ Pt on 12.5mg Coreg  - TTE on Oct 25 with EF of 35-40% (compared to 65% in February)  - Cards following, appreciate recs  - f/u appointment made for pts: PCP Dr. Saldivar on 11/9 11:30 AM; Cardiology Dr. Siria Jacobs 11/10 10 AM

## 2020-11-02 NOTE — PROGRESS NOTE ADULT - NSHPATTENDINGPLANDISCUSS_GEN_ALL_CORE
cardiology fellow; patient seen and examined.       I was physically present for the key portions of the evaluation and management (E/M) service provided.    I agree with the above history, physical, and plan which I have reviewed and edited where appropriate.
Team 2
Team 2

## 2020-11-02 NOTE — PROGRESS NOTE ADULT - ATTENDING COMMENTS
Patient seen and examined, labs and vitals are reviewed. Chart reviewed   79 Y/O/M PMHx of Chronic A.Fib on Eliquis, PE, Cerebellar CVA, Bladder Ca, BPH, CKD2, PVD initially admitted to MICU with Altered Mental Status, and acute Hypoxic Hypercarbic Respiratory Failure secondary acute systolic heart failure now feels much improved   clinically euvolemic   on  PO  Bumex   creatinine is stable   on Eliquis  , Coreg and Losartan , medically optimize, HD stable   continue ACE bandage in LE  PT- ANDREA  family is fully aware  patient has an appointment with cardiology within a week Patient seen and examined, labs and vitals are reviewed. Chart reviewed   79 Y/O/M PMHx of Chronic A.Fib on Eliquis, PE, Cerebellar CVA, Bladder Ca, BPH, CKD2, PVD initially admitted to MICU with Altered Mental Status, and acute Hypoxic Hypercarbic Respiratory Failure secondary acute systolic heart failure now feels much improved   clinically euvolemic   on  PO  Bumex   creatinine is stable at 1.5   mild metabolic alkalosis which is stable  hypokalemia - K supplemented   on Eliquis  , Coreg and Losartan , medically optimize, HD stable   continue ACE bandage in LE  PT- ANDREA  family is fully aware  Patient has an appointment with cardiology within a week   Discharge time 40 minutes

## 2020-11-03 ENCOUNTER — NON-APPOINTMENT (OUTPATIENT)
Age: 80
End: 2020-11-03

## 2020-11-09 ENCOUNTER — APPOINTMENT (OUTPATIENT)
Dept: INTERNAL MEDICINE | Facility: CLINIC | Age: 80
End: 2020-11-09

## 2020-11-10 ENCOUNTER — APPOINTMENT (OUTPATIENT)
Dept: CARDIOLOGY | Facility: CLINIC | Age: 80
End: 2020-11-10
Payer: MEDICARE

## 2020-11-10 ENCOUNTER — NON-APPOINTMENT (OUTPATIENT)
Age: 80
End: 2020-11-10

## 2020-11-10 VITALS — SYSTOLIC BLOOD PRESSURE: 118 MMHG | DIASTOLIC BLOOD PRESSURE: 76 MMHG | OXYGEN SATURATION: 96 % | HEART RATE: 73 BPM

## 2020-11-10 PROCEDURE — 99204 OFFICE O/P NEW MOD 45 MIN: CPT

## 2020-11-10 RX ORDER — FEBUXOSTAT 40 MG/1
40 TABLET ORAL DAILY
Qty: 90 | Refills: 3 | Status: DISCONTINUED | OUTPATIENT
Start: 2020-05-18 | End: 2020-11-10

## 2020-11-10 RX ORDER — TERAZOSIN 2 MG/1
2 CAPSULE ORAL
Refills: 0 | Status: DISCONTINUED | COMMUNITY
End: 2020-11-10

## 2020-11-10 RX ORDER — ENALAPRIL MALEATE 20 MG/1
20 TABLET ORAL
Refills: 0 | Status: DISCONTINUED | COMMUNITY
End: 2020-11-10

## 2020-11-10 RX ORDER — TORSEMIDE 20 MG/1
20 TABLET ORAL DAILY
Qty: 180 | Refills: 3 | Status: DISCONTINUED | COMMUNITY
Start: 2020-10-16 | End: 2020-11-10

## 2020-11-10 RX ORDER — ASPIRIN ENTERIC COATED TABLETS 81 MG 81 MG/1
81 TABLET, DELAYED RELEASE ORAL
Refills: 6 | Status: ACTIVE | COMMUNITY
Start: 2020-11-10

## 2020-11-10 RX ORDER — NORMAL SALT TABLETS 1 G/G
1 TABLET ORAL EVERY 8 HOURS
Qty: 30 | Refills: 0 | Status: DISCONTINUED | COMMUNITY
Start: 2020-06-08 | End: 2020-11-10

## 2020-11-10 NOTE — PHYSICAL EXAM
[General Appearance - Well Developed] : well developed [Normal Appearance] : normal appearance [Well Groomed] : well groomed [General Appearance - Well Nourished] : well nourished [No Deformities] : no deformities [General Appearance - In No Acute Distress] : no acute distress [Normal Conjunctiva] : the conjunctiva exhibited no abnormalities [Eyelids - No Xanthelasma] : the eyelids demonstrated no xanthelasmas [Normal Oral Mucosa] : normal oral mucosa [No Oral Pallor] : no oral pallor [No Oral Cyanosis] : no oral cyanosis [Normal Jugular Venous A Waves Present] : normal jugular venous A waves present [Normal Jugular Venous V Waves Present] : normal jugular venous V waves present [No Jugular Venous Stone A Waves] : no jugular venous stone A waves [Heart Sounds] : normal S1 and S2 [Murmurs] : no murmurs present [Irregularly Irregular] : the rhythm was irregularly irregular [Respiration, Rhythm And Depth] : normal respiratory rhythm and effort [Exaggerated Use Of Accessory Muscles For Inspiration] : no accessory muscle use [Auscultation Breath Sounds / Voice Sounds] : lungs were clear to auscultation bilaterally [Abdomen Soft] : soft [Abdomen Tenderness] : non-tender [Abdomen Mass (___ Cm)] : no abdominal mass palpated [Abnormal Walk] : normal gait [Gait - Sufficient For Exercise Testing] : the gait was sufficient for exercise testing [Nail Clubbing] : no clubbing of the fingernails [Cyanosis, Localized] : no localized cyanosis [Petechial Hemorrhages (___cm)] : no petechial hemorrhages [Skin Color & Pigmentation] : normal skin color and pigmentation [] : no rash [No Venous Stasis] : no venous stasis [Skin Lesions] : no skin lesions [No Skin Ulcers] : no skin ulcer [No Xanthoma] : no  xanthoma was observed [Oriented To Time, Place, And Person] : oriented to person, place, and time [Affect] : the affect was normal [Mood] : the mood was normal [No Anxiety] : not feeling anxious

## 2020-11-10 NOTE — DISCUSSION/SUMMARY
[FreeTextEntry1] : The patient is a 80-year-old [place] gentleman CVA, CKD, BPH, afib, PVD, HFrEF now in [facility].\par #1 CV- moderate global LV dysfunction may be secondary to uncontrolled afib, hold on cardiac cath at present secondary to in [facility] and gradually improving\par #2 HFrEF- global LV dysfunction, euvolemic on bumex 2mg daily\par #3 Htn- on coreg and losartan\par #4 Afib- now on eliquis 5mg bid, repeat ECHO three months\par #5 CKD- repeat labs\par #6 BPH- on tamsulosin\par #7 PVD- wounds healing, has appt with vascular\par #8 CVA- at [facility], unable to walk

## 2020-11-10 NOTE — HISTORY OF PRESENT ILLNESS
[FreeTextEntry1] : Tim is a 80-year-old gentleman htn, afib, HF, CKD, CVA, Bladder Ca, PVD who was in hospital secondary to confusion. He was in respiratory distress and was intubated for one day. Decrease LV and RV function compared to August. He is in [facility] on nasal cannula occasionally. He feels a lot better. 10/24-11/2 in hospital. He had been falling a lot even before this happened. He is upset about prior nuclear stress results. He was not told about afib.

## 2020-11-10 NOTE — REVIEW OF SYSTEMS
[Shortness Of Breath] : shortness of breath [Dyspnea on exertion] : not dyspnea during exertion [Chest Pain] : no chest pain [Lower Ext Edema] : lower extremity edema [Palpitations] : palpitations [Negative] : Heme/Lymph

## 2020-11-25 ENCOUNTER — RESULT REVIEW (OUTPATIENT)
Age: 80
End: 2020-11-25

## 2020-12-10 ENCOUNTER — TRANSCRIPTION ENCOUNTER (OUTPATIENT)
Age: 80
End: 2020-12-10

## 2020-12-14 ENCOUNTER — APPOINTMENT (OUTPATIENT)
Dept: CARDIOLOGY | Facility: CLINIC | Age: 80
End: 2020-12-14

## 2020-12-23 ENCOUNTER — APPOINTMENT (OUTPATIENT)
Dept: WOUND CARE | Facility: CLINIC | Age: 80
End: 2020-12-23

## 2020-12-23 PROBLEM — Z87.440 HISTORY OF URINARY TRACT INFECTION: Status: RESOLVED | Noted: 2020-02-26 | Resolved: 2020-12-23

## 2021-01-07 ENCOUNTER — APPOINTMENT (OUTPATIENT)
Dept: CARDIOLOGY | Facility: CLINIC | Age: 81
End: 2021-01-07

## 2021-01-11 ENCOUNTER — APPOINTMENT (OUTPATIENT)
Dept: WOUND CARE | Facility: CLINIC | Age: 81
End: 2021-01-11

## 2021-01-15 ENCOUNTER — RESULT REVIEW (OUTPATIENT)
Age: 81
End: 2021-01-15

## 2021-02-11 ENCOUNTER — APPOINTMENT (OUTPATIENT)
Dept: CARDIOLOGY | Facility: CLINIC | Age: 81
End: 2021-02-11
Payer: MEDICARE

## 2021-02-11 ENCOUNTER — NON-APPOINTMENT (OUTPATIENT)
Age: 81
End: 2021-02-11

## 2021-02-11 VITALS
RESPIRATION RATE: 13 BRPM | BODY MASS INDEX: 31.5 KG/M2 | HEART RATE: 88 BPM | TEMPERATURE: 97.2 F | HEIGHT: 66 IN | SYSTOLIC BLOOD PRESSURE: 120 MMHG | WEIGHT: 196 LBS | DIASTOLIC BLOOD PRESSURE: 74 MMHG | OXYGEN SATURATION: 86 %

## 2021-02-11 PROCEDURE — 99214 OFFICE O/P EST MOD 30 MIN: CPT

## 2021-02-11 NOTE — PHYSICAL EXAM
[General Appearance - Well Developed] : well developed [Normal Appearance] : normal appearance [Well Groomed] : well groomed [General Appearance - Well Nourished] : well nourished [No Deformities] : no deformities [General Appearance - In No Acute Distress] : no acute distress [Normal Conjunctiva] : the conjunctiva exhibited no abnormalities [Eyelids - No Xanthelasma] : the eyelids demonstrated no xanthelasmas [Normal Oral Mucosa] : normal oral mucosa [No Oral Pallor] : no oral pallor [No Oral Cyanosis] : no oral cyanosis [Normal Jugular Venous A Waves Present] : normal jugular venous A waves present [Normal Jugular Venous V Waves Present] : normal jugular venous V waves present [No Jugular Venous Stone A Waves] : no jugular venous stone A waves [Respiration, Rhythm And Depth] : normal respiratory rhythm and effort [Exaggerated Use Of Accessory Muscles For Inspiration] : no accessory muscle use [Auscultation Breath Sounds / Voice Sounds] : lungs were clear to auscultation bilaterally [Heart Sounds] : normal S1 and S2 [Murmurs] : no murmurs present [Irregularly Irregular] : the rhythm was irregularly irregular [Abdomen Soft] : soft [Abdomen Tenderness] : non-tender [Abdomen Mass (___ Cm)] : no abdominal mass palpated [Abnormal Walk] : normal gait [Gait - Sufficient For Exercise Testing] : the gait was sufficient for exercise testing [Nail Clubbing] : no clubbing of the fingernails [Cyanosis, Localized] : no localized cyanosis [Petechial Hemorrhages (___cm)] : no petechial hemorrhages [Skin Color & Pigmentation] : normal skin color and pigmentation [] : no rash [No Venous Stasis] : no venous stasis [Skin Lesions] : no skin lesions [No Skin Ulcers] : no skin ulcer [No Xanthoma] : no  xanthoma was observed [Oriented To Time, Place, And Person] : oriented to person, place, and time [Affect] : the affect was normal [Mood] : the mood was normal [No Anxiety] : not feeling anxious

## 2021-02-12 NOTE — DISCUSSION/SUMMARY
[___ Month(s)] : [unfilled] month(s) [FreeTextEntry1] : The patient is a 80-year-old [place] gentleman CVA, CKD, BPH, afib, PVD, HFrEF discharged from [facility].\par #1 CV- moderate global LV dysfunction may be secondary to uncontrolled afib, bilateral LE edema\par #2 HFrEF- global LV dysfunction, fluid overload, increase bumex 2mg 2 pills twice a day and daily weight to see if improvement.\par #3 Htn- on coreg and losartan\par #4 Afib- on eliquis 5mg bid, repeat ECHO three months\par #5 CKD- repeat labs\par #6 BPH- on tamsulosin\par #7 PVD- wounds healing, has appt with vascular wound doctor, needs appt with Dr. Lara before next visit here. \par #8 CVA- at [facility], unable to walk

## 2021-02-12 NOTE — REVIEW OF SYSTEMS
[Shortness Of Breath] : shortness of breath [Lower Ext Edema] : lower extremity edema [Palpitations] : palpitations [Negative] : Heme/Lymph [Dyspnea on exertion] : not dyspnea during exertion [Chest Pain] : no chest pain

## 2021-02-12 NOTE — HISTORY OF PRESENT ILLNESS
[FreeTextEntry1] : Tim is a 80-year-old gentleman htn, afib, HF, CKD, CVA, Bladder Ca, PVD who was in hospital secondary to confusion. He was in respiratory distress and was intubated for one day. Decrease LV and RV function compared to August. He was just discharged from [facility] and here with is son. He has not been weighing himself but lower extremities very edematous.

## 2021-02-14 ENCOUNTER — TRANSCRIPTION ENCOUNTER (OUTPATIENT)
Age: 81
End: 2021-02-14

## 2021-02-15 ENCOUNTER — NON-APPOINTMENT (OUTPATIENT)
Age: 81
End: 2021-02-15

## 2021-02-19 ENCOUNTER — TRANSCRIPTION ENCOUNTER (OUTPATIENT)
Age: 81
End: 2021-02-19

## 2021-02-19 ENCOUNTER — NON-APPOINTMENT (OUTPATIENT)
Age: 81
End: 2021-02-19

## 2021-02-22 ENCOUNTER — APPOINTMENT (OUTPATIENT)
Dept: WOUND CARE | Facility: CLINIC | Age: 81
End: 2021-02-22

## 2021-03-08 ENCOUNTER — TRANSCRIPTION ENCOUNTER (OUTPATIENT)
Age: 81
End: 2021-03-08

## 2021-03-11 ENCOUNTER — APPOINTMENT (OUTPATIENT)
Dept: CARDIOLOGY | Facility: CLINIC | Age: 81
End: 2021-03-11
Payer: MEDICARE

## 2021-03-11 ENCOUNTER — NON-APPOINTMENT (OUTPATIENT)
Age: 81
End: 2021-03-11

## 2021-03-11 VITALS
BODY MASS INDEX: 31.34 KG/M2 | SYSTOLIC BLOOD PRESSURE: 112 MMHG | TEMPERATURE: 97.7 F | OXYGEN SATURATION: 90 % | HEIGHT: 66 IN | WEIGHT: 195 LBS | RESPIRATION RATE: 14 BRPM | HEART RATE: 95 BPM | DIASTOLIC BLOOD PRESSURE: 64 MMHG

## 2021-03-11 PROCEDURE — 99215 OFFICE O/P EST HI 40 MIN: CPT

## 2021-03-11 RX ORDER — FEBUXOSTAT 40 MG/1
40 TABLET ORAL
Qty: 90 | Refills: 3 | Status: DISCONTINUED | COMMUNITY
Start: 2020-04-27 | End: 2021-03-11

## 2021-03-11 RX ORDER — LOSARTAN POTASSIUM 100 MG/1
100 TABLET, FILM COATED ORAL
Qty: 90 | Refills: 1 | Status: DISCONTINUED | COMMUNITY
Start: 2020-11-10 | End: 2021-03-11

## 2021-03-12 LAB
ANION GAP SERPL CALC-SCNC: 9 MMOL/L
BASOPHILS # BLD AUTO: 0.04 K/UL
BASOPHILS NFR BLD AUTO: 0.7 %
BUN SERPL-MCNC: 49 MG/DL
CALCIUM SERPL-MCNC: 9 MG/DL
CHLORIDE SERPL-SCNC: 96 MMOL/L
CO2 SERPL-SCNC: 40 MMOL/L
CREAT SERPL-MCNC: 1.88 MG/DL
EOSINOPHIL # BLD AUTO: 0.3 K/UL
EOSINOPHIL NFR BLD AUTO: 5.4 %
GLUCOSE SERPL-MCNC: 107 MG/DL
HCT VFR BLD CALC: 27.9 %
HGB BLD-MCNC: 8 G/DL
IMM GRANULOCYTES NFR BLD AUTO: 0.2 %
LYMPHOCYTES # BLD AUTO: 0.87 K/UL
LYMPHOCYTES NFR BLD AUTO: 15.7 %
MAGNESIUM SERPL-MCNC: 1.8 MG/DL
MAN DIFF?: NORMAL
MCHC RBC-ENTMCNC: 24.9 PG
MCHC RBC-ENTMCNC: 28.7 GM/DL
MCV RBC AUTO: 86.9 FL
MONOCYTES # BLD AUTO: 0.61 K/UL
MONOCYTES NFR BLD AUTO: 11 %
NEUTROPHILS # BLD AUTO: 3.72 K/UL
NEUTROPHILS NFR BLD AUTO: 67 %
NT-PROBNP SERPL-MCNC: 3467 PG/ML
PLATELET # BLD AUTO: 150 K/UL
POTASSIUM SERPL-SCNC: 4 MMOL/L
RBC # BLD: 3.21 M/UL
RBC # FLD: 18.6 %
SODIUM SERPL-SCNC: 145 MMOL/L
WBC # FLD AUTO: 5.55 K/UL

## 2021-03-12 NOTE — PHYSICAL EXAM
[General Appearance - Well Developed] : well developed [Normal Appearance] : normal appearance [Well Groomed] : well groomed [General Appearance - Well Nourished] : well nourished [No Deformities] : no deformities [General Appearance - In No Acute Distress] : no acute distress [Normal Conjunctiva] : the conjunctiva exhibited no abnormalities [Eyelids - No Xanthelasma] : the eyelids demonstrated no xanthelasmas [Normal Oral Mucosa] : normal oral mucosa [No Oral Pallor] : no oral pallor [No Oral Cyanosis] : no oral cyanosis [Normal Jugular Venous A Waves Present] : normal jugular venous A waves present [Normal Jugular Venous V Waves Present] : normal jugular venous V waves present [No Jugular Venous Stone A Waves] : no jugular venous stone A waves [Respiration, Rhythm And Depth] : normal respiratory rhythm and effort [Exaggerated Use Of Accessory Muscles For Inspiration] : no accessory muscle use [Auscultation Breath Sounds / Voice Sounds] : lungs were clear to auscultation bilaterally [Heart Sounds] : normal S1 and S2 [Murmurs] : no murmurs present [Irregularly Irregular] : the rhythm was irregularly irregular [Abdomen Soft] : soft [Abdomen Tenderness] : non-tender [Abdomen Mass (___ Cm)] : no abdominal mass palpated [Abnormal Walk] : normal gait [Gait - Sufficient For Exercise Testing] : the gait was sufficient for exercise testing [Nail Clubbing] : no clubbing of the fingernails [Cyanosis, Localized] : no localized cyanosis [Petechial Hemorrhages (___cm)] : no petechial hemorrhages [Skin Color & Pigmentation] : normal skin color and pigmentation [] : no rash [No Venous Stasis] : no venous stasis [Skin Lesions] : no skin lesions [No Skin Ulcers] : no skin ulcer [No Xanthoma] : no  xanthoma was observed [Oriented To Time, Place, And Person] : oriented to person, place, and time [Affect] : the affect was normal [Mood] : the mood was normal [No Anxiety] : not feeling anxious [FreeTextEntry1] : both legs wrapped with increase edema and blisters

## 2021-03-12 NOTE — DISCUSSION/SUMMARY
[___ Month(s)] : [unfilled] month(s) [FreeTextEntry1] : The patient is a 81-year-old [place] gentleman CVA, CKD, BPH, afib, PVD, HFrEF discharged from [facility] with worsening edema since discharge.\par #1 CV- moderate global LV dysfunction may be secondary to uncontrolled afib, bilateral LE edema\par #2 HFrEF- global LV dysfunction, fluid overload,continue bumex 2mg 2 pills twice a day and daily weight to see if improvement, labs today and consider metolazone.\par #3 Htn- continue coreg and losartan\par #4 Afib- continue eliquis 5mg bid\par #5 CKD- repeat labs\par #6 BPH- continue tamsulosin\par #7 PVD- wounds healing, has appt with vascular wound doctor, needs appt with Dr. Lara before next visit here. \par #8 CVA- unable to walk

## 2021-03-12 NOTE — HISTORY OF PRESENT ILLNESS
[FreeTextEntry1] : Tim is a 80-year-old gentleman htn, afib, HF, CKD, CVA, Bladder Ca, PVD who was in hospital secondary to confusion. He was in respiratory distress and was intubated for one day. Decrease LV and RV function compared to August. He was just discharged from [facility] and here with is son. He has not been weighing himself but lower extremities very edematous.

## 2021-03-16 ENCOUNTER — APPOINTMENT (OUTPATIENT)
Dept: INTERNAL MEDICINE | Facility: CLINIC | Age: 81
End: 2021-03-16
Payer: MEDICARE

## 2021-03-16 VITALS
TEMPERATURE: 97.3 F | DIASTOLIC BLOOD PRESSURE: 55 MMHG | HEART RATE: 77 BPM | OXYGEN SATURATION: 98 % | SYSTOLIC BLOOD PRESSURE: 111 MMHG

## 2021-03-16 VITALS — BODY MASS INDEX: 29.7 KG/M2 | WEIGHT: 184 LBS

## 2021-03-16 DIAGNOSIS — Z09 ENCOUNTER FOR FOLLOW-UP EXAMINATION AFTER COMPLETED TREATMENT FOR CONDITIONS OTHER THAN MALIGNANT NEOPLASM: ICD-10-CM

## 2021-03-16 PROCEDURE — 36415 COLL VENOUS BLD VENIPUNCTURE: CPT

## 2021-03-16 PROCEDURE — 99496 TRANSJ CARE MGMT HIGH F2F 7D: CPT | Mod: 25

## 2021-03-17 ENCOUNTER — APPOINTMENT (OUTPATIENT)
Dept: WOUND CARE | Facility: CLINIC | Age: 81
End: 2021-03-17
Payer: MEDICARE

## 2021-03-17 VITALS
RESPIRATION RATE: 16 BRPM | HEART RATE: 65 BPM | TEMPERATURE: 96 F | SYSTOLIC BLOOD PRESSURE: 95 MMHG | DIASTOLIC BLOOD PRESSURE: 54 MMHG

## 2021-03-17 DIAGNOSIS — E87.70 FLUID OVERLOAD, UNSPECIFIED: ICD-10-CM

## 2021-03-17 DIAGNOSIS — L30.8 OTHER SPECIFIED DERMATITIS: ICD-10-CM

## 2021-03-17 DIAGNOSIS — L97.919 NON-PRESSURE CHRONIC ULCER OF UNSPECIFIED PART OF RIGHT LOWER LEG WITH UNSPECIFIED SEVERITY: ICD-10-CM

## 2021-03-17 DIAGNOSIS — L97.929 NON-PRESSURE CHRONIC ULCER OF UNSPECIFIED PART OF RIGHT LOWER LEG WITH UNSPECIFIED SEVERITY: ICD-10-CM

## 2021-03-17 LAB
25(OH)D3 SERPL-MCNC: 59.5 NG/ML
ALBUMIN SERPL ELPH-MCNC: 3.8 G/DL
ALP BLD-CCNC: 119 U/L
ALT SERPL-CCNC: 23 U/L
ANION GAP SERPL CALC-SCNC: 10 MMOL/L
AST SERPL-CCNC: 26 U/L
BASOPHILS # BLD AUTO: 0.05 K/UL
BASOPHILS NFR BLD AUTO: 0.8 %
BILIRUB SERPL-MCNC: 0.5 MG/DL
BUN SERPL-MCNC: 66 MG/DL
CALCIUM SERPL-MCNC: 9.3 MG/DL
CHLORIDE SERPL-SCNC: 88 MMOL/L
CO2 SERPL-SCNC: 47 MMOL/L
CREAT SERPL-MCNC: 2.03 MG/DL
EOSINOPHIL # BLD AUTO: 0.3 K/UL
EOSINOPHIL NFR BLD AUTO: 4.9 %
ESTIMATED AVERAGE GLUCOSE: 126 MG/DL
FERRITIN SERPL-MCNC: 61 NG/ML
FOLATE SERPL-MCNC: >20 NG/ML
GLUCOSE SERPL-MCNC: 142 MG/DL
HBA1C MFR BLD HPLC: 6 %
HCT VFR BLD CALC: 27.7 %
HGB BLD-MCNC: 7.8 G/DL
IMM GRANULOCYTES NFR BLD AUTO: 0.3 %
IRON SATN MFR SERPL: 12 %
IRON SERPL-MCNC: 40 UG/DL
LYMPHOCYTES # BLD AUTO: 0.88 K/UL
LYMPHOCYTES NFR BLD AUTO: 14.4 %
MAN DIFF?: NORMAL
MCHC RBC-ENTMCNC: 25 PG
MCHC RBC-ENTMCNC: 28.2 GM/DL
MCV RBC AUTO: 88.8 FL
MONOCYTES # BLD AUTO: 0.59 K/UL
MONOCYTES NFR BLD AUTO: 9.6 %
NEUTROPHILS # BLD AUTO: 4.29 K/UL
NEUTROPHILS NFR BLD AUTO: 70 %
NT-PROBNP SERPL-MCNC: 3877 PG/ML
PLATELET # BLD AUTO: 149 K/UL
POTASSIUM SERPL-SCNC: 3.2 MMOL/L
PROT SERPL-MCNC: 7 G/DL
RBC # BLD: 3.12 M/UL
RBC # FLD: 19.2 %
SODIUM SERPL-SCNC: 144 MMOL/L
TIBC SERPL-MCNC: 332 UG/DL
UIBC SERPL-MCNC: 293 UG/DL
VIT B12 SERPL-MCNC: 512 PG/ML
WBC # FLD AUTO: 6.13 K/UL

## 2021-03-17 PROCEDURE — 99213 OFFICE O/P EST LOW 20 MIN: CPT

## 2021-03-18 DIAGNOSIS — R79.89 OTHER SPECIFIED ABNORMAL FINDINGS OF BLOOD CHEMISTRY: ICD-10-CM

## 2021-03-18 DIAGNOSIS — M62.838 OTHER MUSCLE SPASM: ICD-10-CM

## 2021-03-20 ENCOUNTER — TRANSCRIPTION ENCOUNTER (OUTPATIENT)
Age: 81
End: 2021-03-20

## 2021-03-22 RX ORDER — POTASSIUM CHLORIDE 1500 MG/1
20 TABLET, EXTENDED RELEASE ORAL
Qty: 360 | Refills: 3 | Status: DISCONTINUED | COMMUNITY
Start: 2020-11-10 | End: 2021-03-22

## 2021-03-24 NOTE — HISTORY OF PRESENT ILLNESS
[Post-hospitalization from ___ Hospital] : Post-hospitalization from [unfilled] Hospital [Admitted on: ___] : The patient was admitted on [unfilled] [Discharged on ___] : discharged on [unfilled] [Discharge Summary] : discharge summary [Pertinent Labs] : pertinent labs [Radiology Findings] : radiology findings [Discharge Med List] : discharge medication list [Med Reconciliation] : medication reconciliation has been completed [Patient Contacted By: ____] : and contacted by [unfilled] [FreeTextEntry2] : Patient presents for hospital discharge follow-up, discharge for CHF exacerbation and altered mental status took wife's medications, subsequently was retaining CO2 and was placed on BiPAP.  Following with cardiology will be following up with wound care for sacral ulcer and lower extremity wounds.  Incidentally was found to have adrenal nodule, has been weighing himself daily and weight has remained stable

## 2021-03-24 NOTE — ASSESSMENT
[FreeTextEntry1] : Tom for hospital discharge follow-up check hemoglobin A1c proBNP today blood pressure stable was found to have adrenal nodule and to repeat it in 1 year, assess sodium levels electrolytes given diuretic use, also patient does have anemia possibly iron deficiency anemia versus anemia of chronic disease, blood work drawn to assess iron levels today.  Continue current diuretic management to follow-up with cardiology

## 2021-03-24 NOTE — REVIEW OF SYSTEMS
[Negative] : Heme/Lymph [FreeTextEntry6] : Decreased breath sounds at the bases [de-identified] : Sacral wounds and lower extremity wounds

## 2021-03-24 NOTE — PHYSICAL EXAM
[Normal] : no CVA or spinal tenderness [de-identified] : Decreased breath sounds at the bases [66663 - High Complexity requires an extensive number of possible diagnoses and/or the management options, extensive complexity of the medical data (tests, etc.) to be reviewed, and a high risk of significant complications, morbidity, and/or mortality as w] : High Complexity

## 2021-03-31 ENCOUNTER — APPOINTMENT (OUTPATIENT)
Dept: WOUND CARE | Facility: CLINIC | Age: 81
End: 2021-03-31

## 2021-04-07 ENCOUNTER — APPOINTMENT (OUTPATIENT)
Dept: PULMONOLOGY | Facility: CLINIC | Age: 81
End: 2021-04-07
Payer: MEDICARE

## 2021-04-07 VITALS
TEMPERATURE: 97.7 F | HEIGHT: 66 IN | WEIGHT: 165 LBS | BODY MASS INDEX: 26.52 KG/M2 | OXYGEN SATURATION: 99 % | HEART RATE: 66 BPM

## 2021-04-07 PROCEDURE — 99204 OFFICE O/P NEW MOD 45 MIN: CPT

## 2021-04-07 NOTE — PHYSICAL EXAM
[No Acute Distress] : no acute distress [Normal Oropharynx] : normal oropharynx [Normal Appearance] : normal appearance [No Neck Mass] : no neck mass [Normal Rate/Rhythm] : normal rate/rhythm [Normal S1, S2] : normal s1, s2 [No Murmurs] : no murmurs [No Resp Distress] : no resp distress [Rales] : rales [No Abnormalities] : no abnormalities [Benign] : benign [Normal Gait] : normal gait [No Clubbing] : no clubbing [No Cyanosis] : no cyanosis [No Edema] : no edema [FROM] : FROM [Normal Color/ Pigmentation] : normal color/ pigmentation [No Focal Deficits] : no focal deficits [Oriented x3] : oriented x3 [Normal Affect] : normal affect

## 2021-04-07 NOTE — HISTORY OF PRESENT ILLNESS
[Former] : former [Never] : never [TextBox_4] : Patient is a 81-year-old male with past medical history significant for COPD, congestive heart failure, atherosclerotic heart disease, obstructive sleep apnea on CPAP, congestive heart failure, hypertension, history of pulmonary emboli and COVID-19 pneumonia who has had multiple recent hospital admissions for change in mental status and hypercarbia necessitating intubation and mechanical ventilation who presents today for initial evaluation.  The patient is a poor historian and the history is from his son.  The patient states that he is compliant with his medications.  He is a former smoker he denies fever chills chest pain weight loss or hemoptysis

## 2021-04-07 NOTE — REVIEW OF SYSTEMS
[Fever] : no fever [Fatigue] : fatigue [Chills] : no chills [Poor Appetite] : poor appetite [Cough] : cough [Hemoptysis] : no hemoptysis [Chest Tightness] : no chest tightness [Sputum] : sputum [Dyspnea] : dyspnea [Pleuritic Pain] : no pleuritic pain [Wheezing] : wheezing [A.M. Dry Mouth] : a.m. dry mouth [SOB on Exertion] : sob on exertion [Edema] : edema [Negative] : Endocrine

## 2021-04-07 NOTE — ASSESSMENT
[FreeTextEntry1] : In summary the patient is a 81-year-old male with multiple medical problems including COPD, congestive heart failure, obstructive sleep apnea, hypertension status post COVID-19 pneumonia on home oxygen and BiPAP who presents for initial evaluation.  The patient's physical exam is significant for decreased breath sounds bilaterally with bibasilar rales.  I am starting the patient on Stiolto.  I have instructed his son to continue his portable oxygen and nocturnal BiPAP.  On discussion the son informed me that his father is now compliant with BiPAP.  I instructed the son to use the BiPAP even during the day to help improve with the patient's hypercapnia.  The patient is instructed to continue current therapy and follow-up in 1 month

## 2021-04-07 NOTE — REASON FOR VISIT
[Consultation] : a consultation [Sleep Apnea] : sleep apnea [Cough] : cough [COPD] : COPD [Pulmonary Embolism] : pulmonary embolism [Shortness of Breath] : shortness of breath [Family Member] : family member

## 2021-04-13 ENCOUNTER — LABORATORY RESULT (OUTPATIENT)
Age: 81
End: 2021-04-13

## 2021-04-13 PROBLEM — E87.70 HYPERVOLEMIA: Status: ACTIVE | Noted: 2021-03-17

## 2021-04-13 PROBLEM — L30.8 DERMATITIS ASSOCIATED WITH MOISTURE: Status: ACTIVE | Noted: 2021-03-17

## 2021-04-13 NOTE — PHYSICAL EXAM
[2+] : left 2+ [Ankle Swelling (On Exam)] : present [Skin Ulcer] : ulcer [Alert] : alert [Oriented to Person] : oriented to person [Oriented to Place] : oriented to place [Oriented to Time] : oriented to time [Calm] : calm [Please See PDF for [employer]] : Please See PDF for [employer]. [Ankle Swelling Bilaterally] : severe [JVD] : no jugular venous distention  [Abdomen Tenderness] : ~T ~M No abdominal tenderness [Purpura] : no purpura  [Petechiae] : no petechiae [Skin Induration] : no induration [de-identified] : NAD [de-identified] : not labored [de-identified] : in WC , transfers chair to bed with maximal assistance [de-identified] : conversant with staff and son [FreeTextEntry1] : bilateral buttocks- dermatitis [de-identified] : moisture [de-identified] : instructions for VN given\par son instructed to obtain incontinence pads and d/c diapers\par \par Apply Triad [TWNoteComboBox6] : Other [de-identified] : None

## 2021-04-13 NOTE — HISTORY OF PRESENT ILLNESS
[FreeTextEntry1] : Mr. NICOLLE COBB   presents to the office with a wound for 3 weeks duration after not taking his Lasix for a week..  The wounds are located on  the bilateral legs .  The patient has complaints of swelling.   The patient has been dressing the wound with Xeroform, gauze, velvet, and an Ace wrap.  The patient denies fevers or chills.  The patient has localized pain to the wound upon dressing changes.  The patient has no other complaints or associated symptoms.  HbA1c is 6.8 from May 7th.\par \par Saw his vascular doctor 45 days ago as per Patient.\par 3/17/21- here with son , Constantino who reports recent 3 week discharge from [facility] for what appears to have been  heart and renal  failure related fluid overload \par Patient and son report that he has been compliant with diuretics . He weighs himself , and was advise to report to cardiologist any weight gain of more than 2 pounds in a day\par he continued to work as a  until 6 months ago.\par He was also discharged with a buttock wound\par

## 2021-04-13 NOTE — REVIEW OF SYSTEMS
[As Noted in HPI] : as noted in HPI [Limb Swelling] : limb swelling [Skin Wound] : skin wound [Negative] : Heme/Lymph [FreeTextEntry5] : Hx HTN [FreeTextEntry8] : Hx bladder cancer [FreeTextEntry9] : Hx Gout and Arthritis

## 2021-04-13 NOTE — ASSESSMENT
[FreeTextEntry1] : Mr. NICOLLE COBB is a 80 year with persistent and worsening  right lower extremity venous insufficiency, CEAP classification C 6 rehospitalixed 1 week prior for chf / fluiid over load \par medications changes done in hospital \par Treatment is indicated not for cosmetic reasons but for symptomatic venous reflux disease with symptoms which is refractory to conservative therapy. Venous duplex study demonstrates right  lower extremity venous insufficiency. The need for definitive effective treatment is based on severe, interfering and worsening reflux symptoms with evidence of venous insufficiency on venous ultrasound. \par Plan:\par Apply lidocaine or topical anesthetic if needed to reduce pain upon washing the wound.\par Wash wound with ----0.9% saline or Dove skin sensitive soap and clean water \par Apply aquacel kurlex ace to rt le \par lt le kurlex ace \par moisture inc associated derm to gluteal fold \par use of triad ointment removed daily with baby oil wash with soap and water apply thin layer of triad \par Change dressing 2  times per week.\par Leg elevation as tolerated\par Encouraged ambulation or exercise.\par Optimization of nutrition.\par Offloading to the wound site.\par \par Wound supplies ordered via DME\par Patient given contact information to DME\par \par Homecare orders placed\par \par Follow up appointment scheduled for 1 week in office\par \par TeleHealth Services discussed with the patient and/or family.  Discharge instructions given including download of [facility] information regarding:\par 1)  [facility] to obtain medical records\par 2)  [facility] to conduct Face-to-Face TeleHealth visit\par 3)  [employer] for the Patient (patient takes a picture of their wound which is sent to the patient's chart for review)\par \par \par Patient is a candidate for endovenous ablation treatment of the right  SSV. \par The risks and benefits of endovenous ablation treatment versus continued conservative management were discussed with the patient.  Patient chooses endovenous ablation treatments. Treatment plan to be scheduled. \par \par \par No clinical sign of infection\par Foam applied, then covered with velvet and multilevel compression\par Measurements\par Ankle- 30.5 cm\par Calf- 45 cm\par Length- 37 cm

## 2021-04-13 NOTE — PLAN
[FreeTextEntry1] : Plan:\par Apply lidocaine or topical anesthetic if needed to reduce pain upon washing the wound.\par Wash wound with ----0.9% saline or Dove skin sensitive soap and clean water \par Apply aquacel kurlex ace to right leg \par lt le kurlex ace \par moisture incontinence associated derm to gluteal fold - butterfly pattern\par use of triad ointment removed daily with baby oil wash with soap and water apply thin layer of triad \par Change dressing 2  times per week.\par Leg elevation as tolerated\par Encouraged ambulation or exercise.\par Optimization of nutrition.\par D/C diaper - obtain incontinence pads\par RTO 2 wks\par Has MIKKI Gill- orders given\par Offloading to the wound site.\par \par Wound supplies ordered via DME\par Patient given contact information to DME\par \par Homecare rn in place inst given \par \par Follow up appointment scheduled for 2 week in office\par

## 2021-04-15 ENCOUNTER — APPOINTMENT (OUTPATIENT)
Dept: CARDIOLOGY | Facility: CLINIC | Age: 81
End: 2021-04-15
Payer: MEDICARE

## 2021-04-15 VITALS
SYSTOLIC BLOOD PRESSURE: 102 MMHG | HEIGHT: 66 IN | WEIGHT: 160 LBS | DIASTOLIC BLOOD PRESSURE: 61 MMHG | BODY MASS INDEX: 25.71 KG/M2 | TEMPERATURE: 97.5 F

## 2021-04-15 PROCEDURE — 99214 OFFICE O/P EST MOD 30 MIN: CPT | Mod: CS

## 2021-04-15 RX ORDER — CHLORHEXIDINE GLUCONATE 4 %
325 (65 FE) LIQUID (ML) TOPICAL
Qty: 90 | Refills: 0 | Status: ACTIVE | COMMUNITY
Start: 2021-03-18

## 2021-04-16 NOTE — REVIEW OF SYSTEMS
[Shortness Of Breath] : shortness of breath [Negative] : Heme/Lymph [Recent Weight Loss (___ Lbs)] : recent [unfilled] ~Ulb weight loss [Dyspnea on exertion] : not dyspnea during exertion [Chest Pain] : no chest pain [Lower Ext Edema] : no extremity edema [Palpitations] : no palpitations

## 2021-04-16 NOTE — DISCUSSION/SUMMARY
[___ Month(s)] : [unfilled] month(s) [FreeTextEntry1] : The patient is a 81-year-old Faro[place] gentleman CVA, CKD, BPH, afib, PVD, HFrEF whose edema has resolved\par #1 CV- moderate global LV dysfunction may be secondary to uncontrolled afib, bilateral LE edema\par #2 HFrEF- global LV dysfunction,continue bumex 2mg  but try to decrease to daily and monitor edema\par #3 Htn- continue coreg and losartan\par #4 Afib- continue eliquis 5mg bid\par #5 CKD- repeat labs\par #6 BPH- continue tamsulosin\par #7 PVD- wounds healing, f/u vascular wound doctor. \par #8 CVA- unable to walk

## 2021-04-16 NOTE — HISTORY OF PRESENT ILLNESS
[FreeTextEntry1] : Tim is a 80-year-old gentleman htn, afib, HF, CKD, CVA, Bladder Ca, PVD who is in good spirits. Legs are now thin again. Here with his son.

## 2021-05-28 LAB
ALBUMIN SERPL ELPH-MCNC: 3.9 G/DL
ALP BLD-CCNC: 76 U/L
ALT SERPL-CCNC: 10 U/L
ANION GAP SERPL CALC-SCNC: 20 MMOL/L
AST SERPL-CCNC: 20 U/L
BASOPHILS # BLD AUTO: 0.03 K/UL
BASOPHILS NFR BLD AUTO: 0.5 %
BILIRUB SERPL-MCNC: 0.8 MG/DL
BUN SERPL-MCNC: 111 MG/DL
CALCIUM SERPL-MCNC: 9.5 MG/DL
CHLORIDE SERPL-SCNC: 82 MMOL/L
CO2 SERPL-SCNC: 36 MMOL/L
CREAT SERPL-MCNC: 2.46 MG/DL
EOSINOPHIL # BLD AUTO: 0.17 K/UL
EOSINOPHIL NFR BLD AUTO: 2.9 %
FERRITIN SERPL-MCNC: 140 NG/ML
GLUCOSE SERPL-MCNC: 125 MG/DL
HCT VFR BLD CALC: 35.9 %
HGB BLD-MCNC: 11.1 G/DL
IMM GRANULOCYTES NFR BLD AUTO: 0.5 %
IRON SATN MFR SERPL: 16 %
IRON SERPL-MCNC: 54 UG/DL
LYMPHOCYTES # BLD AUTO: 1.11 K/UL
LYMPHOCYTES NFR BLD AUTO: 19.1 %
MAN DIFF?: NORMAL
MCHC RBC-ENTMCNC: 26.2 PG
MCHC RBC-ENTMCNC: 30.9 GM/DL
MCV RBC AUTO: 84.9 FL
MONOCYTES # BLD AUTO: 0.59 K/UL
MONOCYTES NFR BLD AUTO: 10.2 %
NEUTROPHILS # BLD AUTO: 3.87 K/UL
NEUTROPHILS NFR BLD AUTO: 66.8 %
NT-PROBNP SERPL-MCNC: 3459 PG/ML
PLATELET # BLD AUTO: 133 K/UL
POTASSIUM SERPL-SCNC: 3.9 MMOL/L
PROT SERPL-MCNC: 7.2 G/DL
RBC # BLD: 4.23 M/UL
RBC # FLD: 17.2 %
SODIUM SERPL-SCNC: 138 MMOL/L
TIBC SERPL-MCNC: 333 UG/DL
UIBC SERPL-MCNC: 278 UG/DL
WBC # FLD AUTO: 5.8 K/UL

## 2021-07-01 ENCOUNTER — LABORATORY RESULT (OUTPATIENT)
Age: 81
End: 2021-07-01

## 2021-07-05 ENCOUNTER — NON-APPOINTMENT (OUTPATIENT)
Age: 81
End: 2021-07-05

## 2021-07-07 ENCOUNTER — APPOINTMENT (OUTPATIENT)
Dept: PULMONOLOGY | Facility: CLINIC | Age: 81
End: 2021-07-07
Payer: MEDICARE

## 2021-07-07 ENCOUNTER — APPOINTMENT (OUTPATIENT)
Dept: CARDIOLOGY | Facility: CLINIC | Age: 81
End: 2021-07-07
Payer: MEDICARE

## 2021-07-07 ENCOUNTER — NON-APPOINTMENT (OUTPATIENT)
Age: 81
End: 2021-07-07

## 2021-07-07 VITALS
DIASTOLIC BLOOD PRESSURE: 61 MMHG | RESPIRATION RATE: 14 BRPM | HEART RATE: 65 BPM | OXYGEN SATURATION: 98 % | TEMPERATURE: 97.3 F | SYSTOLIC BLOOD PRESSURE: 100 MMHG

## 2021-07-07 VITALS — WEIGHT: 147 LBS | BODY MASS INDEX: 23.73 KG/M2

## 2021-07-07 DIAGNOSIS — Z86.73 PERSONAL HISTORY OF TRANSIENT ISCHEMIC ATTACK (TIA), AND CEREBRAL INFARCTION W/OUT RESIDUAL DEFICITS: ICD-10-CM

## 2021-07-07 PROCEDURE — 93000 ELECTROCARDIOGRAM COMPLETE: CPT

## 2021-07-07 PROCEDURE — 99214 OFFICE O/P EST MOD 30 MIN: CPT

## 2021-07-07 NOTE — REVIEW OF SYSTEMS
[Fever] : no fever [Fatigue] : fatigue [Chills] : no chills [Poor Appetite] : poor appetite [Cough] : cough [Hemoptysis] : no hemoptysis [Chest Tightness] : no chest tightness [Sputum] : no sputum [Dyspnea] : dyspnea [Pleuritic Pain] : no pleuritic pain [Wheezing] : no wheezing [A.M. Dry Mouth] : a.m. dry mouth [SOB on Exertion] : no sob on exertion [Edema] : no edema [Negative] : Endocrine

## 2021-07-07 NOTE — DISCUSSION/SUMMARY
[FreeTextEntry1] : The patient is a 81-year-old [place]istanian gentleman CVA, CKD, BPH, afib, PVD, HFrEF with acute on chronic kidney disease who appears euvolemic with bilateral LE rash.\par #1 CV- moderate global LV dysfunction may be secondary to uncontrolled afib, bilateral LE edema\par #2 HFrEF- global LV dysfunction,continue bumex 2mg daily and monitor edema\par #3 Htn- continue coreg and losartan\par #4 Afib- continue eliquis 5mg bid\par #5 CKD- repeat labs and urine today, appt with Dr. Gonzalez 7/9/21\par #6 BPH- continue tamsulosin\par #7 PVD- negative arterial and venous doppler, appt with Dr. Guy to evaluate rash\par #8 CVA- unable to walk

## 2021-07-07 NOTE — HISTORY OF PRESENT ILLNESS
[Never] : never [TextBox_4] : Patient is a 81-year-old male with past medical history significant for COVID-19, congestive heart failure, obstructive sleep apnea currently on CPAP, COPD who presents today for follow-up.  The patient has improved with the use of his bronchodilator therapy and his CPAP.  Patient currently denies any recent fevers chills chest pain weight loss or hemoptysis

## 2021-07-07 NOTE — ASSESSMENT
[FreeTextEntry1] : In summary the patient is a 81-year-old male with multiple medical problems including congestive heart failure atrial fibrillation, COPD, obstructive sleep apnea presently on CPAP who presents for follow-up.  The patient's edema has resolved as well as his expiratory wheezing.  Had a lengthy discussion with family at bedside.  Patient is instructed to continue current medications and follow-up in 3 months

## 2021-07-07 NOTE — REASON FOR VISIT
[Follow-Up] : a follow-up visit [Sleep Apnea] : sleep apnea [Cough] : cough [COPD] : COPD [Shortness of Breath] : shortness of breath [Family Member] : family member

## 2021-07-07 NOTE — HISTORY OF PRESENT ILLNESS
[FreeTextEntry1] : Tim has an appt with Dr. Gonzalez on Friday. He cut down to bumex daily. No chest pain, palpitations, dizziness or change in breathing.

## 2021-07-07 NOTE — PHYSICAL EXAM

## 2021-07-08 LAB
ANION GAP SERPL CALC-SCNC: 18 MMOL/L
APPEARANCE: CLEAR
BACTERIA: ABNORMAL
BILIRUBIN URINE: NEGATIVE
BLOOD URINE: NEGATIVE
BUN SERPL-MCNC: 155 MG/DL
CALCIUM SERPL-MCNC: 10.6 MG/DL
CHLORIDE SERPL-SCNC: 78 MMOL/L
CO2 SERPL-SCNC: 38 MMOL/L
COLOR: NORMAL
CREAT SERPL-MCNC: 3.03 MG/DL
GLUCOSE QUALITATIVE U: NEGATIVE
GLUCOSE SERPL-MCNC: 133 MG/DL
HYALINE CASTS: 1 /LPF
KETONES URINE: NEGATIVE
LEUKOCYTE ESTERASE URINE: NEGATIVE
MICROSCOPIC-UA: NORMAL
NITRITE URINE: NEGATIVE
PH URINE: 6.5
POTASSIUM SERPL-SCNC: 2.9 MMOL/L
PROTEIN URINE: NEGATIVE
RED BLOOD CELLS URINE: 0 /HPF
SODIUM SERPL-SCNC: 134 MMOL/L
SPECIFIC GRAVITY URINE: 1.01
SQUAMOUS EPITHELIAL CELLS: 0 /HPF
UROBILINOGEN URINE: NORMAL
WHITE BLOOD CELLS URINE: 0 /HPF

## 2021-07-08 RX ORDER — DOXYCYCLINE HYCLATE 100 MG/1
100 CAPSULE ORAL
Qty: 8 | Refills: 0 | Status: DISCONTINUED | COMMUNITY
Start: 2021-03-05

## 2021-07-09 ENCOUNTER — RX RENEWAL (OUTPATIENT)
Age: 81
End: 2021-07-09

## 2021-07-09 ENCOUNTER — APPOINTMENT (OUTPATIENT)
Dept: NEPHROLOGY | Facility: CLINIC | Age: 81
End: 2021-07-09
Payer: MEDICARE

## 2021-07-09 VITALS
WEIGHT: 145 LBS | TEMPERATURE: 97.3 F | OXYGEN SATURATION: 97 % | DIASTOLIC BLOOD PRESSURE: 54 MMHG | HEART RATE: 67 BPM | SYSTOLIC BLOOD PRESSURE: 93 MMHG | BODY MASS INDEX: 23.4 KG/M2

## 2021-07-09 DIAGNOSIS — R53.83 OTHER FATIGUE: ICD-10-CM

## 2021-07-09 DIAGNOSIS — E87.3 ALKALOSIS: ICD-10-CM

## 2021-07-09 DIAGNOSIS — E87.6 HYPOKALEMIA: ICD-10-CM

## 2021-07-09 PROCEDURE — 99204 OFFICE O/P NEW MOD 45 MIN: CPT

## 2021-07-09 RX ORDER — BACLOFEN 10 MG/1
10 TABLET ORAL 3 TIMES DAILY
Qty: 90 | Refills: 3 | Status: DISCONTINUED | COMMUNITY
Start: 2021-03-18 | End: 2021-07-09

## 2021-07-09 RX ORDER — BACLOFEN 10 MG/1
10 TABLET ORAL
Qty: 90 | Refills: 3 | Status: DISCONTINUED | COMMUNITY
Start: 2017-04-27 | End: 2021-07-09

## 2021-07-09 RX ORDER — POLYETHYLENE GLYCOL 3350 17 G/17G
17 POWDER, FOR SOLUTION ORAL DAILY
Qty: 1 | Refills: 3 | Status: DISCONTINUED | OUTPATIENT
Start: 2021-05-18 | End: 2021-07-09

## 2021-07-10 PROBLEM — E87.3 METABOLIC ALKALOSIS: Status: ACTIVE | Noted: 2021-07-10

## 2021-07-10 PROBLEM — E87.6 HYPOKALEMIA: Status: ACTIVE | Noted: 2021-07-10

## 2021-07-10 PROBLEM — R53.83 LETHARGY: Status: ACTIVE | Noted: 2021-07-10

## 2021-07-10 NOTE — HISTORY OF PRESENT ILLNESS
[FreeTextEntry1] : Prior to the visit I have reviewed the patient's records.  Background of chronic heart failure w/ depressed systolic function (EF ~30%), COPD, admissions to [facility] for decompensated heart failure and respiratory failure which required mechanical ventilation.  The patient is not confused by lethargic. The son provides an accurate history.  The son states that the patient is dependent on wheel chair but is capable of walking very short distances. He had severe edema of the lower extremity but recently after increased doses of Metolazone and Bumetanide he has lost 30 lb and his legs are minimally swollen. The patient is dependent on others for his activiy of daily living. \par He was seen by Dr. Jacobs on July 7th and the dose of diuretics was decreased. THe son has a list of his current medications.  He had labs done on the same days and the results were reviewed with the patient and his son. Of note the patient had severe hypokalemic (K 2.9) metabolic alkalosis (CO2 38).  THe BUNT was 155 and the creatinine 3.03.  \par His currently take Metolazone once a week, Bumetanide 2.5 mg 2 in the morning and one in the evening and a total of 30 mEq of KCl per day.

## 2021-07-10 NOTE — REASON FOR VISIT
[Consultation] : a consultation visit [Family Member] : family member [FreeTextEntry1] : Referred by Dr. Siria Jaocbs for evaluation of stage IV CKD.

## 2021-07-10 NOTE — ASSESSMENT
[FreeTextEntry1] : 1. CKD IV likely related from cardiorenal syndrome and severe COPD.\par 2. Severe hypokalemic metabolic alkalosis.  The metabolic alkalosis may be in part compensatory from COPD. \par 3. Heart Failure w/ depressed EF>\par 4. Lethargy: metabolic encephalopathy exacerbated by Baclofen.\par I had a long discussion with the son about the following:\par Taper and d/c Baclofen as this medication has severe side effects in patients with advanced renal failure. Will start w/ lowering the dose to once a day,\par Need of a more aggressive K supplementation: dose increased to 2 tabs 3 times per day through Sunday, then 2 twice a day after. Home blood draw on Tuesday.  WIll call son on Wednesday.\par Will see the patient in follow up within 2 weeks and discuss renal ultrasound.  The patient is not a candidate for hemodialysis secondary to his hypotension and systolic heart failure. PD is an option if family is willing to participate.\par Will discuss at next visit.

## 2021-07-10 NOTE — REVIEW OF SYSTEMS
[Feeling Tired] : feeling tired [As noted in HPI] : as noted in HPI [As Noted in HPI] : as noted in HPI [Constipation] : constipation [Nocturia] : nocturia [Joint Stiffness] : joint stiffness [Depression] : depression [de-identified] : Discoloration of feet and ankles [de-identified] : History of CVA

## 2021-07-10 NOTE — CONSULT LETTER
[Dear  ___] : Dear  [unfilled], [Consult Letter:] : I had the pleasure of evaluating your patient, [unfilled]. [Please see my note below.] : Please see my note below. [Consult Closing:] : Thank you very much for allowing me to participate in the care of this patient.  If you have any questions, please do not hesitate to contact me. [Sincerely,] : Sincerely, [FreeTextEntry2] : Dr. Siria Jacobs [FreeTextEntry1] : Siria, I lowered the Baclofen to one a day and eventually will tapering him off as this medication has major side effects in patients w/ advanced renal disease.  His last K was 2.9 and his bicarbonate was 38, from diuretics.  I have increased the KCl to 60 mEq a day for 4 days then back down to 4 and ask for a home draw on Tuesday.\par HIs renal disease is most likely related to his very poor cardia and pulmonary status. \par He may benefit form an Aldosterone Receptor Blocker and/or an SGLT2 inhibitors although the latter are not commonly used w/ stage IV CKD.\par I will see him in two weeks and try to fix his potassium. [FreeTextEntry3] : Todd Gonzalez

## 2021-07-13 ENCOUNTER — NON-APPOINTMENT (OUTPATIENT)
Age: 81
End: 2021-07-13

## 2021-07-14 ENCOUNTER — RX RENEWAL (OUTPATIENT)
Age: 81
End: 2021-07-14

## 2021-07-14 ENCOUNTER — NON-APPOINTMENT (OUTPATIENT)
Age: 81
End: 2021-07-14

## 2021-07-14 LAB
ALBUMIN SERPL ELPH-MCNC: 4.3 G/DL
ANION GAP SERPL CALC-SCNC: 18 MMOL/L
BUN SERPL-MCNC: 144 MG/DL
CALCIUM SERPL-MCNC: 10.4 MG/DL
CHLORIDE SERPL-SCNC: 84 MMOL/L
CO2 SERPL-SCNC: 36 MMOL/L
CREAT SERPL-MCNC: 3.44 MG/DL
GLUCOSE SERPL-MCNC: 151 MG/DL
PHOSPHATE SERPL-MCNC: 5.6 MG/DL
POTASSIUM SERPL-SCNC: 2.9 MMOL/L
SODIUM SERPL-SCNC: 138 MMOL/L

## 2021-07-17 DIAGNOSIS — L97.929 NON-PRESSURE CHRONIC ULCER OF UNSPECIFIED PART OF LEFT LOWER LEG WITH UNSPECIFIED SEVERITY: ICD-10-CM

## 2021-07-20 ENCOUNTER — NON-APPOINTMENT (OUTPATIENT)
Age: 81
End: 2021-07-20

## 2021-07-20 LAB
ALBUMIN SERPL ELPH-MCNC: 4.1 G/DL
ANION GAP SERPL CALC-SCNC: 16 MMOL/L
BUN SERPL-MCNC: 113 MG/DL
CALCIUM SERPL-MCNC: 9.6 MG/DL
CHLORIDE SERPL-SCNC: 87 MMOL/L
CO2 SERPL-SCNC: 32 MMOL/L
CREAT SERPL-MCNC: 2.74 MG/DL
GLUCOSE SERPL-MCNC: 152 MG/DL
PHOSPHATE SERPL-MCNC: 4 MG/DL
POTASSIUM SERPL-SCNC: 3.4 MMOL/L
SODIUM SERPL-SCNC: 135 MMOL/L

## 2021-07-27 LAB
ALBUMIN SERPL ELPH-MCNC: 4.1 G/DL
ANION GAP SERPL CALC-SCNC: 15 MMOL/L
BUN SERPL-MCNC: 107 MG/DL
CALCIUM SERPL-MCNC: 9.8 MG/DL
CHLORIDE SERPL-SCNC: 87 MMOL/L
CO2 SERPL-SCNC: 33 MMOL/L
CREAT SERPL-MCNC: 2.83 MG/DL
GLUCOSE SERPL-MCNC: 109 MG/DL
PHOSPHATE SERPL-MCNC: 3.9 MG/DL
POTASSIUM SERPL-SCNC: 3.8 MMOL/L
SODIUM SERPL-SCNC: 136 MMOL/L

## 2021-08-18 ENCOUNTER — NON-APPOINTMENT (OUTPATIENT)
Age: 81
End: 2021-08-18

## 2021-08-18 ENCOUNTER — APPOINTMENT (OUTPATIENT)
Dept: CARDIOLOGY | Facility: CLINIC | Age: 81
End: 2021-08-18
Payer: MEDICARE

## 2021-08-18 VITALS
OXYGEN SATURATION: 96 % | HEART RATE: 75 BPM | WEIGHT: 146 LBS | RESPIRATION RATE: 14 BRPM | TEMPERATURE: 96.3 F | BODY MASS INDEX: 23.57 KG/M2 | DIASTOLIC BLOOD PRESSURE: 56 MMHG | SYSTOLIC BLOOD PRESSURE: 90 MMHG

## 2021-08-18 PROCEDURE — 93000 ELECTROCARDIOGRAM COMPLETE: CPT

## 2021-08-18 PROCEDURE — 99214 OFFICE O/P EST MOD 30 MIN: CPT

## 2021-08-18 RX ORDER — BACLOFEN 10 MG/1
10 TABLET ORAL
Refills: 0 | Status: DISCONTINUED | COMMUNITY
Start: 2021-08-18 | End: 2021-08-18

## 2021-08-18 NOTE — DISCUSSION/SUMMARY
[___ Month(s)] : in [unfilled] month(s) [FreeTextEntry1] : The patient is a 81-year-old [place] gentleman CVA, CKD, BPH, afib, PVD, HFrEF with venous stasis otherwise improving.\par #1 CV- moderate global LV dysfunction may be secondary to uncontrolled afib, bilateral LE edema resolved, echo  next visit\par #2 HFrEF- global LV dysfunction,continue bumex 2mg daily and monitor edema\par #3 Htn- continue coreg and losartan\par #4 Afib- continue eliquis 5mg bid\par #5 CKD- repeat labs today, f/u Dr. Gonzalez\par #6 BPH- continue tamsulosin\par #7 PVD- negative arterial and venous doppler\par #8 CVA- unable to walk

## 2021-08-18 NOTE — PHYSICAL EXAM
[Well Developed] : well developed [Well Nourished] : well nourished [No Acute Distress] : no acute distress [Normal Conjunctiva] : normal conjunctiva [Normal Venous Pressure] : normal venous pressure [No Carotid Bruit] : no carotid bruit [Normal S1, S2] : normal S1, S2 [No Murmur] : no murmur [No Rub] : no rub [No Gallop] : no gallop [Clear Lung Fields] : clear lung fields [Good Air Entry] : good air entry [No Respiratory Distress] : no respiratory distress  [Soft] : abdomen soft [Non Tender] : non-tender [No Masses/organomegaly] : no masses/organomegaly [Normal Bowel Sounds] : normal bowel sounds [Normal Gait] : normal gait [No Edema] : no edema [No Cyanosis] : no cyanosis [No Clubbing] : no clubbing [No Varicosities] : no varicosities [No Rash] : no rash [No Skin Lesions] : no skin lesions [Moves all extremities] : moves all extremities [No Focal Deficits] : no focal deficits [Normal Speech] : normal speech [Alert and Oriented] : alert and oriented [Normal memory] : normal memory [Venous stasis] : venous stasis [de-identified] : irreg irreg

## 2021-08-18 NOTE — REVIEW OF SYSTEMS
[Negative] : Heme/Lymph [SOB] : no shortness of breath [Dyspnea on exertion] : not dyspnea during exertion [Chest Discomfort] : no chest discomfort [Lower Ext Edema] : no extremity edema [Leg Claudication] : no intermittent leg claudication [Palpitations] : no palpitations [Syncope] : no syncope

## 2021-08-19 LAB
ALBUMIN SERPL ELPH-MCNC: 4.5 G/DL
ANION GAP SERPL CALC-SCNC: 18 MMOL/L
BUN SERPL-MCNC: 120 MG/DL
CALCIUM SERPL-MCNC: 9.7 MG/DL
CHLORIDE SERPL-SCNC: 88 MMOL/L
CO2 SERPL-SCNC: 31 MMOL/L
CREAT SERPL-MCNC: 3.47 MG/DL
GLUCOSE SERPL-MCNC: 153 MG/DL
PHOSPHATE SERPL-MCNC: 5.8 MG/DL
POTASSIUM SERPL-SCNC: 4.1 MMOL/L
SODIUM SERPL-SCNC: 137 MMOL/L

## 2021-08-25 LAB
ANION GAP SERPL CALC-SCNC: 18 MMOL/L
BUN SERPL-MCNC: 122 MG/DL
CALCIUM SERPL-MCNC: 9.9 MG/DL
CHLORIDE SERPL-SCNC: 85 MMOL/L
CO2 SERPL-SCNC: 31 MMOL/L
CREAT SERPL-MCNC: 3.03 MG/DL
GLUCOSE SERPL-MCNC: 161 MG/DL
POTASSIUM SERPL-SCNC: 3.6 MMOL/L
SODIUM SERPL-SCNC: 135 MMOL/L

## 2021-09-27 ENCOUNTER — LABORATORY RESULT (OUTPATIENT)
Age: 81
End: 2021-09-27

## 2021-10-06 ENCOUNTER — APPOINTMENT (OUTPATIENT)
Dept: CARDIOLOGY | Facility: CLINIC | Age: 81
End: 2021-10-06
Payer: MEDICARE

## 2021-10-06 ENCOUNTER — NON-APPOINTMENT (OUTPATIENT)
Age: 81
End: 2021-10-06

## 2021-10-06 ENCOUNTER — APPOINTMENT (OUTPATIENT)
Dept: PULMONOLOGY | Facility: CLINIC | Age: 81
End: 2021-10-06
Payer: MEDICARE

## 2021-10-06 VITALS
BODY MASS INDEX: 24.43 KG/M2 | HEART RATE: 68 BPM | OXYGEN SATURATION: 99 % | SYSTOLIC BLOOD PRESSURE: 105 MMHG | HEIGHT: 66 IN | RESPIRATION RATE: 12 BRPM | WEIGHT: 152 LBS | DIASTOLIC BLOOD PRESSURE: 59 MMHG | TEMPERATURE: 97.1 F

## 2021-10-06 PROCEDURE — 99214 OFFICE O/P EST MOD 30 MIN: CPT | Mod: CS

## 2021-10-06 PROCEDURE — 93000 ELECTROCARDIOGRAM COMPLETE: CPT

## 2021-10-06 PROCEDURE — 99214 OFFICE O/P EST MOD 30 MIN: CPT

## 2021-10-06 PROCEDURE — 93306 TTE W/DOPPLER COMPLETE: CPT

## 2021-10-06 RX ORDER — SPIRONOLACTONE 25 MG/1
25 TABLET ORAL
Qty: 90 | Refills: 0 | Status: DISCONTINUED | COMMUNITY
Start: 2021-07-14 | End: 2021-10-06

## 2021-10-06 NOTE — DISCUSSION/SUMMARY
[___ Month(s)] : in [unfilled] month(s) [FreeTextEntry1] : The patient is a 81-year-old [place] gentleman CVA, CKD, BPH, afib, PVD, HFrEF,venous stasis who continues to improve.\par #1 CV- moderate global LV dysfunction markedly improved on preliminary review of today echo, bilateral LE edema resolved\par #2 HFrEF- global LV dysfunction,continue bumex 2mg daily but for three days take it twice a day and double K for those three days\par #3 Htn- continue coreg and losartan\par #4 Afib- continue eliquis 5mg bid\par #5 CKD- repeat labs improvement in renal function off spironolactone and K is now stable, f/u Dr. Gonzalez\par #6 BPH- continue tamsulosin\par #7 PVD- negative arterial and venous doppler\par #8 CVA- unable to walk wihtout assistance

## 2021-10-06 NOTE — HISTORY OF PRESENT ILLNESS
[Never] : never [TextBox_4] : Patient is a 81-year-old male with past medical history significant for diabetes, hypertension, congestive heart failure, obstructive sleep apnea currently on CPAP who presents today for follow-up.  Patient states that he uses his CPAP a minimum of 3 to 4 hours/day.  He states that he is compliant with his diet and his medications.  He denies fevers chills chest pain weight loss or hemoptysis

## 2021-10-06 NOTE — HISTORY OF PRESENT ILLNESS
[FreeTextEntry1] : Adriais is in good spirits and feels much more energetic. No CP, palpitations, lightheadedness, dizziness or SOB.

## 2021-10-06 NOTE — PHYSICAL EXAM
[Well Developed] : well developed [Well Nourished] : well nourished [No Acute Distress] : no acute distress [Normal Conjunctiva] : normal conjunctiva [Normal Venous Pressure] : normal venous pressure [No Carotid Bruit] : no carotid bruit [Normal S1, S2] : normal S1, S2 [No Murmur] : no murmur [No Rub] : no rub [No Gallop] : no gallop [Clear Lung Fields] : clear lung fields [Good Air Entry] : good air entry [No Respiratory Distress] : no respiratory distress  [Soft] : abdomen soft [Non Tender] : non-tender [No Masses/organomegaly] : no masses/organomegaly [Normal Bowel Sounds] : normal bowel sounds [Normal Gait] : normal gait [No Edema] : no edema [No Cyanosis] : no cyanosis [No Clubbing] : no clubbing [No Varicosities] : no varicosities [Venous stasis] : venous stasis [No Rash] : no rash [No Skin Lesions] : no skin lesions [Moves all extremities] : moves all extremities [No Focal Deficits] : no focal deficits [Normal Speech] : normal speech [Alert and Oriented] : alert and oriented [Normal memory] : normal memory [de-identified] : irreg irreg

## 2021-10-28 ENCOUNTER — APPOINTMENT (OUTPATIENT)
Dept: INTERNAL MEDICINE | Facility: CLINIC | Age: 81
End: 2021-10-28
Payer: MEDICARE

## 2021-10-28 VITALS
HEART RATE: 66 BPM | BODY MASS INDEX: 24.43 KG/M2 | DIASTOLIC BLOOD PRESSURE: 63 MMHG | WEIGHT: 152 LBS | HEIGHT: 66 IN | SYSTOLIC BLOOD PRESSURE: 104 MMHG | TEMPERATURE: 97.3 F | OXYGEN SATURATION: 98 %

## 2021-10-28 DIAGNOSIS — G47.00 INSOMNIA, UNSPECIFIED: ICD-10-CM

## 2021-10-28 DIAGNOSIS — K59.00 CONSTIPATION, UNSPECIFIED: ICD-10-CM

## 2021-10-28 PROCEDURE — G0439: CPT

## 2021-10-28 PROCEDURE — 36415 COLL VENOUS BLD VENIPUNCTURE: CPT

## 2021-10-28 PROCEDURE — 99214 OFFICE O/P EST MOD 30 MIN: CPT | Mod: 25

## 2021-10-28 RX ORDER — OSTOMY SUPPLY 2 3/4"
EACH MISCELLANEOUS
Qty: 1 | Refills: 1 | Status: DISCONTINUED | COMMUNITY
Start: 2020-05-07 | End: 2021-10-28

## 2021-10-28 RX ORDER — SILVER SULFADIAZINE 10 MG/G
1 CREAM TOPICAL TWICE DAILY
Qty: 1 | Refills: 0 | Status: DISCONTINUED | COMMUNITY
Start: 2020-05-07 | End: 2021-10-28

## 2021-10-28 NOTE — HISTORY OF PRESENT ILLNESS
[FreeTextEntry1] : Patient presents for subsequent Medicare annual wellness visit and chronic disease management [de-identified] : Feels well overall does walk with walker states that he is able to walk further intolerance to is increasing\par Denies any lower extremity edema abdominal pain chest tightness chest pain\par Has some difficulty sleeping, has difficulty falling asleep at night will sleep at 1 AM and sometimes wakes up at 7 AM and 10 AM no sleep during the day as well.\par Denies any falls at home

## 2021-10-28 NOTE — HEALTH RISK ASSESSMENT
[Good] : ~his/her~  mood as  good [FreeTextEntry1] : health maintenance [No] : No [0] : 2) Feeling down, depressed, or hopeless: Not at all (0) [de-identified] : none [[id number]] : 0 [Change in mental status noted] : No change in mental status noted [Language] : denies difficulty with language [Behavior] : denies difficulty with behavior [Learning/Retaining New Information] : denies difficulty learning/retaining new information [Handling Complex Tasks] : denies difficulty handling complex tasks [Reasoning] : denies difficulty with reasoning [Spatial Ability and Orientation] : denies difficulty with spatial ability and orientation [Some assistance needed] : managing finances [Reports changes in hearing] : Reports no changes in hearing [Reports changes in vision] : Reports no changes in vision [Reports normal functional visual acuity (ie: able to read med bottle)] : Reports normal functional visual acuity [Reports changes in dental health] : Reports no changes in dental health [Smoke Detector] : smoke detector [Carbon Monoxide Detector] : carbon monoxide detector

## 2021-10-28 NOTE — ASSESSMENT
[FreeTextEntry1] : Patient appears clinically euvolemic\par Advised high-fiber diet Metamucil for constipation\par Check electrolytes\par Keep legs elevated for chronic venous insufficiency\par Blood pressure stable\par Physical therapy ordered for deconditioning\par Melatonin ordered for insomnia

## 2021-11-02 LAB
25(OH)D3 SERPL-MCNC: 58.4 NG/ML
ALBUMIN SERPL ELPH-MCNC: 4.5 G/DL
ALP BLD-CCNC: 72 U/L
ALT SERPL-CCNC: 15 U/L
ANION GAP SERPL CALC-SCNC: 18 MMOL/L
APPEARANCE: CLEAR
APPEARANCE: CLEAR
AST SERPL-CCNC: 16 U/L
BACTERIA: NEGATIVE
BASOPHILS # BLD AUTO: 0.07 K/UL
BASOPHILS NFR BLD AUTO: 0.8 %
BILIRUB SERPL-MCNC: 0.6 MG/DL
BILIRUBIN URINE: NEGATIVE
BILIRUBIN URINE: NEGATIVE
BLOOD URINE: NEGATIVE
BLOOD URINE: NEGATIVE
BUN SERPL-MCNC: 85 MG/DL
CALCIUM SERPL-MCNC: 10 MG/DL
CALCIUM SERPL-MCNC: 10 MG/DL
CHLORIDE SERPL-SCNC: 90 MMOL/L
CHOLEST SERPL-MCNC: 110 MG/DL
CO2 SERPL-SCNC: 31 MMOL/L
COLOR: NORMAL
COLOR: NORMAL
CREAT SERPL-MCNC: 2.3 MG/DL
EOSINOPHIL # BLD AUTO: 0.41 K/UL
EOSINOPHIL NFR BLD AUTO: 4.9 %
ESTIMATED AVERAGE GLUCOSE: 137 MG/DL
GLUCOSE QUALITATIVE U: NEGATIVE
GLUCOSE QUALITATIVE U: NEGATIVE
GLUCOSE SERPL-MCNC: 95 MG/DL
HBA1C MFR BLD HPLC: 6.4 %
HCT VFR BLD CALC: 38.5 %
HDLC SERPL-MCNC: 33 MG/DL
HGB BLD-MCNC: 12.1 G/DL
HYALINE CASTS: 1 /LPF
IMM GRANULOCYTES NFR BLD AUTO: 0.2 %
KETONES URINE: NEGATIVE
KETONES URINE: NEGATIVE
LDLC SERPL CALC-MCNC: 37 MG/DL
LEUKOCYTE ESTERASE URINE: NEGATIVE
LEUKOCYTE ESTERASE URINE: NEGATIVE
LYMPHOCYTES # BLD AUTO: 1.55 K/UL
LYMPHOCYTES NFR BLD AUTO: 18.4 %
MAGNESIUM SERPL-MCNC: 2.4 MG/DL
MAN DIFF?: NORMAL
MCHC RBC-ENTMCNC: 30.7 PG
MCHC RBC-ENTMCNC: 31.4 GM/DL
MCV RBC AUTO: 97.7 FL
MICROSCOPIC-UA: NORMAL
MONOCYTES # BLD AUTO: 0.89 K/UL
MONOCYTES NFR BLD AUTO: 10.5 %
NEUTROPHILS # BLD AUTO: 5.5 K/UL
NEUTROPHILS NFR BLD AUTO: 65.2 %
NITRITE URINE: NEGATIVE
NITRITE URINE: NEGATIVE
NONHDLC SERPL-MCNC: 77 MG/DL
NT-PROBNP SERPL-MCNC: 2772 PG/ML
PARATHYROID HORMONE INTACT: 141 PG/ML
PH URINE: 6.5
PH URINE: 6.5
PHOSPHATE SERPL-MCNC: 3.9 MG/DL
PLATELET # BLD AUTO: 164 K/UL
POTASSIUM SERPL-SCNC: 3.5 MMOL/L
PROT SERPL-MCNC: 7.4 G/DL
PROTEIN URINE: NEGATIVE
PROTEIN URINE: NEGATIVE
RBC # BLD: 3.94 M/UL
RBC # FLD: 14.7 %
RED BLOOD CELLS URINE: 0 /HPF
SODIUM SERPL-SCNC: 139 MMOL/L
SPECIFIC GRAVITY URINE: 1.01
SPECIFIC GRAVITY URINE: 1.01
SQUAMOUS EPITHELIAL CELLS: 0 /HPF
TRIGL SERPL-MCNC: 204 MG/DL
TSH SERPL-ACNC: 2.91 UIU/ML
UROBILINOGEN URINE: NORMAL
UROBILINOGEN URINE: NORMAL
VIT B12 SERPL-MCNC: 557 PG/ML
WBC # FLD AUTO: 8.44 K/UL
WHITE BLOOD CELLS URINE: 0 /HPF

## 2021-12-08 ENCOUNTER — NON-APPOINTMENT (OUTPATIENT)
Age: 81
End: 2021-12-08

## 2021-12-08 ENCOUNTER — APPOINTMENT (OUTPATIENT)
Dept: CARDIOLOGY | Facility: CLINIC | Age: 81
End: 2021-12-08
Payer: MEDICARE

## 2021-12-08 VITALS
HEART RATE: 87 BPM | TEMPERATURE: 97.3 F | SYSTOLIC BLOOD PRESSURE: 101 MMHG | WEIGHT: 152 LBS | HEIGHT: 66 IN | RESPIRATION RATE: 16 BRPM | DIASTOLIC BLOOD PRESSURE: 61 MMHG | BODY MASS INDEX: 24.43 KG/M2 | OXYGEN SATURATION: 98 %

## 2021-12-08 PROCEDURE — 99214 OFFICE O/P EST MOD 30 MIN: CPT

## 2021-12-08 PROCEDURE — 93000 ELECTROCARDIOGRAM COMPLETE: CPT

## 2021-12-08 NOTE — DISCUSSION/SUMMARY
[FreeTextEntry1] : The patient is a 81-year-old [place] gentleman CVA, CKD, BPH, afib, PVD, HFrEF,venous stasis who continues to improve.\par #1 CV- moderate global LV dysfunction markedly improved on preliminary review of today echo, bilateral LE edema resolved\par #2 HFrEF- global LV dysfunction,continue bumex 2mg daily, recent labs stable with improved BNP\par #3 Htn- continue coreg and losartan\par #4 Afib- continue eliquis 5mg bid\par #5 CKD- repeat labs improvement in renal function off spironolactone and K is now stable, f/u Dr. Gonzalez\par #6 BPH- continue tamsulosin\par #7 PVD- negative arterial and venous doppler\par #8 CVA- unable to walk without assistance, refer to ortho before considering surgery

## 2021-12-08 NOTE — HISTORY OF PRESENT ILLNESS
[FreeTextEntry1] : Tim is feeling well with improved breathing and able to walk further. Biggest problem is leg pain from his back. Son asking about possible surgery, No PND< orthopnea, palpitaitons or dizizness.

## 2021-12-12 ENCOUNTER — NON-APPOINTMENT (OUTPATIENT)
Age: 81
End: 2021-12-12

## 2021-12-16 ENCOUNTER — APPOINTMENT (OUTPATIENT)
Dept: ORTHOPEDIC SURGERY | Facility: CLINIC | Age: 81
End: 2021-12-16
Payer: MEDICARE

## 2021-12-16 VITALS — BODY MASS INDEX: 24.43 KG/M2 | HEIGHT: 66 IN | WEIGHT: 152 LBS

## 2021-12-16 DIAGNOSIS — M25.552 PAIN IN LEFT HIP: ICD-10-CM

## 2021-12-16 DIAGNOSIS — G89.29 PAIN IN LEFT KNEE: ICD-10-CM

## 2021-12-16 DIAGNOSIS — G89.29 PAIN IN LEFT HIP: ICD-10-CM

## 2021-12-16 DIAGNOSIS — M16.10 UNILATERAL PRIMARY OSTEOARTHRITIS, UNSPECIFIED HIP: ICD-10-CM

## 2021-12-16 DIAGNOSIS — M25.562 PAIN IN LEFT KNEE: ICD-10-CM

## 2021-12-16 DIAGNOSIS — M54.16 RADICULOPATHY, LUMBAR REGION: ICD-10-CM

## 2021-12-16 PROCEDURE — 73502 X-RAY EXAM HIP UNI 2-3 VIEWS: CPT | Mod: LT

## 2021-12-16 PROCEDURE — 99215 OFFICE O/P EST HI 40 MIN: CPT

## 2021-12-17 PROBLEM — M16.10 HIP ARTHRITIS: Status: ACTIVE | Noted: 2021-12-17

## 2021-12-20 PROBLEM — M25.552 CHRONIC LEFT HIP PAIN: Status: ACTIVE | Noted: 2021-12-16

## 2021-12-20 PROBLEM — M25.562 CHRONIC PAIN OF LEFT KNEE: Status: ACTIVE | Noted: 2020-07-07

## 2021-12-23 ENCOUNTER — RX RENEWAL (OUTPATIENT)
Age: 81
End: 2021-12-23

## 2022-01-24 ENCOUNTER — RX RENEWAL (OUTPATIENT)
Age: 82
End: 2022-01-24

## 2022-02-18 ENCOUNTER — APPOINTMENT (OUTPATIENT)
Dept: CT IMAGING | Facility: CLINIC | Age: 82
End: 2022-02-18
Payer: MEDICARE

## 2022-02-18 ENCOUNTER — OUTPATIENT (OUTPATIENT)
Dept: OUTPATIENT SERVICES | Facility: HOSPITAL | Age: 82
LOS: 1 days | End: 2022-02-18
Payer: MEDICARE

## 2022-02-18 DIAGNOSIS — M54.16 RADICULOPATHY, LUMBAR REGION: ICD-10-CM

## 2022-02-18 DIAGNOSIS — C67.9 MALIGNANT NEOPLASM OF BLADDER, UNSPECIFIED: Chronic | ICD-10-CM

## 2022-02-18 DIAGNOSIS — M25.552 PAIN IN LEFT HIP: ICD-10-CM

## 2022-02-18 PROCEDURE — G1004: CPT

## 2022-02-18 PROCEDURE — 73700 CT LOWER EXTREMITY W/O DYE: CPT | Mod: 26,LT,ME

## 2022-02-18 PROCEDURE — 73700 CT LOWER EXTREMITY W/O DYE: CPT | Mod: ME

## 2022-02-26 ENCOUNTER — TRANSCRIPTION ENCOUNTER (OUTPATIENT)
Age: 82
End: 2022-02-26

## 2022-03-03 ENCOUNTER — OUTPATIENT (OUTPATIENT)
Dept: OUTPATIENT SERVICES | Facility: HOSPITAL | Age: 82
LOS: 1 days | End: 2022-03-03
Payer: MEDICARE

## 2022-03-03 VITALS
HEIGHT: 61.5 IN | WEIGHT: 145.95 LBS | HEART RATE: 76 BPM | SYSTOLIC BLOOD PRESSURE: 100 MMHG | RESPIRATION RATE: 18 BRPM | TEMPERATURE: 98 F | OXYGEN SATURATION: 97 % | DIASTOLIC BLOOD PRESSURE: 64 MMHG

## 2022-03-03 DIAGNOSIS — G47.33 OBSTRUCTIVE SLEEP APNEA (ADULT) (PEDIATRIC): ICD-10-CM

## 2022-03-03 DIAGNOSIS — C67.9 MALIGNANT NEOPLASM OF BLADDER, UNSPECIFIED: Chronic | ICD-10-CM

## 2022-03-03 DIAGNOSIS — M16.12 UNILATERAL PRIMARY OSTEOARTHRITIS, LEFT HIP: ICD-10-CM

## 2022-03-03 DIAGNOSIS — N18.9 CHRONIC KIDNEY DISEASE, UNSPECIFIED: ICD-10-CM

## 2022-03-03 DIAGNOSIS — Z29.9 ENCOUNTER FOR PROPHYLACTIC MEASURES, UNSPECIFIED: ICD-10-CM

## 2022-03-03 DIAGNOSIS — Z01.818 ENCOUNTER FOR OTHER PREPROCEDURAL EXAMINATION: ICD-10-CM

## 2022-03-03 DIAGNOSIS — I48.91 UNSPECIFIED ATRIAL FIBRILLATION: ICD-10-CM

## 2022-03-03 LAB
ANION GAP SERPL CALC-SCNC: 17 MMOL/L — SIGNIFICANT CHANGE UP (ref 5–17)
BLD GP AB SCN SERPL QL: NEGATIVE — SIGNIFICANT CHANGE UP
BUN SERPL-MCNC: 86 MG/DL — HIGH (ref 7–23)
CALCIUM SERPL-MCNC: 10.1 MG/DL — SIGNIFICANT CHANGE UP (ref 8.4–10.5)
CHLORIDE SERPL-SCNC: 88 MMOL/L — LOW (ref 96–108)
CO2 SERPL-SCNC: 31 MMOL/L — SIGNIFICANT CHANGE UP (ref 22–31)
CREAT SERPL-MCNC: 2.57 MG/DL — HIGH (ref 0.5–1.3)
EGFR: 25 ML/MIN/1.73M2 — LOW
GLUCOSE SERPL-MCNC: 100 MG/DL — HIGH (ref 70–99)
HCT VFR BLD CALC: 37.1 % — LOW (ref 39–50)
HGB BLD-MCNC: 11.9 G/DL — LOW (ref 13–17)
MCHC RBC-ENTMCNC: 30.1 PG — SIGNIFICANT CHANGE UP (ref 27–34)
MCHC RBC-ENTMCNC: 32.1 GM/DL — SIGNIFICANT CHANGE UP (ref 32–36)
MCV RBC AUTO: 93.9 FL — SIGNIFICANT CHANGE UP (ref 80–100)
NRBC # BLD: 0 /100 WBCS — SIGNIFICANT CHANGE UP (ref 0–0)
PLATELET # BLD AUTO: 145 K/UL — LOW (ref 150–400)
POTASSIUM SERPL-MCNC: 3.1 MMOL/L — LOW (ref 3.5–5.3)
POTASSIUM SERPL-SCNC: 3.1 MMOL/L — LOW (ref 3.5–5.3)
RBC # BLD: 3.95 M/UL — LOW (ref 4.2–5.8)
RBC # FLD: 15.1 % — HIGH (ref 10.3–14.5)
RH IG SCN BLD-IMP: POSITIVE — SIGNIFICANT CHANGE UP
SODIUM SERPL-SCNC: 136 MMOL/L — SIGNIFICANT CHANGE UP (ref 135–145)
WBC # BLD: 7.04 K/UL — SIGNIFICANT CHANGE UP (ref 3.8–10.5)
WBC # FLD AUTO: 7.04 K/UL — SIGNIFICANT CHANGE UP (ref 3.8–10.5)

## 2022-03-03 PROCEDURE — 83036 HEMOGLOBIN GLYCOSYLATED A1C: CPT

## 2022-03-03 PROCEDURE — 86900 BLOOD TYPING SEROLOGIC ABO: CPT

## 2022-03-03 PROCEDURE — 87641 MR-STAPH DNA AMP PROBE: CPT

## 2022-03-03 PROCEDURE — 80048 BASIC METABOLIC PNL TOTAL CA: CPT

## 2022-03-03 PROCEDURE — 36415 COLL VENOUS BLD VENIPUNCTURE: CPT

## 2022-03-03 PROCEDURE — 86850 RBC ANTIBODY SCREEN: CPT

## 2022-03-03 PROCEDURE — G0463: CPT

## 2022-03-03 PROCEDURE — 85027 COMPLETE CBC AUTOMATED: CPT

## 2022-03-03 PROCEDURE — 87640 STAPH A DNA AMP PROBE: CPT

## 2022-03-03 PROCEDURE — 86901 BLOOD TYPING SEROLOGIC RH(D): CPT

## 2022-03-03 RX ORDER — ASPIRIN/CALCIUM CARB/MAGNESIUM 324 MG
1 TABLET ORAL
Qty: 0 | Refills: 0 | DISCHARGE

## 2022-03-03 RX ORDER — CHOLECALCIFEROL (VITAMIN D3) 125 MCG
1 CAPSULE ORAL
Qty: 0 | Refills: 0 | DISCHARGE

## 2022-03-03 RX ORDER — CEFAZOLIN SODIUM 1 G
2000 VIAL (EA) INJECTION ONCE
Refills: 0 | Status: DISCONTINUED | OUTPATIENT
Start: 2022-03-22 | End: 2022-03-25

## 2022-03-03 RX ORDER — LOSARTAN POTASSIUM 100 MG/1
1 TABLET, FILM COATED ORAL
Qty: 0 | Refills: 0 | DISCHARGE

## 2022-03-03 RX ORDER — FINASTERIDE 5 MG/1
1 TABLET, FILM COATED ORAL
Qty: 0 | Refills: 0 | DISCHARGE

## 2022-03-03 RX ORDER — POTASSIUM CHLORIDE 20 MEQ
1 PACKET (EA) ORAL
Qty: 0 | Refills: 0 | DISCHARGE

## 2022-03-03 RX ORDER — FOLIC ACID 0.8 MG
1 TABLET ORAL
Qty: 0 | Refills: 0 | DISCHARGE

## 2022-03-03 RX ORDER — BACLOFEN 100 %
1 POWDER (GRAM) MISCELLANEOUS
Qty: 0 | Refills: 0 | DISCHARGE

## 2022-03-03 RX ORDER — FERROUS SULFATE 325(65) MG
1 TABLET ORAL
Qty: 0 | Refills: 0 | DISCHARGE

## 2022-03-03 NOTE — H&P PST ADULT - HISTORY OF PRESENT ILLNESS
This is a  80 y/o male c/o left hip pain associated with difficulty walking, he presents today for left total hip arthroplasty 3/22/22  covid swab to be done 3/19 at [facility]  denies recent travel, or covid infections

## 2022-03-03 NOTE — H&P PST ADULT - FALL HARM RISK - HARM RISK INTERVENTIONS

## 2022-03-03 NOTE — H&P PST ADULT - ASSESSMENT
MANFREDI VTE 2.0 SCORE [CLOT updated 2019]    AGE RELATED RISK FACTORS                                                       MOBILITY RELATED FACTORS  [ ] Age 41-60 years                                            (1 Point)                    [ ] Bed rest                                                        (1 Point)  [ ] Age: 61-74 years                                           (2 Points)                  [ ] Plaster cast                                                   (2 Points)  [x ] Age= 75 years                                              (3 Points)                    [ ] Bed bound for more than 72 hours                 (2 Points)    DISEASE RELATED RISK FACTORS                                               GENDER SPECIFIC FACTORS  [ ] Edema in the lower extremities                       (1 Point)              [ ] Pregnancy                                                     (1 Point)  [ ] Varicose veins                                               (1 Point)                     [ ] Post-partum < 6 weeks                                   (1 Point)             [x ] BMI > 25 Kg/m2                                            (1 Point)                     [ ] Hormonal therapy  or oral contraception          (1 Point)                 [ ] Sepsis (in the previous month)                        (1 Point)               [ ] History of pregnancy complications                 (1 point)  [ ] Pneumonia or serious lung disease                                               [ ] Unexplained or recurrent                     (1 Point)           (in the previous month)                               (1 Point)  [ ] Abnormal pulmonary function test                     (1 Point)                 SURGERY RELATED RISK FACTORS  [ ] Acute myocardial infarction                              (1 Point)               [ ]  Section                                             (1 Point)  [ ] Congestive heart failure (in the previous month)  (1 Point)      [ ] Minor surgery                                                  (1 Point)   [ ] Inflammatory bowel disease                             (1 Point)               [ ] Arthroscopic surgery                                        (2 Points)  [ ] Central venous access                                      (2 Points)                [ ] General surgery lasting more than 45 minutes (2 points)  [x] Malignancy- Present or previous                   (2 Points)                [x ] Elective arthroplasty                                         (5 points)    [ ] Stroke (in the previous month)                          (5 Points)                                                                                                                                                           HEMATOLOGY RELATED FACTORS                                                 TRAUMA RELATED RISK FACTORS  [ ] Prior episodes of VTE                                     (3 Points)                [ ] Fracture of the hip, pelvis, or leg                       (5 Points)  [ ] Positive family history for VTE                         (3 Points)             [ ] Acute spinal cord injury (in the previous month)  (5 Points)  [ ] Prothrombin 51467 A                                     (3 Points)               [ ] Paralysis  (less than 1 month)                             (5 Points)  [ ] Factor V Leiden                                             (3 Points)                  [ ] Multiple Trauma within 1 month                        (5 Points)  [ ] Lupus anticoagulants                                     (3 Points)                                                           [ ] Anticardiolipin antibodies                               (3 Points)                                                       [ ] High homocysteine in the blood                      (3 Points)                                             [ ] Other congenital or acquired thrombophilia      (3 Points)                                                [ ] Heparin induced thrombocytopenia                  (3 Points)                                     Total Score [    11      ]

## 2022-03-03 NOTE — H&P PST ADULT - NSICDXPASTMEDICALHX_GEN_ALL_CORE_FT
PAST MEDICAL HISTORY:  Atrial fibrillation     Bladder cancer 2019    BPH (Benign Prostatic Hypertrophy)     Cerebrovascular accident (CVA) 2010    Diabetes mellitus     Gouty arthritis     Hyperlipidemia     Hypertension      PAST MEDICAL HISTORY:  2019 novel coronavirus disease (COVID-19) 2/2021, quarantine, no intubation    Atrial fibrillation     Bladder cancer 2019    BPH (Benign Prostatic Hypertrophy)     Cerebrovascular accident (CVA) 2010    Chronic heart failure     Chronic kidney disease, unspecified CKD stage     Chronic obstructive pulmonary disease, unspecified COPD type use BIPAP machine  day time 4 hours    Diabetes mellitus     Gouty arthritis     Hyperlipidemia     Hypertension     Obstructive sleep apnea

## 2022-03-04 LAB
A1C WITH ESTIMATED AVERAGE GLUCOSE RESULT: 6.8 % — HIGH (ref 4–5.6)
ESTIMATED AVERAGE GLUCOSE: 148 MG/DL — HIGH (ref 68–114)
MRSA PCR RESULT.: SIGNIFICANT CHANGE UP
S AUREUS DNA NOSE QL NAA+PROBE: SIGNIFICANT CHANGE UP

## 2022-03-08 PROBLEM — J44.9 CHRONIC OBSTRUCTIVE PULMONARY DISEASE, UNSPECIFIED: Chronic | Status: ACTIVE | Noted: 2022-03-03

## 2022-03-08 PROBLEM — I48.91 UNSPECIFIED ATRIAL FIBRILLATION: Chronic | Status: ACTIVE | Noted: 2022-03-03

## 2022-03-08 PROBLEM — I63.9 CEREBRAL INFARCTION, UNSPECIFIED: Chronic | Status: ACTIVE | Noted: 2019-07-31

## 2022-03-08 PROBLEM — U07.1 COVID-19: Chronic | Status: ACTIVE | Noted: 2022-03-03

## 2022-03-08 PROBLEM — N18.9 CHRONIC KIDNEY DISEASE, UNSPECIFIED: Chronic | Status: ACTIVE | Noted: 2022-03-03

## 2022-03-08 PROBLEM — C67.9 MALIGNANT NEOPLASM OF BLADDER, UNSPECIFIED: Chronic | Status: ACTIVE | Noted: 2019-07-31

## 2022-03-08 PROBLEM — G47.33 OBSTRUCTIVE SLEEP APNEA (ADULT) (PEDIATRIC): Chronic | Status: ACTIVE | Noted: 2022-03-03

## 2022-03-08 PROBLEM — I50.9 HEART FAILURE, UNSPECIFIED: Chronic | Status: ACTIVE | Noted: 2022-03-03

## 2022-03-10 ENCOUNTER — NON-APPOINTMENT (OUTPATIENT)
Age: 82
End: 2022-03-10

## 2022-03-10 ENCOUNTER — APPOINTMENT (OUTPATIENT)
Dept: CARDIOLOGY | Facility: CLINIC | Age: 82
End: 2022-03-10
Payer: MEDICARE

## 2022-03-10 VITALS
DIASTOLIC BLOOD PRESSURE: 58 MMHG | TEMPERATURE: 97.1 F | SYSTOLIC BLOOD PRESSURE: 102 MMHG | RESPIRATION RATE: 12 BRPM | OXYGEN SATURATION: 99 % | HEIGHT: 66 IN | HEART RATE: 102 BPM

## 2022-03-10 DIAGNOSIS — U07.1 COVID-19: ICD-10-CM

## 2022-03-10 PROCEDURE — 93000 ELECTROCARDIOGRAM COMPLETE: CPT

## 2022-03-10 PROCEDURE — 99214 OFFICE O/P EST MOD 30 MIN: CPT | Mod: CS

## 2022-03-10 NOTE — DISCUSSION/SUMMARY
[Procedure Intermediate Risk] : the procedure risk is intermediate [Patient Intermediate Risk] : the patient is an intermediate risk [Optimized for Surgery] : the patient is optimized for surgery [FreeTextEntry1] : The patient is an 82-year-old [place] gentleman CVA, CKD, BPH, afib, PVD, HFrEF,venous stasis who is euvolemic awaiting hip replacement\par #1 CV- LV function now 55%,  bilateral LE edema resolved\par #2 HFrEF- global LV dysfunction resolved,continue bumex 4mg daily, K20mEq,and metolazone 5mg once a week\par #3 Htn- continue coreg and losartan\par #4 Afib- continue eliquis 5mg bid\par #5 CKD- stable, f/u Dr. Gonzalez\par #6 BPH- continue tamsulosin\par #7 PVD- negative arterial and venous doppler\par #8 Ortho- There are no cardiac contraindications to left hip replacement surgery off eliquis three days prior

## 2022-03-10 NOTE — HISTORY OF PRESENT ILLNESS
[Preoperative Visit] : for a medical evaluation prior to surgery [Scheduled Procedure ___] : a [unfilled] [Date of Surgery ___] : on [unfilled] [Surgeon Name ___] : surgeon: [unfilled] [FreeTextEntry1] : Tim has been feeling doing exercise as much as he can. Walks 400 meters and half hour on bicycle. No CP, palpitations, dizziness or SOB. Uses Mask 5 hours a day.

## 2022-03-10 NOTE — REVIEW OF SYSTEMS
[Negative] : Heme/Lymph [SOB] : no shortness of breath [Dyspnea on exertion] : not dyspnea during exertion [Chest Discomfort] : no chest discomfort [Leg Claudication] : no intermittent leg claudication [Syncope] : no syncope

## 2022-03-15 ENCOUNTER — APPOINTMENT (OUTPATIENT)
Dept: INTERNAL MEDICINE | Facility: CLINIC | Age: 82
End: 2022-03-15
Payer: MEDICARE

## 2022-03-15 VITALS
HEART RATE: 68 BPM | SYSTOLIC BLOOD PRESSURE: 98 MMHG | HEIGHT: 66 IN | TEMPERATURE: 97.6 F | DIASTOLIC BLOOD PRESSURE: 61 MMHG | WEIGHT: 152.12 LBS | BODY MASS INDEX: 24.45 KG/M2 | OXYGEN SATURATION: 98 %

## 2022-03-15 DIAGNOSIS — Z01.818 ENCOUNTER FOR OTHER PREPROCEDURAL EXAMINATION: ICD-10-CM

## 2022-03-15 PROCEDURE — 36415 COLL VENOUS BLD VENIPUNCTURE: CPT

## 2022-03-15 PROCEDURE — 99214 OFFICE O/P EST MOD 30 MIN: CPT | Mod: 25

## 2022-03-15 RX ORDER — TIOTROPIUM BROMIDE AND OLODATEROL 3.124; 2.736 UG/1; UG/1
2.5-2.5 SPRAY, METERED RESPIRATORY (INHALATION) DAILY
Qty: 3 | Refills: 1 | Status: DISCONTINUED | COMMUNITY
Start: 2021-05-19 | End: 2022-03-15

## 2022-03-15 RX ORDER — MELATONIN 5 MG
5 CAPSULE ORAL
Qty: 90 | Refills: 0 | Status: DISCONTINUED | COMMUNITY
Start: 2021-10-28 | End: 2022-03-15

## 2022-03-15 NOTE — ASSESSMENT
[High Risk Surgery - Intraperitoneal, Intrathoracic or Supringuinal Vascular Procedures] : High Risk Surgery - Intraperitoneal, Intrathoracic or Supringuinal Vascular Procedures - No (0) [Ischemic Heart Disease] : Ischemic Heart Disease  - Yes (1) [Congestive Heart Failure] : Congestive Heart Failure - Yes (1) [Prior Cerebrovascular Accident or TIA] : Prior Cerebrovascular Accident or TIA - Yes (1) [Creatinine >= 2mg/dL (1 Point)] : Creatinine >= 2mg/dL - No (0) [Insulin-dependent Diabetic (1 Point)] : Insulin-dependent Diabetic - No (0) [3 - 6] : 3 - 6 , RCRI Class: IV, Risk of Post-Op Cardiac Complications: 15.0%, 95% CI for Risk Estimate: 11.1% - 20.0% [Patient Optimized for Surgery] : Patient optimized for surgery [FreeTextEntry4] : Of note patient is on BiPAP and has sleep apnea.  To bring BiPAP to the OR. [FreeTextEntry5] : Discontinue Eliquis 3 days before surgery

## 2022-03-15 NOTE — HISTORY OF PRESENT ILLNESS
[Atrial Fibrillation] : atrial fibrillation [Coronary Artery Disease] : coronary artery disease [Sleep Apnea] : sleep apnea [No Adverse Anesthesia Reaction] : no adverse anesthesia reaction in self or family member [Chronic Anticoagulation] : no chronic anticoagulation [Chronic Kidney Disease] : no chronic kidney disease [Diabetes] : no diabetes [Moderate (4-6 METs)] : Moderate (4-6 METs) [FreeTextEntry1] : Left hip replacement [FreeTextEntry2] : 3/22/22 [FreeTextEntry3] : Dr. Clark

## 2022-03-18 LAB
ANION GAP SERPL CALC-SCNC: 18 MMOL/L
BUN SERPL-MCNC: 98 MG/DL
CALCIUM SERPL-MCNC: 9.9 MG/DL
CHLORIDE SERPL-SCNC: 92 MMOL/L
CO2 SERPL-SCNC: 32 MMOL/L
CREAT SERPL-MCNC: 2.73 MG/DL
EGFR: 23 ML/MIN/1.73M2
ESTIMATED AVERAGE GLUCOSE: 146 MG/DL
GLUCOSE SERPL-MCNC: 140 MG/DL
HBA1C MFR BLD HPLC: 6.7 %
POTASSIUM SERPL-SCNC: 3.3 MMOL/L
SODIUM SERPL-SCNC: 142 MMOL/L

## 2022-03-19 ENCOUNTER — OUTPATIENT (OUTPATIENT)
Dept: OUTPATIENT SERVICES | Facility: HOSPITAL | Age: 82
LOS: 1 days | End: 2022-03-19

## 2022-03-19 DIAGNOSIS — C67.9 MALIGNANT NEOPLASM OF BLADDER, UNSPECIFIED: Chronic | ICD-10-CM

## 2022-03-19 DIAGNOSIS — Z11.52 ENCOUNTER FOR SCREENING FOR COVID-19: ICD-10-CM

## 2022-03-19 LAB — SARS-COV-2 RNA SPEC QL NAA+PROBE: SIGNIFICANT CHANGE UP

## 2022-03-21 ENCOUNTER — RX RENEWAL (OUTPATIENT)
Age: 82
End: 2022-03-21

## 2022-03-21 ENCOUNTER — FORM ENCOUNTER (OUTPATIENT)
Age: 82
End: 2022-03-21

## 2022-03-22 ENCOUNTER — APPOINTMENT (OUTPATIENT)
Dept: ORTHOPEDIC SURGERY | Facility: HOSPITAL | Age: 82
End: 2022-03-22

## 2022-03-22 ENCOUNTER — INPATIENT (INPATIENT)
Facility: HOSPITAL | Age: 82
LOS: 2 days | Discharge: HOME HEALTH | DRG: 470 | End: 2022-03-25
Attending: ORTHOPAEDIC SURGERY | Admitting: ORTHOPAEDIC SURGERY
Payer: MEDICARE

## 2022-03-22 VITALS
DIASTOLIC BLOOD PRESSURE: 62 MMHG | SYSTOLIC BLOOD PRESSURE: 103 MMHG | RESPIRATION RATE: 14 BRPM | OXYGEN SATURATION: 98 % | TEMPERATURE: 98 F | HEART RATE: 70 BPM | HEIGHT: 66 IN | WEIGHT: 145.95 LBS

## 2022-03-22 DIAGNOSIS — C67.9 MALIGNANT NEOPLASM OF BLADDER, UNSPECIFIED: Chronic | ICD-10-CM

## 2022-03-22 DIAGNOSIS — M16.12 UNILATERAL PRIMARY OSTEOARTHRITIS, LEFT HIP: ICD-10-CM

## 2022-03-22 DIAGNOSIS — Z96.642 PRESENCE OF LEFT ARTIFICIAL HIP JOINT: ICD-10-CM

## 2022-03-22 LAB
GLUCOSE BLDC GLUCOMTR-MCNC: 143 MG/DL — HIGH (ref 70–99)
POTASSIUM SERPL-MCNC: 3.6 MMOL/L — SIGNIFICANT CHANGE UP (ref 3.5–5.3)
POTASSIUM SERPL-SCNC: 3.6 MMOL/L — SIGNIFICANT CHANGE UP (ref 3.5–5.3)

## 2022-03-22 PROCEDURE — 72170 X-RAY EXAM OF PELVIS: CPT | Mod: 26

## 2022-03-22 PROCEDURE — 27130 TOTAL HIP ARTHROPLASTY: CPT | Mod: LT

## 2022-03-22 DEVICE — HEAD FEM CERAMIC V40 36MM OD -2.5MM: Type: IMPLANTABLE DEVICE | Status: FUNCTIONAL

## 2022-03-22 DEVICE — STEM NECK ANG HIP V40 127D SZ 6 35X111MM: Type: IMPLANTABLE DEVICE | Status: FUNCTIONAL

## 2022-03-22 DEVICE — LINER ACET TRIDENT X3 0 DEG 36MM E: Type: IMPLANTABLE DEVICE | Status: FUNCTIONAL

## 2022-03-22 DEVICE — SHELL ACET MULTIHOLE TRIDENT II E 54MM: Type: IMPLANTABLE DEVICE | Status: FUNCTIONAL

## 2022-03-22 RX ORDER — BUMETANIDE 0.25 MG/ML
2 INJECTION INTRAMUSCULAR; INTRAVENOUS DAILY
Refills: 0 | Status: DISCONTINUED | OUTPATIENT
Start: 2022-03-22 | End: 2022-03-25

## 2022-03-22 RX ORDER — TRAMADOL HYDROCHLORIDE 50 MG/1
50 TABLET ORAL EVERY 6 HOURS
Refills: 0 | Status: DISCONTINUED | OUTPATIENT
Start: 2022-03-22 | End: 2022-03-25

## 2022-03-22 RX ORDER — SODIUM CHLORIDE 9 MG/ML
500 INJECTION, SOLUTION INTRAVENOUS ONCE
Refills: 0 | Status: COMPLETED | OUTPATIENT
Start: 2022-03-22 | End: 2022-03-22

## 2022-03-22 RX ORDER — ATORVASTATIN CALCIUM 80 MG/1
40 TABLET, FILM COATED ORAL AT BEDTIME
Refills: 0 | Status: DISCONTINUED | OUTPATIENT
Start: 2022-03-22 | End: 2022-03-25

## 2022-03-22 RX ORDER — TAMSULOSIN HYDROCHLORIDE 0.4 MG/1
0.4 CAPSULE ORAL AT BEDTIME
Refills: 0 | Status: DISCONTINUED | OUTPATIENT
Start: 2022-03-22 | End: 2022-03-25

## 2022-03-22 RX ORDER — POTASSIUM CHLORIDE 20 MEQ
10 PACKET (EA) ORAL
Refills: 0 | Status: DISCONTINUED | OUTPATIENT
Start: 2022-03-22 | End: 2022-03-25

## 2022-03-22 RX ORDER — POLYETHYLENE GLYCOL 3350 17 G/17G
17 POWDER, FOR SOLUTION ORAL AT BEDTIME
Refills: 0 | Status: DISCONTINUED | OUTPATIENT
Start: 2022-03-22 | End: 2022-03-25

## 2022-03-22 RX ORDER — CHLORHEXIDINE GLUCONATE 213 G/1000ML
1 SOLUTION TOPICAL ONCE
Refills: 0 | Status: DISCONTINUED | OUTPATIENT
Start: 2022-03-22 | End: 2022-03-22

## 2022-03-22 RX ORDER — ONDANSETRON 8 MG/1
4 TABLET, FILM COATED ORAL EVERY 6 HOURS
Refills: 0 | Status: DISCONTINUED | OUTPATIENT
Start: 2022-03-22 | End: 2022-03-25

## 2022-03-22 RX ORDER — CEFAZOLIN SODIUM 1 G
2000 VIAL (EA) INJECTION EVERY 8 HOURS
Refills: 0 | Status: COMPLETED | OUTPATIENT
Start: 2022-03-22 | End: 2022-03-23

## 2022-03-22 RX ORDER — PANTOPRAZOLE SODIUM 20 MG/1
40 TABLET, DELAYED RELEASE ORAL ONCE
Refills: 0 | Status: COMPLETED | OUTPATIENT
Start: 2022-03-22 | End: 2022-03-22

## 2022-03-22 RX ORDER — HYDROMORPHONE HYDROCHLORIDE 2 MG/ML
0.25 INJECTION INTRAMUSCULAR; INTRAVENOUS; SUBCUTANEOUS
Refills: 0 | Status: DISCONTINUED | OUTPATIENT
Start: 2022-03-22 | End: 2022-03-22

## 2022-03-22 RX ORDER — FINASTERIDE 5 MG/1
5 TABLET, FILM COATED ORAL DAILY
Refills: 0 | Status: DISCONTINUED | OUTPATIENT
Start: 2022-03-22 | End: 2022-03-25

## 2022-03-22 RX ORDER — LIDOCAINE HCL 20 MG/ML
0.2 VIAL (ML) INJECTION ONCE
Refills: 0 | Status: DISCONTINUED | OUTPATIENT
Start: 2022-03-22 | End: 2022-03-22

## 2022-03-22 RX ORDER — DEXAMETHASONE 0.5 MG/5ML
8 ELIXIR ORAL ONCE
Refills: 0 | Status: COMPLETED | OUTPATIENT
Start: 2022-03-23 | End: 2022-03-23

## 2022-03-22 RX ORDER — ALLOPURINOL 300 MG
100 TABLET ORAL DAILY
Refills: 0 | Status: DISCONTINUED | OUTPATIENT
Start: 2022-03-22 | End: 2022-03-25

## 2022-03-22 RX ORDER — TRAMADOL HYDROCHLORIDE 50 MG/1
50 TABLET ORAL ONCE
Refills: 0 | Status: DISCONTINUED | OUTPATIENT
Start: 2022-03-22 | End: 2022-03-22

## 2022-03-22 RX ORDER — ACETAMINOPHEN 500 MG
1000 TABLET ORAL ONCE
Refills: 0 | Status: COMPLETED | OUTPATIENT
Start: 2022-03-23 | End: 2022-03-23

## 2022-03-22 RX ORDER — SODIUM CHLORIDE 9 MG/ML
1000 INJECTION, SOLUTION INTRAVENOUS
Refills: 0 | Status: DISCONTINUED | OUTPATIENT
Start: 2022-03-22 | End: 2022-03-23

## 2022-03-22 RX ORDER — SODIUM CHLORIDE 9 MG/ML
3 INJECTION INTRAMUSCULAR; INTRAVENOUS; SUBCUTANEOUS EVERY 8 HOURS
Refills: 0 | Status: DISCONTINUED | OUTPATIENT
Start: 2022-03-22 | End: 2022-03-22

## 2022-03-22 RX ORDER — ONDANSETRON 8 MG/1
4 TABLET, FILM COATED ORAL ONCE
Refills: 0 | Status: DISCONTINUED | OUTPATIENT
Start: 2022-03-22 | End: 2022-03-22

## 2022-03-22 RX ORDER — CHOLECALCIFEROL (VITAMIN D3) 125 MCG
1000 CAPSULE ORAL DAILY
Refills: 0 | Status: DISCONTINUED | OUTPATIENT
Start: 2022-03-22 | End: 2022-03-25

## 2022-03-22 RX ORDER — ACETAMINOPHEN 500 MG
1000 TABLET ORAL ONCE
Refills: 0 | Status: COMPLETED | OUTPATIENT
Start: 2022-03-22 | End: 2022-03-22

## 2022-03-22 RX ORDER — OXYCODONE HYDROCHLORIDE 5 MG/1
5 TABLET ORAL EVERY 4 HOURS
Refills: 0 | Status: DISCONTINUED | OUTPATIENT
Start: 2022-03-22 | End: 2022-03-25

## 2022-03-22 RX ORDER — PANTOPRAZOLE SODIUM 20 MG/1
40 TABLET, DELAYED RELEASE ORAL
Refills: 0 | Status: DISCONTINUED | OUTPATIENT
Start: 2022-03-22 | End: 2022-03-25

## 2022-03-22 RX ORDER — SODIUM CHLORIDE 9 MG/ML
500 INJECTION, SOLUTION INTRAVENOUS ONCE
Refills: 0 | Status: COMPLETED | OUTPATIENT
Start: 2022-03-23 | End: 2022-03-23

## 2022-03-22 RX ORDER — PHENYLEPHRINE HYDROCHLORIDE 10 MG/ML
0.5 INJECTION INTRAVENOUS
Qty: 40 | Refills: 0 | Status: DISCONTINUED | OUTPATIENT
Start: 2022-03-22 | End: 2022-03-22

## 2022-03-22 RX ORDER — OXYCODONE HYDROCHLORIDE 5 MG/1
10 TABLET ORAL EVERY 4 HOURS
Refills: 0 | Status: DISCONTINUED | OUTPATIENT
Start: 2022-03-22 | End: 2022-03-25

## 2022-03-22 RX ORDER — APIXABAN 2.5 MG/1
5 TABLET, FILM COATED ORAL EVERY 12 HOURS
Refills: 0 | Status: DISCONTINUED | OUTPATIENT
Start: 2022-03-23 | End: 2022-03-25

## 2022-03-22 RX ORDER — ACETAMINOPHEN 500 MG
975 TABLET ORAL EVERY 8 HOURS
Refills: 0 | Status: DISCONTINUED | OUTPATIENT
Start: 2022-03-23 | End: 2022-03-25

## 2022-03-22 RX ORDER — CARVEDILOL PHOSPHATE 80 MG/1
25 CAPSULE, EXTENDED RELEASE ORAL EVERY 12 HOURS
Refills: 0 | Status: DISCONTINUED | OUTPATIENT
Start: 2022-03-22 | End: 2022-03-25

## 2022-03-22 RX ORDER — FERROUS SULFATE 325(65) MG
325 TABLET ORAL DAILY
Refills: 0 | Status: DISCONTINUED | OUTPATIENT
Start: 2022-03-22 | End: 2022-03-25

## 2022-03-22 RX ORDER — FOLIC ACID 0.8 MG
1 TABLET ORAL DAILY
Refills: 0 | Status: DISCONTINUED | OUTPATIENT
Start: 2022-03-22 | End: 2022-03-25

## 2022-03-22 RX ORDER — SENNA PLUS 8.6 MG/1
2 TABLET ORAL AT BEDTIME
Refills: 0 | Status: DISCONTINUED | OUTPATIENT
Start: 2022-03-22 | End: 2022-03-25

## 2022-03-22 RX ADMIN — Medication 400 MILLIGRAM(S): at 20:27

## 2022-03-22 RX ADMIN — PANTOPRAZOLE SODIUM 40 MILLIGRAM(S): 20 TABLET, DELAYED RELEASE ORAL at 11:35

## 2022-03-22 RX ADMIN — SENNA PLUS 2 TABLET(S): 8.6 TABLET ORAL at 22:17

## 2022-03-22 RX ADMIN — Medication 100 MILLIGRAM(S): at 20:28

## 2022-03-22 RX ADMIN — Medication 1000 MILLIGRAM(S): at 21:00

## 2022-03-22 RX ADMIN — POLYETHYLENE GLYCOL 3350 17 GRAM(S): 17 POWDER, FOR SOLUTION ORAL at 22:18

## 2022-03-22 RX ADMIN — Medication 150 MILLIGRAM(S): at 11:35

## 2022-03-22 RX ADMIN — TAMSULOSIN HYDROCHLORIDE 0.4 MILLIGRAM(S): 0.4 CAPSULE ORAL at 22:17

## 2022-03-22 RX ADMIN — TRAMADOL HYDROCHLORIDE 50 MILLIGRAM(S): 50 TABLET ORAL at 11:36

## 2022-03-22 RX ADMIN — ATORVASTATIN CALCIUM 40 MILLIGRAM(S): 80 TABLET, FILM COATED ORAL at 22:17

## 2022-03-22 RX ADMIN — Medication 10 MILLIEQUIVALENT(S): at 20:27

## 2022-03-22 RX ADMIN — SODIUM CHLORIDE 1000 MILLILITER(S): 9 INJECTION, SOLUTION INTRAVENOUS at 18:18

## 2022-03-22 RX ADMIN — SODIUM CHLORIDE 1000 MILLILITER(S): 9 INJECTION, SOLUTION INTRAVENOUS at 14:34

## 2022-03-22 NOTE — PRE-ANESTHESIA EVALUATION ADULT - NSANTHADDINFOFT_GEN_ALL_CORE
discussed risks/benefits of spinal anesthesia and sedation with patient and family including bleeding/infection/nerve damage, conversion to GETA

## 2022-03-22 NOTE — PHYSICAL THERAPY INITIAL EVALUATION ADULT - GAIT DEVIATIONS NOTED, PT EVAL
decreased mel/increased time in double stance/decreased step length/decreased stride length/decreased weight-shifting ability

## 2022-03-22 NOTE — PHYSICAL THERAPY INITIAL EVALUATION ADULT - PERTINENT HX OF CURRENT PROBLEM, REHAB EVAL
Pt is a 81 y.o. M who c/o L hip pain associated w/ difficulty ambulating. Now s/p L total hip replacement, anterior approach, on 3/22/22.

## 2022-03-22 NOTE — PHYSICAL THERAPY INITIAL EVALUATION ADULT - PLANNED THERAPY INTERVENTIONS, PT EVAL
GOAL: Pt will ascend/descend (3) steps (I) with U HR and step over step pattern in 2 weeks./balance training/bed mobility training/gait training/strengthening/transfer training

## 2022-03-22 NOTE — PRE-ANESTHESIA EVALUATION ADULT - NSANTHSUBSTSD_GEN_ALL_CORE
Left message for patient to call and schedule follow up visit in April or when he returns from [place].     
No

## 2022-03-22 NOTE — PATIENT PROFILE ADULT - FALL HARM RISK - RISK INTERVENTIONS

## 2022-03-22 NOTE — CHART NOTE - NSCHARTNOTEFT_GEN_A_CORE
Patient seen in PACU resting without complaints.  No Chest Pain, SOB, N/V.    T(C): 36.2 (03-22-22 @ 14:12), Max: 36.7 (03-22-22 @ 11:18)  HR: 58 (03-22-22 @ 15:00) (58 - 70)  BP: 92/55 (03-22-22 @ 15:00) (82/50 - 103/62)  RR: 16 (03-22-22 @ 15:00) (14 - 16)  SpO2: 99% (03-22-22 @ 15:00) (98% - 100%)  Wt(kg): --    Exam:  Alert and Oriented, No Acute Distress  Laterality: L hip aquacel in place, C/D/I  Calves soft, non-tender bilaterally  SILT to knee  + DP pulse    Xray:----     03-22    x   |  x   |  x   ----------------------------<  x   3.6   |  x   |  x           A/P: 82yMale S/p L total hip arthroplasty . VSS. NAD  -PT/OT-WBAT LLE, OOB when tolerated  -IS encouraged  -DVT PPx with eliquis  -Followup AM labs  -Pain Control  -PACU to floor    Renee Vasquez PA-C  Team Pager #[phone]

## 2022-03-22 NOTE — PHYSICAL THERAPY INITIAL EVALUATION ADULT - ADDITIONAL COMMENTS
Pt lives with his spouse in a PH +3 steps to enter with HR. All needs met on main floor. PTA Pt was ambulating (I) with use of R/W reports being able to walk 2 blocks with AD prior. Required assist with ADLS from HHA (x40 hours a week M-F) family present to assist when HHA not present. Owns R/W and SC. Wife unable to assist patient upon D/C

## 2022-03-22 NOTE — PRE-ANESTHESIA EVALUATION ADULT - NSANTHPMHFT_GEN_ALL_CORE
This is a  81 y/o male with COPD (on BiPAP 4 hrs per day), HFrEF (now normalized EF), CVA 10 years ago (residual right sided weakness), CKD, RODRIGO, Afib on AC, c/o left hip pain associated with difficulty walking, he presents today for left total hip arthroplasty 3/22/22      Patient denies any acute changes since cardiac/PCP evaluations.  No SOB/CP, new leg swelling.

## 2022-03-23 ENCOUNTER — TRANSCRIPTION ENCOUNTER (OUTPATIENT)
Age: 82
End: 2022-03-23

## 2022-03-23 LAB
ANION GAP SERPL CALC-SCNC: 16 MMOL/L — SIGNIFICANT CHANGE UP (ref 5–17)
BUN SERPL-MCNC: 90 MG/DL — HIGH (ref 7–23)
CALCIUM SERPL-MCNC: 9.4 MG/DL — SIGNIFICANT CHANGE UP (ref 8.4–10.5)
CHLORIDE SERPL-SCNC: 89 MMOL/L — LOW (ref 96–108)
CO2 SERPL-SCNC: 31 MMOL/L — SIGNIFICANT CHANGE UP (ref 22–31)
CREAT SERPL-MCNC: 2.32 MG/DL — HIGH (ref 0.5–1.3)
EGFR: 28 ML/MIN/1.73M2 — LOW
GLUCOSE SERPL-MCNC: 146 MG/DL — HIGH (ref 70–99)
HCT VFR BLD CALC: 31.3 % — LOW (ref 39–50)
HGB BLD-MCNC: 10.1 G/DL — LOW (ref 13–17)
MCHC RBC-ENTMCNC: 30.4 PG — SIGNIFICANT CHANGE UP (ref 27–34)
MCHC RBC-ENTMCNC: 32.3 GM/DL — SIGNIFICANT CHANGE UP (ref 32–36)
MCV RBC AUTO: 94.3 FL — SIGNIFICANT CHANGE UP (ref 80–100)
NRBC # BLD: 0 /100 WBCS — SIGNIFICANT CHANGE UP (ref 0–0)
PLATELET # BLD AUTO: 120 K/UL — LOW (ref 150–400)
POTASSIUM SERPL-MCNC: 3.6 MMOL/L — SIGNIFICANT CHANGE UP (ref 3.5–5.3)
POTASSIUM SERPL-SCNC: 3.6 MMOL/L — SIGNIFICANT CHANGE UP (ref 3.5–5.3)
RBC # BLD: 3.32 M/UL — LOW (ref 4.2–5.8)
RBC # FLD: 15.1 % — HIGH (ref 10.3–14.5)
SODIUM SERPL-SCNC: 136 MMOL/L — SIGNIFICANT CHANGE UP (ref 135–145)
WBC # BLD: 9.26 K/UL — SIGNIFICANT CHANGE UP (ref 3.8–10.5)
WBC # FLD AUTO: 9.26 K/UL — SIGNIFICANT CHANGE UP (ref 3.8–10.5)

## 2022-03-23 RX ORDER — ACETAMINOPHEN 500 MG
3 TABLET ORAL
Qty: 0 | Refills: 0 | DISCHARGE
Start: 2022-03-23

## 2022-03-23 RX ORDER — TAMSULOSIN HYDROCHLORIDE 0.4 MG/1
1 CAPSULE ORAL
Qty: 0 | Refills: 0 | DISCHARGE
Start: 2022-03-23

## 2022-03-23 RX ORDER — ATORVASTATIN CALCIUM 80 MG/1
1 TABLET, FILM COATED ORAL
Qty: 0 | Refills: 0 | DISCHARGE

## 2022-03-23 RX ORDER — BUMETANIDE 0.25 MG/ML
1 INJECTION INTRAMUSCULAR; INTRAVENOUS
Qty: 0 | Refills: 0 | DISCHARGE
Start: 2022-03-23

## 2022-03-23 RX ORDER — BUMETANIDE 0.25 MG/ML
2 INJECTION INTRAMUSCULAR; INTRAVENOUS
Qty: 0 | Refills: 0 | DISCHARGE

## 2022-03-23 RX ORDER — TRAMADOL HYDROCHLORIDE 50 MG/1
1 TABLET ORAL
Qty: 0 | Refills: 0 | DISCHARGE
Start: 2022-03-23

## 2022-03-23 RX ORDER — ALLOPURINOL 300 MG
1 TABLET ORAL
Qty: 0 | Refills: 0 | DISCHARGE
Start: 2022-03-23

## 2022-03-23 RX ORDER — TAMSULOSIN HYDROCHLORIDE 0.4 MG/1
1 CAPSULE ORAL
Qty: 0 | Refills: 0 | DISCHARGE

## 2022-03-23 RX ORDER — SENNA PLUS 8.6 MG/1
2 TABLET ORAL
Qty: 0 | Refills: 0 | DISCHARGE
Start: 2022-03-23

## 2022-03-23 RX ORDER — ALLOPURINOL 300 MG
1 TABLET ORAL
Qty: 0 | Refills: 0 | DISCHARGE

## 2022-03-23 RX ORDER — SODIUM CHLORIDE 9 MG/ML
1000 INJECTION INTRAMUSCULAR; INTRAVENOUS; SUBCUTANEOUS
Refills: 0 | Status: DISCONTINUED | OUTPATIENT
Start: 2022-03-23 | End: 2022-03-25

## 2022-03-23 RX ORDER — PANTOPRAZOLE SODIUM 20 MG/1
1 TABLET, DELAYED RELEASE ORAL
Qty: 0 | Refills: 0 | DISCHARGE
Start: 2022-03-23

## 2022-03-23 RX ORDER — CARVEDILOL PHOSPHATE 80 MG/1
1 CAPSULE, EXTENDED RELEASE ORAL
Qty: 0 | Refills: 0 | DISCHARGE

## 2022-03-23 RX ORDER — OXYCODONE HYDROCHLORIDE 5 MG/1
1 TABLET ORAL
Qty: 0 | Refills: 0 | DISCHARGE
Start: 2022-03-23

## 2022-03-23 RX ORDER — POLYETHYLENE GLYCOL 3350 17 G/17G
17 POWDER, FOR SOLUTION ORAL
Qty: 0 | Refills: 0 | DISCHARGE
Start: 2022-03-23

## 2022-03-23 RX ORDER — APIXABAN 2.5 MG/1
1 TABLET, FILM COATED ORAL
Qty: 0 | Refills: 0 | DISCHARGE
Start: 2022-03-23

## 2022-03-23 RX ORDER — CARVEDILOL PHOSPHATE 80 MG/1
1 CAPSULE, EXTENDED RELEASE ORAL
Qty: 0 | Refills: 0 | DISCHARGE
Start: 2022-03-23

## 2022-03-23 RX ORDER — ATORVASTATIN CALCIUM 80 MG/1
1 TABLET, FILM COATED ORAL
Qty: 0 | Refills: 0 | DISCHARGE
Start: 2022-03-23

## 2022-03-23 RX ADMIN — TAMSULOSIN HYDROCHLORIDE 0.4 MILLIGRAM(S): 0.4 CAPSULE ORAL at 21:07

## 2022-03-23 RX ADMIN — APIXABAN 5 MILLIGRAM(S): 2.5 TABLET, FILM COATED ORAL at 17:30

## 2022-03-23 RX ADMIN — Medication 100 MILLIGRAM(S): at 11:03

## 2022-03-23 RX ADMIN — ATORVASTATIN CALCIUM 40 MILLIGRAM(S): 80 TABLET, FILM COATED ORAL at 21:07

## 2022-03-23 RX ADMIN — SODIUM CHLORIDE 75 MILLILITER(S): 9 INJECTION INTRAMUSCULAR; INTRAVENOUS; SUBCUTANEOUS at 13:34

## 2022-03-23 RX ADMIN — Medication 10 MILLIEQUIVALENT(S): at 05:25

## 2022-03-23 RX ADMIN — FINASTERIDE 5 MILLIGRAM(S): 5 TABLET, FILM COATED ORAL at 11:04

## 2022-03-23 RX ADMIN — PANTOPRAZOLE SODIUM 40 MILLIGRAM(S): 20 TABLET, DELAYED RELEASE ORAL at 05:25

## 2022-03-23 RX ADMIN — Medication 100 MILLIGRAM(S): at 04:16

## 2022-03-23 RX ADMIN — Medication 1000 MILLIGRAM(S): at 05:00

## 2022-03-23 RX ADMIN — SENNA PLUS 2 TABLET(S): 8.6 TABLET ORAL at 21:07

## 2022-03-23 RX ADMIN — Medication 400 MILLIGRAM(S): at 04:16

## 2022-03-23 RX ADMIN — Medication 1 TABLET(S): at 11:03

## 2022-03-23 RX ADMIN — Medication 1000 MILLIGRAM(S): at 12:25

## 2022-03-23 RX ADMIN — Medication 1000 UNIT(S): at 11:03

## 2022-03-23 RX ADMIN — Medication 975 MILLIGRAM(S): at 21:07

## 2022-03-23 RX ADMIN — APIXABAN 5 MILLIGRAM(S): 2.5 TABLET, FILM COATED ORAL at 06:10

## 2022-03-23 RX ADMIN — Medication 400 MILLIGRAM(S): at 12:10

## 2022-03-23 RX ADMIN — POLYETHYLENE GLYCOL 3350 17 GRAM(S): 17 POWDER, FOR SOLUTION ORAL at 21:10

## 2022-03-23 RX ADMIN — SODIUM CHLORIDE 1000 MILLILITER(S): 9 INJECTION, SOLUTION INTRAVENOUS at 05:30

## 2022-03-23 RX ADMIN — Medication 1 MILLIGRAM(S): at 11:03

## 2022-03-23 RX ADMIN — Medication 101.6 MILLIGRAM(S): at 05:25

## 2022-03-23 RX ADMIN — Medication 975 MILLIGRAM(S): at 21:45

## 2022-03-23 RX ADMIN — Medication 10 MILLIEQUIVALENT(S): at 17:30

## 2022-03-23 RX ADMIN — Medication 325 MILLIGRAM(S): at 11:03

## 2022-03-23 NOTE — PROVIDER CONTACT NOTE (CHANGE IN STATUS NOTIFICATION) - ASSESSMENT
Pt A&Ox4. BP has been running soft. PT attempted to see pt but BP dropped from 123/63 to 82/47. Pt states "I feel tired." Pt denies chest pain, headache or dizziness.

## 2022-03-23 NOTE — OCCUPATIONAL THERAPY INITIAL EVALUATION ADULT - ADDITIONAL COMMENTS
Lives in  with wife, 3 BRU[place], [place] M-F, 40 hours/week who assists with ADLs, family assist on weekends. Walk in shower +shower chair

## 2022-03-23 NOTE — OCCUPATIONAL THERAPY INITIAL EVALUATION ADULT - PERTINENT HX OF CURRENT PROBLEM, REHAB EVAL
81 y.o. M who c/o L hip pain associated w/ difficulty ambulating. Now s/p L total hip replacement, anterior approach, on 3/22/22.

## 2022-03-23 NOTE — PROVIDER CONTACT NOTE (CHANGE IN STATUS NOTIFICATION) - ACTION/TREATMENT ORDERED:
Ludwig GEE made aware. Ordered NS running at 100mL/hr. Will continue to monitor pt. Ludwig GEE made aware. Ordered NS running at 75mL/hr. Will continue to monitor pt.

## 2022-03-23 NOTE — DISCHARGE NOTE PROVIDER - NSDCCPCAREPLAN_GEN_ALL_CORE_FT
PRINCIPAL DISCHARGE DIAGNOSIS  Diagnosis: Unilateral osteoarthritis of hip, left  Assessment and Plan of Treatment:

## 2022-03-23 NOTE — DISCHARGE NOTE PROVIDER - HOSPITAL COURSE
History of Present Illness    This is a  80 y/o male c/o left hip pain associated with difficulty walking, he presents today for left total hip arthroplasty 3/22/22  covid swab to be done 3/19 at [facility]  denies recent travel, or covid infections    Allergies/Medications:   Allergies:        Allergies:  	No Known Allergies:     Home Medications:   * Patient Currently Takes Medications as of 03-Mar-2022 18:40 documented in Structured Notes  · 	Eliquis 5 mg oral tablet: Last Dose Taken:  , 1 tab(s) orally every 12 hours  · 	finasteride 5 mg oral tablet: Last Dose Taken:  , 1 tab(s) orally once a day  · 	allopurinol 100 mg oral tablet: Last Dose Taken:  , 1 tab(s) orally once a day  · 	atorvastatin 40 mg oral tablet: Last Dose Taken:  , 1 tab(s) orally once a day (at bedtime)  · 	bumetanide 2 mg oral tablet: Last Dose Taken:  , 2 tab(s) orally once a day  · 	carvedilol 25 mg oral tablet: Last Dose Taken:  , 1 tab(s) orally 2 times a day  · 	Vitamin D3 25 mcg (1000 intl units) oral tablet, chewable: 1 tab(s) orally once a day  · 	ferrous sulfate 324 mg (65 mg elemental iron) oral delayed release tablet: Last Dose Taken:  , 1 tab(s) orally once a day  · 	folic acid 1 mg oral tablet: 1 tab(s) orally once a day  · 	metOLazone 5 mg oral tablet: 1 tab(s) orally once a day on monday  · 	potassium chloride 10 mEq oral capsule, extended release: 1 cap(s) orally 2 times a day  · 	tamsulosin 0.4 mg oral capsule: 1 cap(s) orally once a day    PMH/PSH/FH/SH:    Past Medical, Past Surgical, and Family History:  PAST MEDICAL HISTORY:  2019 novel coronavirus disease (COVID-19) 2/2021, quarantine, no intubation  Atrial fibrillation   Bladder cancer 2019  BPH (Benign Prostatic Hypertrophy)   Cerebrovascular accident (CVA) 2010  Chronic heart failure   Chronic kidney disease, unspecified CKD stage   Chronic obstructive pulmonary disease, unspecified COPD type use BIPAP machine  day time 4 hours  Diabetes mellitus   Gouty arthritis   Hyperlipidemia   Hypertension   Obstructive sleep apnea.     PAST SURGICAL HISTORY:  Bilateral Inguinal Hernia (BIH)   Bladder cancer cysto  TURBT.     Patient presents to the hospital for elective surgery, underwent a left total hip replacement-  -tolerated procedure without complications.  Patient was evaluated by Physical therapy whom recommended home with out patient PT for discharge plan.  Patient is stable for discharge when cleared by PT.     History of Present Illness    This is a  80 y/o male c/o left hip pain associated with difficulty walking, he presents today for left total hip arthroplasty 3/22/22  covid swab to be done 3/19 at [facility]    Goal: need hip replacement to be pain free and walk better  denies recent travel, or covid infections    Allergies/Medications:   Allergies:        Allergies:  	No Known Allergies:     Home Medications:   * Patient Currently Takes Medications as of 03-Mar-2022 18:40 documented in Structured Notes  · 	Eliquis 5 mg oral tablet: Last Dose Taken:  , 1 tab(s) orally every 12 hours  · 	finasteride 5 mg oral tablet: Last Dose Taken:  , 1 tab(s) orally once a day  · 	allopurinol 100 mg oral tablet: Last Dose Taken:  , 1 tab(s) orally once a day  · 	atorvastatin 40 mg oral tablet: Last Dose Taken:  , 1 tab(s) orally once a day (at bedtime)  · 	bumetanide 2 mg oral tablet: Last Dose Taken:  , 2 tab(s) orally once a day  · 	carvedilol 25 mg oral tablet: Last Dose Taken:  , 1 tab(s) orally 2 times a day  · 	Vitamin D3 25 mcg (1000 intl units) oral tablet, chewable: 1 tab(s) orally once a day  · 	ferrous sulfate 324 mg (65 mg elemental iron) oral delayed release tablet: Last Dose Taken:  , 1 tab(s) orally once a day  · 	folic acid 1 mg oral tablet: 1 tab(s) orally once a day  · 	metOLazone 5 mg oral tablet: 1 tab(s) orally once a day on monday  · 	potassium chloride 10 mEq oral capsule, extended release: 1 cap(s) orally 2 times a day  · 	tamsulosin 0.4 mg oral capsule: 1 cap(s) orally once a day    PMH/PSH/FH/SH:    Past Medical, Past Surgical, and Family History:  PAST MEDICAL HISTORY:  2019 novel coronavirus disease (COVID-19) 2/2021, quarantine, no intubation  Atrial fibrillation   Bladder cancer 2019  BPH (Benign Prostatic Hypertrophy)   Cerebrovascular accident (CVA) 2010  Chronic heart failure   Chronic kidney disease, unspecified CKD stage   Chronic obstructive pulmonary disease, unspecified COPD type use BIPAP machine  day time 4 hours  Diabetes mellitus   Gouty arthritis   Hyperlipidemia   Hypertension   Obstructive sleep apnea.     PAST SURGICAL HISTORY:  Bilateral Inguinal Hernia (BIH)   Bladder cancer cysto  TURBT.     Patient presents to the hospital for elective surgery, underwent a left total hip replacement-  -tolerated procedure without complications.  Patient was evaluated by Physical therapy whom recommended home with out patient PT for discharge plan.  Patient is stable for discharge when cleared by PT.     History of Present Illness    This is a  80 y/o male c/o left hip pain associated with difficulty walking, he presents today for left total hip arthroplasty 3/22/22  covid swab to be done 3/19 at [facility]    Goal: need hip replacement to be pain free and walk better  denies recent travel, or covid infections    Allergies/Medications:   Allergies:        Allergies:  	No Known Allergies:     Home Medications:   * Patient Currently Takes Medications as of 03-Mar-2022 18:40 documented in Structured Notes  · 	Eliquis 5 mg oral tablet: Last Dose Taken:  , 1 tab(s) orally every 12 hours  · 	finasteride 5 mg oral tablet: Last Dose Taken:  , 1 tab(s) orally once a day  · 	allopurinol 100 mg oral tablet: Last Dose Taken:  , 1 tab(s) orally once a day  · 	atorvastatin 40 mg oral tablet: Last Dose Taken:  , 1 tab(s) orally once a day (at bedtime)  · 	bumetanide 2 mg oral tablet: Last Dose Taken:  , 2 tab(s) orally once a day  · 	carvedilol 25 mg oral tablet: Last Dose Taken:  , 1 tab(s) orally 2 times a day  · 	Vitamin D3 25 mcg (1000 intl units) oral tablet, chewable: 1 tab(s) orally once a day  · 	ferrous sulfate 324 mg (65 mg elemental iron) oral delayed release tablet: Last Dose Taken:  , 1 tab(s) orally once a day  · 	folic acid 1 mg oral tablet: 1 tab(s) orally once a day  · 	metOLazone 5 mg oral tablet: 1 tab(s) orally once a day on monday  · 	potassium chloride 10 mEq oral capsule, extended release: 1 cap(s) orally 2 times a day  · 	tamsulosin 0.4 mg oral capsule: 1 cap(s) orally once a day    PMH/PSH/FH/SH:    Past Medical, Past Surgical, and Family History:  PAST MEDICAL HISTORY:  2019 novel coronavirus disease (COVID-19) 2/2021, quarantine, no intubation  Atrial fibrillation   Bladder cancer 2019  BPH (Benign Prostatic Hypertrophy)   Cerebrovascular accident (CVA) 2010  Chronic heart failure   Chronic kidney disease, unspecified CKD stage   Chronic obstructive pulmonary disease, unspecified COPD type use BIPAP machine  day time 4 hours  Diabetes mellitus   Gouty arthritis   Hyperlipidemia   Hypertension   Obstructive sleep apnea.     PAST SURGICAL HISTORY:  Bilateral Inguinal Hernia (BIH)   Bladder cancer cysto  TURBT.     Patient presents to the hospital for elective surgery, underwent a left total hip replacement-  -tolerated procedure without complications.  Patient was evaluated by Physical therapy whom recommended home with out patient PT for discharge plan.  Patient was transfused perioperatively for acute blood loss anemia.  Patient is stable for discharge when cleared by PT.

## 2022-03-23 NOTE — DISCHARGE NOTE PROVIDER - CARE PROVIDER_API CALL
Costa Foster)  Orthopaedic Surgery  [address], [address]  [place] [address]  Phone: [phone]  Fax: [phone]  Follow Up Time:

## 2022-03-23 NOTE — DISCHARGE NOTE PROVIDER - NSDCFUADDINST_GEN_ALL_CORE_FT
Dressing will be changed during office visit.   Patient may shower, limit direct water to dressing, if wet pat dry   Out of bed, ambulate, weight bearing as tolerated-   Physical therapy to assist with exercise and help increase endurance.   Please contact Doctors office regarding arrangements for out patient Physical therapy.

## 2022-03-23 NOTE — DISCHARGE NOTE PROVIDER - NSDCFUADDAPPT_GEN_ALL_CORE_FT
Please call Dr. Foster' s office within next few days to schedule a follow up appointment about 14 days after surgery.   Recommend follow up with medical MD, within next 4 weeks.

## 2022-03-23 NOTE — DISCHARGE NOTE PROVIDER - NSDCMRMEDTOKEN_GEN_ALL_CORE_FT
acetaminophen 325 mg oral tablet: 3 tab(s) orally every 8 hours x 7 days, then as needed  allopurinol 100 mg oral tablet: 1 tab(s) orally once a day  apixaban 5 mg oral tablet: 1 tab(s) orally every 12 hours  atorvastatin 40 mg oral tablet: 1 tab(s) orally once a day (at bedtime)  bumetanide 2 mg oral tablet: 1 tab(s) orally once a day  carvedilol 25 mg oral tablet: 1 tab(s) orally every 12 hours  ferrous sulfate 324 mg (65 mg elemental iron) oral delayed release tablet: 1 tab(s) orally once a day  finasteride 5 mg oral tablet: 1 tab(s) orally once a day  folic acid 1 mg oral tablet: 1 tab(s) orally once a day  metOLazone 5 mg oral tablet: 1 tab(s) orally once a day on monday  Multiple Vitamins oral tablet: 1 tab(s) orally once a day  oxyCODONE 5 mg oral tablet: 1 or 2 tab(s) orally every 4 hours, as needed for moderate to severe pain  pantoprazole 40 mg oral delayed release tablet: 1 tab(s) orally once a day (before a meal)  polyethylene glycol 3350 oral powder for reconstitution: 17 gram(s) orally once a day (at bedtime)  potassium chloride 10 mEq oral capsule, extended release: 1 cap(s) orally 2 times a day  senna oral tablet: 2 tab(s) orally once a day (at bedtime)  tamsulosin 0.4 mg oral capsule: 1 cap(s) orally once a day (at bedtime)  traMADol 50 mg oral tablet: 1 tab(s) orally every 6 hours, As needed, Mild Pain (1 - 3)  Vitamin D3 25 mcg (1000 intl units) oral tablet, chewable: 1 tab(s) orally once a day   acetaminophen 325 mg oral tablet: 3 tab(s) orally every 8 hours x 7 days, then as needed  allopurinol 100 mg oral tablet: 1 tab(s) orally once a day  apixaban 5 mg oral tablet: 1 tab(s) orally every 12 hours  atorvastatin 40 mg oral tablet: 1 tab(s) orally once a day (at bedtime)  bumetanide 2 mg oral tablet: 1 tab(s) orally once a day  carvedilol 25 mg oral tablet: 1 tab(s) orally every 12 hours  ferrous sulfate 324 mg (65 mg elemental iron) oral delayed release tablet: 1 tab(s) orally once a day  finasteride 5 mg oral tablet: 1 tab(s) orally once a day  folic acid 1 mg oral tablet: 1 tab(s) orally once a day  metOLazone 5 mg oral tablet: 1 tab(s) orally once a day on monday  oxyCODONE 5 mg oral tablet: 1 or 2 tab(s) orally every 4 hours, as needed for moderate to severe pain  oxyCODONE 5 mg oral tablet: 1 or 2 tab(s) orally every 4 hours, as needed for moderate to severe pain MDD:6  polyethylene glycol 3350 oral powder for reconstitution: 17 gram(s) orally once a day (at bedtime)  potassium chloride 10 mEq oral capsule, extended release: 1 cap(s) orally 2 times a day  senna oral tablet: 2 tab(s) orally once a day (at bedtime)  tamsulosin 0.4 mg oral capsule: 1 cap(s) orally once a day (at bedtime)  traMADol 50 mg oral tablet: 1 tab(s) orally every 6 hours, As needed, moderate pain MDD:4  Vitamin D3 25 mcg (1000 intl units) oral tablet, chewable: 1 tab(s) orally once a day

## 2022-03-24 LAB
ANION GAP SERPL CALC-SCNC: 12 MMOL/L — SIGNIFICANT CHANGE UP (ref 5–17)
BUN SERPL-MCNC: 94 MG/DL — HIGH (ref 7–23)
CALCIUM SERPL-MCNC: 8.2 MG/DL — LOW (ref 8.4–10.5)
CHLORIDE SERPL-SCNC: 95 MMOL/L — LOW (ref 96–108)
CO2 SERPL-SCNC: 30 MMOL/L — SIGNIFICANT CHANGE UP (ref 22–31)
CREAT SERPL-MCNC: 2.4 MG/DL — HIGH (ref 0.5–1.3)
EGFR: 27 ML/MIN/1.73M2 — LOW
GLUCOSE SERPL-MCNC: 169 MG/DL — HIGH (ref 70–99)
HCT VFR BLD CALC: 26.6 % — LOW (ref 39–50)
HGB BLD-MCNC: 8.5 G/DL — LOW (ref 13–17)
MCHC RBC-ENTMCNC: 30.9 PG — SIGNIFICANT CHANGE UP (ref 27–34)
MCHC RBC-ENTMCNC: 32 GM/DL — SIGNIFICANT CHANGE UP (ref 32–36)
MCV RBC AUTO: 96.7 FL — SIGNIFICANT CHANGE UP (ref 80–100)
NRBC # BLD: 0 /100 WBCS — SIGNIFICANT CHANGE UP (ref 0–0)
PLATELET # BLD AUTO: 104 K/UL — LOW (ref 150–400)
POTASSIUM SERPL-MCNC: 3.6 MMOL/L — SIGNIFICANT CHANGE UP (ref 3.5–5.3)
POTASSIUM SERPL-SCNC: 3.6 MMOL/L — SIGNIFICANT CHANGE UP (ref 3.5–5.3)
RBC # BLD: 2.75 M/UL — LOW (ref 4.2–5.8)
RBC # FLD: 15.3 % — HIGH (ref 10.3–14.5)
SODIUM SERPL-SCNC: 137 MMOL/L — SIGNIFICANT CHANGE UP (ref 135–145)
WBC # BLD: 9.96 K/UL — SIGNIFICANT CHANGE UP (ref 3.8–10.5)
WBC # FLD AUTO: 9.96 K/UL — SIGNIFICANT CHANGE UP (ref 3.8–10.5)

## 2022-03-24 RX ADMIN — PANTOPRAZOLE SODIUM 40 MILLIGRAM(S): 20 TABLET, DELAYED RELEASE ORAL at 05:12

## 2022-03-24 RX ADMIN — Medication 975 MILLIGRAM(S): at 06:12

## 2022-03-24 RX ADMIN — POLYETHYLENE GLYCOL 3350 17 GRAM(S): 17 POWDER, FOR SOLUTION ORAL at 22:14

## 2022-03-24 RX ADMIN — SENNA PLUS 2 TABLET(S): 8.6 TABLET ORAL at 22:14

## 2022-03-24 RX ADMIN — Medication 975 MILLIGRAM(S): at 05:12

## 2022-03-24 RX ADMIN — Medication 975 MILLIGRAM(S): at 12:35

## 2022-03-24 RX ADMIN — Medication 975 MILLIGRAM(S): at 11:34

## 2022-03-24 RX ADMIN — Medication 1 MILLIGRAM(S): at 11:33

## 2022-03-24 RX ADMIN — Medication 1 TABLET(S): at 11:34

## 2022-03-24 RX ADMIN — Medication 975 MILLIGRAM(S): at 21:00

## 2022-03-24 RX ADMIN — ATORVASTATIN CALCIUM 40 MILLIGRAM(S): 80 TABLET, FILM COATED ORAL at 22:14

## 2022-03-24 RX ADMIN — Medication 10 MILLIEQUIVALENT(S): at 05:12

## 2022-03-24 RX ADMIN — FINASTERIDE 5 MILLIGRAM(S): 5 TABLET, FILM COATED ORAL at 11:33

## 2022-03-24 RX ADMIN — TAMSULOSIN HYDROCHLORIDE 0.4 MILLIGRAM(S): 0.4 CAPSULE ORAL at 22:14

## 2022-03-24 RX ADMIN — Medication 1000 UNIT(S): at 11:34

## 2022-03-24 RX ADMIN — APIXABAN 5 MILLIGRAM(S): 2.5 TABLET, FILM COATED ORAL at 17:21

## 2022-03-24 RX ADMIN — Medication 100 MILLIGRAM(S): at 11:33

## 2022-03-24 RX ADMIN — Medication 10 MILLIEQUIVALENT(S): at 17:22

## 2022-03-24 RX ADMIN — Medication 975 MILLIGRAM(S): at 20:52

## 2022-03-24 RX ADMIN — APIXABAN 5 MILLIGRAM(S): 2.5 TABLET, FILM COATED ORAL at 05:12

## 2022-03-24 RX ADMIN — Medication 325 MILLIGRAM(S): at 11:35

## 2022-03-24 NOTE — CONSULT NOTE ADULT - SUBJECTIVE AND OBJECTIVE BOX
Wound Surgery Consult Note:    HPI:  This is a  80 y/o male c/o left hip pain associated with difficulty walking, he presents today for left total hip arthroplasty 3/22/22  covid swab to be done 3/19 at [facility], denies recent travel, or covid infections (03 Mar 2022 18:26)    Request for wound care consult for sacral/bilateral buttocks skin breakdown received from nursing. Mr. Geiger was encountered sitting up in a chair and has been maintained on an alternating air with low air loss surface. His immobility, inactivity, as well as poor nutritional status all contribute to his high risk for pressure injury development and hinder healing. Mr. Geiger stated that he had a sacral/buttocks pressure injury in the past that he thinks is now healed. On exam, dusky, dark discoloration is noted. Given recent immobility and inactivity, a deep tissue injury in evolution can not be ruled out. Identification of the initial signs of deep tissue damage at the level of the skin within 72 hours of admission make this an injury that occurred prior to admission.    PAST MEDICAL & SURGICAL HISTORY:  Hypertension  Hyperlipidemia  BPH (Benign Prostatic Hypertrophy)  Bladder cancer, 2019  Gouty arthritis  Diabetes mellitus  Cerebrovascular accident (CVA), 2010  Atrial fibrillation  Chronic kidney disease, unspecified CKD stage  Chronic obstructive pulmonary disease, unspecified COPD type  use BIPAP machine  day time 4 hours  Obstructive sleep apnea  2019 novel coronavirus disease (COVID-19), 2/2021, quarantine, no intubation  Chronic heart failure  Bilateral Inguinal Hernia (BIH)  Bladder cancer, cysto  TURBT    MEDICATIONS  (STANDING):  acetaminophen     Tablet .. 975 milliGRAM(s) Oral every 8 hours  allopurinol 100 milliGRAM(s) Oral daily  apixaban 5 milliGRAM(s) Oral every 12 hours  atorvastatin 40 milliGRAM(s) Oral at bedtime  buMETAnide 2 milliGRAM(s) Oral daily  carvedilol 25 milliGRAM(s) Oral every 12 hours  ceFAZolin   IVPB 2000 milliGRAM(s) IV Intermittent once  cholecalciferol 1000 Unit(s) Oral daily  ferrous    sulfate 325 milliGRAM(s) Oral daily  finasteride 5 milliGRAM(s) Oral daily  folic acid 1 milliGRAM(s) Oral daily  multivitamin 1 Tablet(s) Oral daily  pantoprazole    Tablet 40 milliGRAM(s) Oral before breakfast  polyethylene glycol 3350 17 Gram(s) Oral at bedtime  potassium chloride    Tablet ER 10 milliEquivalent(s) Oral two times a day  senna 2 Tablet(s) Oral at bedtime  sodium chloride 0.9%. 1000 milliLiter(s) (75 mL/Hr) IV Continuous <Continuous>  tamsulosin 0.4 milliGRAM(s) Oral at bedtime    MEDICATIONS  (PRN):  aluminum hydroxide/magnesium hydroxide/simethicone Suspension 30 milliLiter(s) Oral four times a day PRN Indigestion  bisacodyl Suppository 10 milliGRAM(s) Rectal once PRN Constipation  ondansetron Injectable 4 milliGRAM(s) IV Push every 6 hours PRN Nausea and/or Vomiting  oxyCODONE    IR 5 milliGRAM(s) Oral every 4 hours PRN Moderate Pain (4 - 6)  oxyCODONE    IR 10 milliGRAM(s) Oral every 4 hours PRN Severe Pain (7 - 10)  traMADol 50 milliGRAM(s) Oral every 6 hours PRN Mild Pain (1 - 3)    Allergies    No Known Allergies    Intolerances    Vital Signs Last 24 Hrs  T(C): 36.5 (24 Mar 2022 08:40), Max: 36.8 (23 Mar 2022 22:50)  T(F): 97.7 (24 Mar 2022 08:40), Max: 98.2 (23 Mar 2022 22:50)  HR: 68 (24 Mar 2022 08:40) (62 - 76)  BP: 107/64 (24 Mar 2022 08:40) (95/58 - 107/64)  BP(mean): --  RR: 18 (24 Mar 2022 08:40) (18 - 18)  SpO2: 97% (24 Mar 2022 08:40) (96% - 100%)    Physical Exam:  General: Alert, NAD  Respiratory: no SOB on room air  Gastrointestinal: soft NT/ND  Neurology: weakened strength & sensation grossly intact  Musculoskeletal: no contractures  Vascular: BLE edema equal  Skin:  Sacral/bilateral buttocks with central area of dusky, hyperpigmented intact skin, no drainage  No odor, erythema, increased warmth, tenderness, induration, fluctuance    LABS:  03-24    137  |  95<L>  |  94<H>  ----------------------------<  169<H>  3.6   |  30  |  2.40<H>    Ca    8.2<L>      24 Mar 2022 09:10                            8.5    9.96  )-----------( 104      ( 24 Mar 2022 09:10 )             26.6

## 2022-03-24 NOTE — CONSULT NOTE ADULT - ASSESSMENT
Impression:    Sacral/bilateral Buttocks deep tissue injury present on admission    Recommend:  1.) topical therapy: sacral/buttock injury - cleanse with incontinence cleanser, pat dry, apply Triad ointment twice daily and PRN for incontinent episodes  2.) Incontinence Management - incontinence cleanser, pads, pericare BID, avoid diapers  3.) Maintain on an alternating air with low air loss surface  4.) Turn and reposition Q 2 hours  5.) Nutrition optimization  6.) Offload heels/feet with complete cair air fluidized boots; ensure that the soles of the feet are not resting on the foot board of the bed.    Care as per medicine. Will not actively follow but will remain available. Please recall for new issues of deterioration.  Upon discharge [facility] [address] [place] [address] [phone]  Thank you for this consult  Meme Ghotra, NP-C, CWOCN [id number]

## 2022-03-25 ENCOUNTER — TRANSCRIPTION ENCOUNTER (OUTPATIENT)
Age: 82
End: 2022-03-25

## 2022-03-25 VITALS — HEART RATE: 76 BPM | DIASTOLIC BLOOD PRESSURE: 70 MMHG | SYSTOLIC BLOOD PRESSURE: 109 MMHG

## 2022-03-25 LAB
BLD GP AB SCN SERPL QL: NEGATIVE — SIGNIFICANT CHANGE UP
HCT VFR BLD CALC: 31.7 % — LOW (ref 39–50)
HGB BLD-MCNC: 10.2 G/DL — LOW (ref 13–17)
MCHC RBC-ENTMCNC: 30.5 PG — SIGNIFICANT CHANGE UP (ref 27–34)
MCHC RBC-ENTMCNC: 32.2 GM/DL — SIGNIFICANT CHANGE UP (ref 32–36)
MCV RBC AUTO: 94.9 FL — SIGNIFICANT CHANGE UP (ref 80–100)
NRBC # BLD: 0 /100 WBCS — SIGNIFICANT CHANGE UP (ref 0–0)
PLATELET # BLD AUTO: 106 K/UL — LOW (ref 150–400)
RBC # BLD: 3.34 M/UL — LOW (ref 4.2–5.8)
RBC # FLD: 16.8 % — HIGH (ref 10.3–14.5)
RH IG SCN BLD-IMP: POSITIVE — SIGNIFICANT CHANGE UP
WBC # BLD: 9.88 K/UL — SIGNIFICANT CHANGE UP (ref 3.8–10.5)
WBC # FLD AUTO: 9.88 K/UL — SIGNIFICANT CHANGE UP (ref 3.8–10.5)

## 2022-03-25 PROCEDURE — 36430 TRANSFUSION BLD/BLD COMPNT: CPT

## 2022-03-25 PROCEDURE — 97530 THERAPEUTIC ACTIVITIES: CPT

## 2022-03-25 PROCEDURE — 97166 OT EVAL MOD COMPLEX 45 MIN: CPT

## 2022-03-25 PROCEDURE — 84132 ASSAY OF SERUM POTASSIUM: CPT

## 2022-03-25 PROCEDURE — C9803: CPT

## 2022-03-25 PROCEDURE — 76000 FLUOROSCOPY <1 HR PHYS/QHP: CPT

## 2022-03-25 PROCEDURE — 85027 COMPLETE CBC AUTOMATED: CPT

## 2022-03-25 PROCEDURE — 82962 GLUCOSE BLOOD TEST: CPT

## 2022-03-25 PROCEDURE — C1776: CPT

## 2022-03-25 PROCEDURE — P9016: CPT

## 2022-03-25 PROCEDURE — 72170 X-RAY EXAM OF PELVIS: CPT

## 2022-03-25 PROCEDURE — 86850 RBC ANTIBODY SCREEN: CPT

## 2022-03-25 PROCEDURE — U0005: CPT

## 2022-03-25 PROCEDURE — 86901 BLOOD TYPING SEROLOGIC RH(D): CPT

## 2022-03-25 PROCEDURE — 97161 PT EVAL LOW COMPLEX 20 MIN: CPT

## 2022-03-25 PROCEDURE — 86923 COMPATIBILITY TEST ELECTRIC: CPT

## 2022-03-25 PROCEDURE — 94660 CPAP INITIATION&MGMT: CPT

## 2022-03-25 PROCEDURE — 97110 THERAPEUTIC EXERCISES: CPT

## 2022-03-25 PROCEDURE — 97116 GAIT TRAINING THERAPY: CPT

## 2022-03-25 PROCEDURE — 86900 BLOOD TYPING SEROLOGIC ABO: CPT

## 2022-03-25 PROCEDURE — U0003: CPT

## 2022-03-25 PROCEDURE — 80048 BASIC METABOLIC PNL TOTAL CA: CPT

## 2022-03-25 PROCEDURE — 36415 COLL VENOUS BLD VENIPUNCTURE: CPT

## 2022-03-25 RX ORDER — OXYCODONE HYDROCHLORIDE 5 MG/1
1 TABLET ORAL
Qty: 42 | Refills: 0
Start: 2022-03-25 | End: 2022-03-31

## 2022-03-25 RX ORDER — TRAMADOL HYDROCHLORIDE 50 MG/1
1 TABLET ORAL
Qty: 28 | Refills: 0
Start: 2022-03-25 | End: 2022-03-31

## 2022-03-25 RX ADMIN — Medication 10 MILLIEQUIVALENT(S): at 18:42

## 2022-03-25 RX ADMIN — OXYCODONE HYDROCHLORIDE 5 MILLIGRAM(S): 5 TABLET ORAL at 10:22

## 2022-03-25 RX ADMIN — Medication 1 TABLET(S): at 11:38

## 2022-03-25 RX ADMIN — FINASTERIDE 5 MILLIGRAM(S): 5 TABLET, FILM COATED ORAL at 11:38

## 2022-03-25 RX ADMIN — OXYCODONE HYDROCHLORIDE 10 MILLIGRAM(S): 5 TABLET ORAL at 15:28

## 2022-03-25 RX ADMIN — OXYCODONE HYDROCHLORIDE 5 MILLIGRAM(S): 5 TABLET ORAL at 09:43

## 2022-03-25 RX ADMIN — Medication 100 MILLIGRAM(S): at 11:38

## 2022-03-25 RX ADMIN — Medication 1 MILLIGRAM(S): at 11:38

## 2022-03-25 RX ADMIN — OXYCODONE HYDROCHLORIDE 10 MILLIGRAM(S): 5 TABLET ORAL at 16:12

## 2022-03-25 RX ADMIN — Medication 975 MILLIGRAM(S): at 14:29

## 2022-03-25 RX ADMIN — APIXABAN 5 MILLIGRAM(S): 2.5 TABLET, FILM COATED ORAL at 05:08

## 2022-03-25 RX ADMIN — PANTOPRAZOLE SODIUM 40 MILLIGRAM(S): 20 TABLET, DELAYED RELEASE ORAL at 05:08

## 2022-03-25 RX ADMIN — Medication 10 MILLIEQUIVALENT(S): at 05:08

## 2022-03-25 RX ADMIN — CARVEDILOL PHOSPHATE 25 MILLIGRAM(S): 80 CAPSULE, EXTENDED RELEASE ORAL at 05:08

## 2022-03-25 RX ADMIN — Medication 975 MILLIGRAM(S): at 05:09

## 2022-03-25 RX ADMIN — BUMETANIDE 2 MILLIGRAM(S): 0.25 INJECTION INTRAMUSCULAR; INTRAVENOUS at 05:08

## 2022-03-25 RX ADMIN — Medication 325 MILLIGRAM(S): at 11:38

## 2022-03-25 RX ADMIN — Medication 975 MILLIGRAM(S): at 14:12

## 2022-03-25 RX ADMIN — Medication 1000 UNIT(S): at 11:38

## 2022-03-25 RX ADMIN — APIXABAN 5 MILLIGRAM(S): 2.5 TABLET, FILM COATED ORAL at 18:42

## 2022-03-25 NOTE — DISCHARGE NOTE NURSING/CASE MANAGEMENT/SOCIAL WORK - NSDCPEFALRISK_GEN_ALL_CORE
For information on Fall & Injury Prevention, visit: [email or web address] OR  [email or web address] OR  https://www.cdc.gov/steadi/patient.html

## 2022-03-25 NOTE — DISCHARGE NOTE NURSING/CASE MANAGEMENT/SOCIAL WORK - PATIENT PORTAL LINK FT
You can access the FollowMyHealth Patient Portal offered by [facility] by registering at the following website: [email or web address]. By joining "[employer]"’s FollowMyHealth portal, you will also be able to view your health information using other applications (apps) compatible with our system.

## 2022-03-25 NOTE — DISCHARGE NOTE NURSING/CASE MANAGEMENT/SOCIAL WORK - NSDCVIVACCINE_GEN_ALL_CORE_FT
Tdap; 26-Feb-2020 00:01; Santos Trejo (RN); [employer]; [id number] (Exp. Date: 17-Sep-2021); IntraMuscular; Deltoid Right.; 0.5 milliLiter(s); VIS (VIS Published: 09-May-2013, VIS Presented: 26-Feb-2020);

## 2022-03-25 NOTE — PROGRESS NOTE ADULT - ASSESSMENT
A/P: 82M s/p L THR    Neuro: Pain control  Resp: IS  GI: Regular diet, bowel reg  MSK: WBAT, PT/OT  Heme: DVT PPX: Eliquis  NO Nsaids  Monitor BP, IVF  Dispo: Home  
A/P: 82M s/p L THR    Neuro: Pain control  Resp: IS  GI: Regular diet, bowel reg  MSK: WBAT, PT/OT  Heme: DVT PPX: Eliquis  NO Nsaids  
A/P: 82M s/p L THR POD 3    Neuro: Pain control  Resp: IS  GI: Regular diet, bowel reg  MSK: WBAT, PT/OT  Heme: DVT PPX: Eliquis  NO Nsaids  Monitor BP, IVF  Wound care rec appreciated, topical therapy for sacral/buttocks wounds  Dispo: Home

## 2022-03-25 NOTE — PROGRESS NOTE ADULT - SUBJECTIVE AND OBJECTIVE BOX
Ortho Progress Note    S: Patient seen and examined. No acute events overnight. Pain well controlled with current regimen. Denies lightheadedness/dizziness, CP/SOB. Tolerating diet.       O:  Physical Exam:  Gen: Laying in bed, NAD, alert and oriented.   Resp: Unlabored breathing  LLExt: Dressing c/d/i  EHL/FHL/TA/Sol intact          + SILT DP/SP/VIEYRA/Sa/T          +DP, extremity WWP    Vital Signs Last 24 Hrs  T(C): 36.5 (23 Mar 2022 05:19), Max: 37.1 (23 Mar 2022 00:20)  T(F): 97.7 (23 Mar 2022 05:19), Max: 98.7 (23 Mar 2022 00:20)  HR: 69 (23 Mar 2022 05:19) (58 - 89)  BP: 98/62 (23 Mar 2022 05:19) (82/50 - 118/72)  BP(mean): 83 (22 Mar 2022 18:00) (62 - 83)  RR: 18 (23 Mar 2022 05:19) (14 - 18)  SpO2: 100% (23 Mar 2022 05:19) (95% - 100%)        03-22    x   |  x   |  x   ----------------------------<  x   3.6   |  x   |  x                 
Ortho Progress Note    S: Patient seen and examined. No acute events overnight. Pain well controlled with current regimen. Denies lightheadedness/dizziness, CP/SOB. Tolerating diet.    O:  Physical Exam:  Gen: Laying in bed, NAD, alert and oriented.   Resp: Unlabored breathing  LLExt: Dressing c/d/i  EHL/FHL/TA/Sol intact          + SILT DP/SP/VIEYRA/Sa/T          +DP, extremity WWP    Vital Signs Last 24 Hrs  T(C): 36.4 (25 Mar 2022 04:28), Max: 36.8 (24 Mar 2022 19:52)  T(F): 97.6 (25 Mar 2022 04:28), Max: 98.3 (24 Mar 2022 19:52)  HR: 77 (25 Mar 2022 04:28) (64 - 80)  BP: 115/71 (25 Mar 2022 04:28) (90/50 - 129/77)  BP(mean): --  RR: 18 (25 Mar 2022 04:28) (18 - 20)  SpO2: 100% (25 Mar 2022 04:28) (96% - 100%)                
Ortho Progress Note    S: Patient seen and examined. No acute events overnight. Pain well controlled with current regimen. Denies lightheadedness/dizziness, CP/SOB. Tolerating diet. Had orthostatic hypotension to SBP 80s yesterday while working with PT      O:  Physical Exam:  Gen: Laying in bed, NAD, alert and oriented.   Resp: Unlabored breathing  LLExt: Dressing c/d/i  EHL/FHL/TA/Sol intact          + SILT DP/SP/VIEYRA/Sa/T          +DP, extremity WWP    Vital Signs Last 24 Hrs  T(C): 36.3 (24 Mar 2022 05:08), Max: 36.8 (23 Mar 2022 22:50)  T(F): 97.4 (24 Mar 2022 05:08), Max: 98.2 (23 Mar 2022 22:50)  HR: 76 (24 Mar 2022 05:08) (62 - 76)  BP: 104/62 (24 Mar 2022 05:08) (95/58 - 119/66)  BP(mean): --  RR: 18 (24 Mar 2022 05:08) (18 - 18)  SpO2: 96% (24 Mar 2022 05:08) (96% - 100%)                          10.1   9.26  )-----------( 120      ( 23 Mar 2022 07:13 )             31.3

## 2022-03-29 ENCOUNTER — NON-APPOINTMENT (OUTPATIENT)
Age: 82
End: 2022-03-29

## 2022-04-01 ENCOUNTER — RX RENEWAL (OUTPATIENT)
Age: 82
End: 2022-04-01

## 2022-04-08 ENCOUNTER — APPOINTMENT (OUTPATIENT)
Dept: ORTHOPEDIC SURGERY | Facility: CLINIC | Age: 82
End: 2022-04-08
Payer: MEDICARE

## 2022-04-08 DIAGNOSIS — Z96.641 PRESENCE OF RIGHT ARTIFICIAL HIP JOINT: ICD-10-CM

## 2022-04-08 PROBLEM — Z96.642 STATUS POST HIP REPLACEMENT, LEFT: Status: ACTIVE | Noted: 2022-04-08

## 2022-04-08 PROCEDURE — 99024 POSTOP FOLLOW-UP VISIT: CPT

## 2022-04-08 PROCEDURE — 73502 X-RAY EXAM HIP UNI 2-3 VIEWS: CPT | Mod: LT

## 2022-04-10 PROBLEM — Z96.641 S/P HIP REPLACEMENT, RIGHT: Status: ACTIVE | Noted: 2022-04-10

## 2022-04-25 NOTE — HISTORY OF PRESENT ILLNESS
[Clean/Dry/Intact] : clean, dry and intact [Neuro Intact] : an unremarkable neurological exam [Vascular Intact] : ~T peripheral vascular exam normal [Negative Paul's] : maneuvers demonstrated a negative Paul's sign [Doing Well] : is doing well [No Sign of Infection] : is showing no signs of infection [Adequate Pain Control] : has adequate pain control [Erythema] : not erythematous [Discharge] : absent of discharge [Swelling] : not swollen [Dehiscence] : not dehisced [de-identified] : s/p left hip replacement. 03/22/22.  [de-identified] : NICOLLE COBB is a 82 year old male who presents s/p left hip replacement. 03/22/22. Patient presents in a wheelchair. Patient has been using a walker. He has completed at home physical therapy. Patient denies any wound complications of fever or chills.  [de-identified] : X-rays of the left hip 2 views obtained today 04/08/2022 shows total hip replacement components in good alignment. [de-identified] : Wound care instruction reviewed.\par I recommended a course of physical therapy for the right hip to improve gait and strength. A script was provided today.   \par He may transition into a cane.\par FU in 3 months

## 2022-04-25 NOTE — ADDENDUM
[FreeTextEntry1] : I, Vj Corado, acted solely as a scribe for Dr. Costa Foster MD on this date 04/08/2022  .\par  \par All medical record entries made by the Scribe were at my, Dr. Costa Foster MD , direction and personally dictated by me on 04/08/2022 . I have reviewed the chart and agree that the record accurately reflects my personal performance of the history, physical exam, assessment and plan. I have also personally directed, reviewed, and agreed with the chart.

## 2022-04-28 ENCOUNTER — APPOINTMENT (OUTPATIENT)
Dept: INTERNAL MEDICINE | Facility: CLINIC | Age: 82
End: 2022-04-28
Payer: MEDICARE

## 2022-04-28 VITALS
HEART RATE: 92 BPM | WEIGHT: 155 LBS | DIASTOLIC BLOOD PRESSURE: 54 MMHG | SYSTOLIC BLOOD PRESSURE: 131 MMHG | HEIGHT: 66 IN | TEMPERATURE: 98 F | BODY MASS INDEX: 24.91 KG/M2 | OXYGEN SATURATION: 97 %

## 2022-04-28 PROCEDURE — 36415 COLL VENOUS BLD VENIPUNCTURE: CPT

## 2022-04-28 PROCEDURE — 99214 OFFICE O/P EST MOD 30 MIN: CPT | Mod: 25

## 2022-04-29 NOTE — ADDENDUM
[FreeTextEntry1] : I, Olamide Banks, acted as a scribe on behalf of Dr. Catalino Lara MD, on 04/29/2022. \par \par All medical entries made by the scribe were at my, Dr. Catalino Lara MD, direction and personally dictated by me on 04/29/2022. I have reviewed the chart and agree that the record accurately reflects my personal performance of the history, physical exam, assessment and plan. I have also personally directed, reviewed, and agreed with the chart.

## 2022-04-29 NOTE — PHYSICAL EXAM
[Normal] : normal gait, coordination grossly intact, no focal deficits and deep tendon reflexes were 2+ and symmetric [de-identified] : Dry skin

## 2022-04-29 NOTE — ASSESSMENT
[FreeTextEntry1] : -Check CBC and electrolytes today.\par -Check Blood count, currently asymptomatic and blood pressure is stable.\par -Wound scar was cleaned dry and intact.

## 2022-04-29 NOTE — HISTORY OF PRESENT ILLNESS
[FreeTextEntry1] : Patient presents for follow-up. [de-identified] : Patient reports he had an orthopedic hip replacement surgery. Also had a low blood count and was given transfusion.\par Currently feels well and Blood pressure is now stable.\par Denies any lightheadedness or dizziness\par Denies any fevers or chills.

## 2022-05-04 LAB
25(OH)D3 SERPL-MCNC: 51.8 NG/ML
ALBUMIN SERPL ELPH-MCNC: 4.5 G/DL
ALP BLD-CCNC: 92 U/L
ALT SERPL-CCNC: 19 U/L
ANION GAP SERPL CALC-SCNC: 13 MMOL/L
APPEARANCE: CLEAR
APPEARANCE: CLEAR
AST SERPL-CCNC: 14 U/L
BACTERIA: NEGATIVE
BASOPHILS # BLD AUTO: 0.06 K/UL
BASOPHILS NFR BLD AUTO: 0.7 %
BILIRUB SERPL-MCNC: 0.5 MG/DL
BILIRUBIN URINE: NEGATIVE
BILIRUBIN URINE: NEGATIVE
BLOOD URINE: NEGATIVE
BLOOD URINE: NEGATIVE
BUN SERPL-MCNC: 66 MG/DL
CALCIUM SERPL-MCNC: 10 MG/DL
CHLORIDE SERPL-SCNC: 95 MMOL/L
CHOLEST SERPL-MCNC: 102 MG/DL
CO2 SERPL-SCNC: 36 MMOL/L
COLOR: COLORLESS
COLOR: COLORLESS
CREAT SERPL-MCNC: 1.85 MG/DL
EGFR: 36 ML/MIN/1.73M2
EOSINOPHIL # BLD AUTO: 0.19 K/UL
EOSINOPHIL NFR BLD AUTO: 2.1 %
FERRITIN SERPL-MCNC: 223 NG/ML
GLUCOSE QUALITATIVE U: NEGATIVE
GLUCOSE QUALITATIVE U: NEGATIVE
GLUCOSE SERPL-MCNC: 139 MG/DL
HCT VFR BLD CALC: 34.2 %
HDLC SERPL-MCNC: 39 MG/DL
HGB BLD-MCNC: 10.2 G/DL
HYALINE CASTS: 2 /LPF
IMM GRANULOCYTES NFR BLD AUTO: 0.7 %
IRON SATN MFR SERPL: 15 %
IRON SERPL-MCNC: 45 UG/DL
KETONES URINE: NEGATIVE
KETONES URINE: NEGATIVE
LDLC SERPL CALC-MCNC: 38 MG/DL
LEUKOCYTE ESTERASE URINE: NEGATIVE
LEUKOCYTE ESTERASE URINE: NEGATIVE
LYMPHOCYTES # BLD AUTO: 1.41 K/UL
LYMPHOCYTES NFR BLD AUTO: 15.6 %
MAN DIFF?: NORMAL
MCHC RBC-ENTMCNC: 29.8 GM/DL
MCHC RBC-ENTMCNC: 30 PG
MCV RBC AUTO: 100.6 FL
MICROSCOPIC-UA: NORMAL
MONOCYTES # BLD AUTO: 0.83 K/UL
MONOCYTES NFR BLD AUTO: 9.2 %
NEUTROPHILS # BLD AUTO: 6.49 K/UL
NEUTROPHILS NFR BLD AUTO: 71.7 %
NITRITE URINE: NEGATIVE
NITRITE URINE: NEGATIVE
NONHDLC SERPL-MCNC: 63 MG/DL
NT-PROBNP SERPL-MCNC: 3274 PG/ML
PH URINE: 6.5
PH URINE: 6.5
PLATELET # BLD AUTO: 187 K/UL
POTASSIUM SERPL-SCNC: 4 MMOL/L
PROT SERPL-MCNC: 7.1 G/DL
PROTEIN URINE: NEGATIVE
PROTEIN URINE: NEGATIVE
RBC # BLD: 3.4 M/UL
RBC # FLD: 16 %
RED BLOOD CELLS URINE: 2 /HPF
SODIUM SERPL-SCNC: 144 MMOL/L
SPECIFIC GRAVITY URINE: 1.01
SPECIFIC GRAVITY URINE: 1.01
SQUAMOUS EPITHELIAL CELLS: 0 /HPF
TIBC SERPL-MCNC: 307 UG/DL
TRIGL SERPL-MCNC: 122 MG/DL
TSH SERPL-ACNC: 3.36 UIU/ML
UIBC SERPL-MCNC: 261 UG/DL
UROBILINOGEN URINE: NORMAL
UROBILINOGEN URINE: NORMAL
VIT B12 SERPL-MCNC: 512 PG/ML
WBC # FLD AUTO: 9.04 K/UL
WHITE BLOOD CELLS URINE: 1 /HPF

## 2022-05-16 ENCOUNTER — RX RENEWAL (OUTPATIENT)
Age: 82
End: 2022-05-16

## 2022-06-13 ENCOUNTER — NON-APPOINTMENT (OUTPATIENT)
Age: 82
End: 2022-06-13

## 2022-06-13 ENCOUNTER — APPOINTMENT (OUTPATIENT)
Dept: CARDIOLOGY | Facility: CLINIC | Age: 82
End: 2022-06-13
Payer: MEDICARE

## 2022-06-13 VITALS
HEART RATE: 86 BPM | BODY MASS INDEX: 27.8 KG/M2 | OXYGEN SATURATION: 93 % | SYSTOLIC BLOOD PRESSURE: 137 MMHG | WEIGHT: 173 LBS | DIASTOLIC BLOOD PRESSURE: 80 MMHG | TEMPERATURE: 98.1 F | HEIGHT: 66 IN | RESPIRATION RATE: 12 BRPM

## 2022-06-13 PROCEDURE — 93000 ELECTROCARDIOGRAM COMPLETE: CPT

## 2022-06-13 PROCEDURE — 99214 OFFICE O/P EST MOD 30 MIN: CPT

## 2022-06-13 RX ORDER — OXYCODONE 5 MG/1
5 TABLET ORAL
Qty: 42 | Refills: 0 | Status: ACTIVE | COMMUNITY
Start: 2022-03-25

## 2022-06-13 RX ORDER — TRAMADOL HYDROCHLORIDE 50 MG/1
50 TABLET, COATED ORAL
Qty: 28 | Refills: 0 | Status: ACTIVE | COMMUNITY
Start: 2022-03-25

## 2022-06-13 NOTE — HISTORY OF PRESENT ILLNESS
[FreeTextEntry1] : Tim had Lt hip replacement 3/25/22. BP was low and Hb low. Got transfused and BP better but discharged on half his bumex at 2mg daily. Steadily gaining weight and legs very swollen. Otherwise feels ok. Wants to get his veins done.

## 2022-06-13 NOTE — DISCUSSION/SUMMARY
[FreeTextEntry1] : The patient is an 82-year-old Amharic gentleman CVA, CKD, BPH, afib, PVD, HFrEF,venous stasis who is euvolemic s/p lt THR with 18 pound weight gain\par #1 CV- LV function now 55%,  bilateral LE edema severe\par #2 HFrEF- global LV dysfunction resolved,go back to bumex 4mg daily, K20mEq,and metolazone 5mg increase M,W,F with 20 extra K, Labs today, RTO 3 weeks.\par #3 Htn- continue coreg and losartan\par #4 Afib- continue eliquis 5mg bid\par #5 CKD- stable, f/u Dr. Gonzalez\par #6 BPH- continue tamsulosin\par #7 PVD- negative arterial and venous doppler\par #8 Ortho- s/p left THR

## 2022-06-13 NOTE — REVIEW OF SYSTEMS
[Negative] : Heme/Lymph [Weight Gain (___ Lbs)] : [unfilled] ~Ulb weight gain [SOB] : no shortness of breath [Dyspnea on exertion] : not dyspnea during exertion [Chest Discomfort] : no chest discomfort [Leg Claudication] : no intermittent leg claudication [Syncope] : no syncope

## 2022-06-14 LAB
ANION GAP SERPL CALC-SCNC: 16 MMOL/L
BUN SERPL-MCNC: 61 MG/DL
CALCIUM SERPL-MCNC: 9.8 MG/DL
CHLORIDE SERPL-SCNC: 97 MMOL/L
CO2 SERPL-SCNC: 33 MMOL/L
CREAT SERPL-MCNC: 1.91 MG/DL
EGFR: 35 ML/MIN/1.73M2
GLUCOSE SERPL-MCNC: 166 MG/DL
NT-PROBNP SERPL-MCNC: 3847 PG/ML
POTASSIUM SERPL-SCNC: 3.7 MMOL/L
SODIUM SERPL-SCNC: 146 MMOL/L

## 2022-06-30 ENCOUNTER — RX RENEWAL (OUTPATIENT)
Age: 82
End: 2022-06-30

## 2022-07-08 ENCOUNTER — APPOINTMENT (OUTPATIENT)
Dept: ORTHOPEDIC SURGERY | Facility: CLINIC | Age: 82
End: 2022-07-08

## 2022-07-08 VITALS
SYSTOLIC BLOOD PRESSURE: 122 MMHG | BODY MASS INDEX: 27.8 KG/M2 | HEIGHT: 66 IN | WEIGHT: 173 LBS | HEART RATE: 75 BPM | DIASTOLIC BLOOD PRESSURE: 69 MMHG

## 2022-07-08 DIAGNOSIS — Z96.642 PRESENCE OF LEFT ARTIFICIAL HIP JOINT: ICD-10-CM

## 2022-07-08 PROCEDURE — 99214 OFFICE O/P EST MOD 30 MIN: CPT

## 2022-07-08 PROCEDURE — 73502 X-RAY EXAM HIP UNI 2-3 VIEWS: CPT

## 2022-07-18 ENCOUNTER — APPOINTMENT (OUTPATIENT)
Dept: CARDIOLOGY | Facility: CLINIC | Age: 82
End: 2022-07-18

## 2022-07-18 ENCOUNTER — NON-APPOINTMENT (OUTPATIENT)
Age: 82
End: 2022-07-18

## 2022-07-18 VITALS
SYSTOLIC BLOOD PRESSURE: 107 MMHG | HEART RATE: 80 BPM | DIASTOLIC BLOOD PRESSURE: 67 MMHG | TEMPERATURE: 96.9 F | WEIGHT: 156 LBS | BODY MASS INDEX: 25.07 KG/M2 | OXYGEN SATURATION: 98 % | RESPIRATION RATE: 12 BRPM | HEIGHT: 66 IN

## 2022-07-18 DIAGNOSIS — L03.115 CELLULITIS OF RIGHT LOWER LIMB: ICD-10-CM

## 2022-07-18 PROCEDURE — 99214 OFFICE O/P EST MOD 30 MIN: CPT

## 2022-07-18 PROCEDURE — 93000 ELECTROCARDIOGRAM COMPLETE: CPT

## 2022-07-18 NOTE — HISTORY OF PRESENT ILLNESS
[FreeTextEntry1] : Themis feels much better and legs look great with increase diuretics. No lightheadedness or dizziness. Wants to have a vein procedure.

## 2022-07-18 NOTE — REVIEW OF SYSTEMS
[Negative] : Heme/Lymph [Weight Gain (___ Lbs)] : no recent weight gain [Weight Loss (___ Lbs)] : [unfilled] ~Ulb weight loss [SOB] : no shortness of breath [Dyspnea on exertion] : not dyspnea during exertion [Chest Discomfort] : no chest discomfort [Leg Claudication] : no intermittent leg claudication [Syncope] : no syncope

## 2022-07-18 NOTE — DISCUSSION/SUMMARY
[EKG obtained to assist in diagnosis and management of assessed problem(s)] : EKG obtained to assist in diagnosis and management of assessed problem(s) [FreeTextEntry1] : The patient is an 82-year-old [place] gentleman CVA, CKD, BPH, afib, PVD, HFrEF,venous stasis who is euvolemic s/p lt THR who is more euvolemic today\par #1 CV- LV function now 55%,  bilateral LE edema severe\par #2 HFrEF- global LV dysfunction resolved,c/w  bumex 4mg daily, K20mEq,and metolazone 5mg increase M,W,F with 20 extra K, Labs today\par #3 Htn- continue coreg and losartan\par #4 Afib- continue eliquis 5mg bid\par #5 CKD- stable, f/u Dr. Gonzalez\par #6 BPH- continue tamsulosin\par #7 PVD- negative arterial and venous doppler\par #8 Ortho- s/p left THR

## 2022-07-20 LAB
ANION GAP SERPL CALC-SCNC: 23 MMOL/L
BUN SERPL-MCNC: 119 MG/DL
CALCIUM SERPL-MCNC: 10.4 MG/DL
CHLORIDE SERPL-SCNC: 81 MMOL/L
CO2 SERPL-SCNC: 31 MMOL/L
CREAT SERPL-MCNC: 3.09 MG/DL
EGFR: 20 ML/MIN/1.73M2
GLUCOSE SERPL-MCNC: 154 MG/DL
NT-PROBNP SERPL-MCNC: 2333 PG/ML
POTASSIUM SERPL-SCNC: 3.8 MMOL/L
SODIUM SERPL-SCNC: 135 MMOL/L

## 2022-07-26 NOTE — PRE-ANESTHESIA EVALUATION ADULT - LAST CARDIAC ANGIOGRAM/IMAGING
none Thalidomide Counseling: I discussed with the patient the risks of thalidomide including but not limited to birth defects, anxiety, weakness, chest pain, dizziness, cough and severe allergy.

## 2022-08-01 ENCOUNTER — RX RENEWAL (OUTPATIENT)
Age: 82
End: 2022-08-01

## 2022-08-17 ENCOUNTER — RX RENEWAL (OUTPATIENT)
Age: 82
End: 2022-08-17

## 2022-08-22 ENCOUNTER — APPOINTMENT (OUTPATIENT)
Dept: INTERNAL MEDICINE | Facility: CLINIC | Age: 82
End: 2022-08-22

## 2022-08-22 ENCOUNTER — RX RENEWAL (OUTPATIENT)
Age: 82
End: 2022-08-22

## 2022-08-22 VITALS
OXYGEN SATURATION: 96 % | DIASTOLIC BLOOD PRESSURE: 73 MMHG | TEMPERATURE: 97.1 F | SYSTOLIC BLOOD PRESSURE: 128 MMHG | BODY MASS INDEX: 24.91 KG/M2 | WEIGHT: 155 LBS | HEIGHT: 66 IN | HEART RATE: 61 BPM

## 2022-08-22 DIAGNOSIS — M25.675 STIFFNESS OF LEFT FOOT, NOT ELSEWHERE CLASSIFIED: ICD-10-CM
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
## 2022-08-22 PROCEDURE — 99214 OFFICE O/P EST MOD 30 MIN: CPT | Mod: 25

## 2022-08-22 PROCEDURE — 36415 COLL VENOUS BLD VENIPUNCTURE: CPT

## 2022-08-22 NOTE — ASSESSMENT
[FreeTextEntry1] : -Previous labs reviewed.\par -Pt appears clinically euvolemic. \par -Continue current management for A-fib Currently rate controlled\par -Check electrolytes due to increase creatinine likely secondary to diuretic use. If persistently elevated advised to f/u with nephrology.\par -Physical Therapy Referral given for Gait training and strengthening exercises.\par -Discussed fall risk precautions with Pt. Advised to use assisted device. -Spine XR ordered.

## 2022-08-22 NOTE — HISTORY OF PRESENT ILLNESS
[FreeTextEntry1] : Patient presents for follow-up.  [de-identified] : Patient is doing well overall. LE edema has improved and has been undergoing Physical Therapy.\par Denies any SOB with lying down.\par Wishes to go for Procedure for Varicose Vein.\par \par Pt reports of a recent fall. States he lost his balance and fell on his back. Originally had pain, however improved.\par Denies any LE weakness.\par States he is able to make urine.

## 2022-08-22 NOTE — ADDENDUM
[FreeTextEntry1] : I, Olamide Banks, acted as a scribe on behalf of Dr. Catalino Lara MD, on 08/22/2022. \par \par All medical entries made by the scribe were at my, Dr. Catalino Lara MD, direction and personally dictated by me on 08/22/2022. I have reviewed the chart and agree that the record accurately reflects my personal performance of the history, physical exam, assessment and plan. I have also personally directed, reviewed, and agreed with the chart.

## 2022-08-22 NOTE — PHYSICAL EXAM
[Normal] : soft, non-tender, non-distended, no masses palpated, no HSM and normal bowel sounds [Irregularly Irregular] : irregularly irregular [de-identified] : Venous stasis changes of LE [de-identified] : Tenderness of the Lower thoracic and lumbosacral spine [de-identified] : Bruising of the lumbosacral spine.

## 2022-08-25 LAB
25(OH)D3 SERPL-MCNC: 49.1 NG/ML
ALBUMIN SERPL ELPH-MCNC: 4.5 G/DL
ALP BLD-CCNC: 64 U/L
ALT SERPL-CCNC: 19 U/L
ANION GAP SERPL CALC-SCNC: 14 MMOL/L
APPEARANCE: CLEAR
AST SERPL-CCNC: 17 U/L
BACTERIA: NEGATIVE
BASOPHILS # BLD AUTO: 0.05 K/UL
BASOPHILS NFR BLD AUTO: 0.7 %
BILIRUB SERPL-MCNC: 0.6 MG/DL
BILIRUBIN URINE: NEGATIVE
BLOOD URINE: NEGATIVE
BUN SERPL-MCNC: 85 MG/DL
CALCIUM SERPL-MCNC: 9.8 MG/DL
CALCIUM SERPL-MCNC: 9.8 MG/DL
CHLORIDE SERPL-SCNC: 94 MMOL/L
CHOLEST SERPL-MCNC: 108 MG/DL
CO2 SERPL-SCNC: 33 MMOL/L
COLOR: NORMAL
CREAT SERPL-MCNC: 2.78 MG/DL
EGFR: 22 ML/MIN/1.73M2
EOSINOPHIL # BLD AUTO: 0.17 K/UL
EOSINOPHIL NFR BLD AUTO: 2.3 %
ESTIMATED AVERAGE GLUCOSE: 163 MG/DL
GLUCOSE QUALITATIVE U: NEGATIVE
GLUCOSE SERPL-MCNC: 201 MG/DL
HBA1C MFR BLD HPLC: 7.3 %
HCT VFR BLD CALC: 38.8 %
HDLC SERPL-MCNC: 35 MG/DL
HGB BLD-MCNC: 12 G/DL
HYALINE CASTS: 0 /LPF
IMM GRANULOCYTES NFR BLD AUTO: 0.3 %
KETONES URINE: NEGATIVE
LDLC SERPL CALC-MCNC: 47 MG/DL
LEUKOCYTE ESTERASE URINE: NEGATIVE
LYMPHOCYTES # BLD AUTO: 1.24 K/UL
LYMPHOCYTES NFR BLD AUTO: 16.9 %
MAGNESIUM SERPL-MCNC: 2.3 MG/DL
MAN DIFF?: NORMAL
MCHC RBC-ENTMCNC: 28.3 PG
MCHC RBC-ENTMCNC: 30.9 GM/DL
MCV RBC AUTO: 91.5 FL
MICROSCOPIC-UA: NORMAL
MONOCYTES # BLD AUTO: 0.41 K/UL
MONOCYTES NFR BLD AUTO: 5.6 %
NEUTROPHILS # BLD AUTO: 5.45 K/UL
NEUTROPHILS NFR BLD AUTO: 74.2 %
NITRITE URINE: NEGATIVE
NONHDLC SERPL-MCNC: 73 MG/DL
PARATHYROID HORMONE INTACT: 190 PG/ML
PH URINE: 7
PHOSPHATE SERPL-MCNC: 4 MG/DL
PLATELET # BLD AUTO: 152 K/UL
POTASSIUM SERPL-SCNC: 4.5 MMOL/L
PROT SERPL-MCNC: 7.3 G/DL
PROTEIN URINE: NEGATIVE
RBC # BLD: 4.24 M/UL
RBC # FLD: 18 %
RED BLOOD CELLS URINE: 1 /HPF
SODIUM SERPL-SCNC: 141 MMOL/L
SPECIFIC GRAVITY URINE: 1.01
SQUAMOUS EPITHELIAL CELLS: 0 /HPF
TRIGL SERPL-MCNC: 132 MG/DL
TSH SERPL-ACNC: 1.96 UIU/ML
UROBILINOGEN URINE: NORMAL
VIT B12 SERPL-MCNC: 568 PG/ML
WBC # FLD AUTO: 7.34 K/UL
WHITE BLOOD CELLS URINE: 0 /HPF

## 2022-09-01 ENCOUNTER — RX RENEWAL (OUTPATIENT)
Age: 82
End: 2022-09-01

## 2022-09-06 ENCOUNTER — RX RENEWAL (OUTPATIENT)
Age: 82
End: 2022-09-06

## 2022-09-19 ENCOUNTER — NON-APPOINTMENT (OUTPATIENT)
Age: 82
End: 2022-09-19

## 2022-09-19 ENCOUNTER — APPOINTMENT (OUTPATIENT)
Dept: CARDIOLOGY | Facility: CLINIC | Age: 82
End: 2022-09-19

## 2022-09-19 VITALS
RESPIRATION RATE: 12 BRPM | SYSTOLIC BLOOD PRESSURE: 97 MMHG | BODY MASS INDEX: 24.59 KG/M2 | OXYGEN SATURATION: 98 % | HEIGHT: 66 IN | DIASTOLIC BLOOD PRESSURE: 70 MMHG | HEART RATE: 90 BPM | TEMPERATURE: 97.3 F | WEIGHT: 153 LBS

## 2022-09-19 PROCEDURE — 99214 OFFICE O/P EST MOD 30 MIN: CPT

## 2022-09-19 PROCEDURE — 93000 ELECTROCARDIOGRAM COMPLETE: CPT

## 2022-09-19 NOTE — HISTORY OF PRESENT ILLNESS
[FreeTextEntry1] : Themis feels good with weight within 1-2 pounds. No lightheadedness or dizziness. Here with daughter.

## 2022-09-19 NOTE — DISCUSSION/SUMMARY
[FreeTextEntry1] : The patient is an 82-year-old Tajik gentleman CVA, CKD, BPH, afib, PVD, HFrEF,venous stasis who is euvolemic s/p lt THR who is  euvolemic today\par #1 CV- LV function now 55%,  bilateral LE edema severe\par #2 HFrEF- global LV dysfunction resolved,c/w  bumex 4mg daily, K20mEq,and metolazone 5mg decrease to every other week with 20 extra K, Labs today\par #3 Htn- continue coreg and losartan\par #4 Afib- continue eliquis 5mg bid\par #5 CKD- stable, f/u Dr. Gonzalez\par #6 BPH- continue tamsulosin\par #7 PVD- negative arterial and venous doppler, f/u vascular, wants to have veins treated\par #8 Ortho- s/p left THR [EKG obtained to assist in diagnosis and management of assessed problem(s)] : EKG obtained to assist in diagnosis and management of assessed problem(s)

## 2022-10-10 ENCOUNTER — RX RENEWAL (OUTPATIENT)
Age: 82
End: 2022-10-10

## 2022-10-14 NOTE — HISTORY OF PRESENT ILLNESS
[FreeTextEntry1] : Tim is feeling well. Here with his son today. Has a nurse doing wound care on pressure ulcer. No chest pain, palpitations, dizziness or change in breathing. Legs look good. No tremors despite decrease in baclofen.  Modern[employer]

## 2022-11-06 ENCOUNTER — RX RENEWAL (OUTPATIENT)
Age: 82
End: 2022-11-06

## 2022-11-07 ENCOUNTER — RX RENEWAL (OUTPATIENT)
Age: 82
End: 2022-11-07

## 2022-11-17 ENCOUNTER — APPOINTMENT (OUTPATIENT)
Dept: CARDIOLOGY | Facility: CLINIC | Age: 82
End: 2022-11-17

## 2022-11-17 ENCOUNTER — NON-APPOINTMENT (OUTPATIENT)
Age: 82
End: 2022-11-17

## 2022-11-17 VITALS
BODY MASS INDEX: 24.75 KG/M2 | DIASTOLIC BLOOD PRESSURE: 74 MMHG | HEIGHT: 66 IN | HEART RATE: 74 BPM | OXYGEN SATURATION: 93 % | TEMPERATURE: 97.7 F | SYSTOLIC BLOOD PRESSURE: 128 MMHG | RESPIRATION RATE: 12 BRPM | WEIGHT: 154 LBS

## 2022-11-17 PROCEDURE — 93000 ELECTROCARDIOGRAM COMPLETE: CPT

## 2022-11-17 PROCEDURE — 99214 OFFICE O/P EST MOD 30 MIN: CPT

## 2022-11-18 LAB
ANION GAP SERPL CALC-SCNC: 14 MMOL/L
BASOPHILS # BLD AUTO: 0.05 K/UL
BASOPHILS NFR BLD AUTO: 0.7 %
BUN SERPL-MCNC: 90 MG/DL
CALCIUM SERPL-MCNC: 10.4 MG/DL
CHLORIDE SERPL-SCNC: 97 MMOL/L
CO2 SERPL-SCNC: 34 MMOL/L
CREAT SERPL-MCNC: 3.11 MG/DL
EGFR: 20 ML/MIN/1.73M2
EOSINOPHIL # BLD AUTO: 0.25 K/UL
EOSINOPHIL NFR BLD AUTO: 3.7 %
GLUCOSE SERPL-MCNC: 95 MG/DL
HCT VFR BLD CALC: 38.6 %
HGB BLD-MCNC: 11.7 G/DL
IMM GRANULOCYTES NFR BLD AUTO: 0.4 %
LYMPHOCYTES # BLD AUTO: 1.26 K/UL
LYMPHOCYTES NFR BLD AUTO: 18.7 %
MAN DIFF?: NORMAL
MCHC RBC-ENTMCNC: 30.3 GM/DL
MCHC RBC-ENTMCNC: 30.6 PG
MCV RBC AUTO: 101 FL
MONOCYTES # BLD AUTO: 0.58 K/UL
MONOCYTES NFR BLD AUTO: 8.6 %
NEUTROPHILS # BLD AUTO: 4.56 K/UL
NEUTROPHILS NFR BLD AUTO: 67.9 %
NT-PROBNP SERPL-MCNC: 3733 PG/ML
PLATELET # BLD AUTO: 132 K/UL
POTASSIUM SERPL-SCNC: 5.4 MMOL/L
RBC # BLD: 3.82 M/UL
RBC # FLD: 15.1 %
SODIUM SERPL-SCNC: 146 MMOL/L
WBC # FLD AUTO: 6.73 K/UL

## 2022-11-18 NOTE — REVIEW OF SYSTEMS
[Weight Loss (___ Lbs)] : [unfilled] ~Ulb weight loss [Negative] : Heme/Lymph [Weight Gain (___ Lbs)] : no recent weight gain [SOB] : no shortness of breath [Dyspnea on exertion] : not dyspnea during exertion [Chest Discomfort] : no chest discomfort [Leg Claudication] : no intermittent leg claudication [Syncope] : no syncope

## 2022-11-18 NOTE — DISCUSSION/SUMMARY
[FreeTextEntry1] : The patient is an 82-year-old [place] gentleman CVA, CKD, BPH, afib, PVD, HFrEF,venous stasis who is euvolemic s/p lt THR who is asymptomatic.\par #1 CV- LV function now 55%,  bilateral LE edema \par #2 HFrEF- global LV dysfunction resolved,c/w  bumex 4mg daily, K20mEq,and metolazone 5mg every other week with 20 extra K, Labs today\par #3 Htn- continue coreg and losartan\par #4 Afib- continue eliquis 5mg bid\par #5 CKD- stable, f/u Dr. Gonzlaez\par #6 BPH- continue tamsulosin\par #7 PVD- negative arterial and venous doppler, f/u vascular\par #8 Ortho- s/p left THR [EKG obtained to assist in diagnosis and management of assessed problem(s)] : EKG obtained to assist in diagnosis and management of assessed problem(s)

## 2022-11-21 ENCOUNTER — RX RENEWAL (OUTPATIENT)
Age: 82
End: 2022-11-21

## 2022-11-21 RX ORDER — CHOLECALCIFEROL (VITAMIN D3) 50 MCG
25 MCG TABLET ORAL DAILY
Qty: 90 | Refills: 0 | Status: ACTIVE | COMMUNITY
Start: 2021-03-18 | End: 1900-01-01

## 2022-12-05 ENCOUNTER — APPOINTMENT (OUTPATIENT)
Dept: NEPHROLOGY | Facility: CLINIC | Age: 82
End: 2022-12-05

## 2022-12-05 ENCOUNTER — RX RENEWAL (OUTPATIENT)
Age: 82
End: 2022-12-05

## 2022-12-05 VITALS
OXYGEN SATURATION: 95 % | TEMPERATURE: 97.6 F | HEIGHT: 66 IN | HEART RATE: 60 BPM | DIASTOLIC BLOOD PRESSURE: 67 MMHG | BODY MASS INDEX: 26.52 KG/M2 | SYSTOLIC BLOOD PRESSURE: 120 MMHG | WEIGHT: 165 LBS

## 2022-12-05 DIAGNOSIS — E87.5 HYPERKALEMIA: ICD-10-CM

## 2022-12-05 PROCEDURE — 99213 OFFICE O/P EST LOW 20 MIN: CPT

## 2022-12-05 RX ORDER — COVID-19 MOLECULAR TEST ASSAY
KIT MISCELLANEOUS
Qty: 8 | Refills: 0 | Status: ACTIVE | COMMUNITY
Start: 2022-08-02

## 2022-12-05 NOTE — REASON FOR VISIT
[Follow-Up] : a follow-up visit [FreeTextEntry1] : Stage IV CKD, chronic CHF and atrial fibrillation

## 2022-12-05 NOTE — HISTORY OF PRESENT ILLNESS
[FreeTextEntry1] : The patient underwent a left THR in April 2022 with Dr. Foster. He did very well. He had rehab and he is walking w/ only a cane.  He was seen in follow up for ischemic cardiomyopathy and atrial fibrillation by Dr. Jacobs on November 17. Labs showed a slight increase in creatinine. K was up to 5.7.  He was euvolemia. The patient states that he feels well. He is watching the Soccer World Cup on TV. Predicts that Mariela will win....\par He has no complaints. \par The son is accompanying the patient and has a list of his meds  He is taking 20 mEq of KCl twice per day. \par The purpose of this visit is to assess the progression of stage IV CKD.

## 2022-12-05 NOTE — REVIEW OF SYSTEMS
[Feeling Tired] : feeling tired [Loss Of Hearing] : hearing loss [As noted in HPI] : as noted in HPI [SOB on Exertion] : shortness of breath during exertion [Nocturia] : nocturia [As Noted in HPI] : as noted in HPI [Negative] : Integumentary

## 2022-12-05 NOTE — PHYSICAL EXAM
[General Appearance - Alert] : alert [General Appearance - In No Acute Distress] : in no acute distress [Jugular Venous Distention Increased] : there was no jugular-venous distention [Auscultation Breath Sounds / Voice Sounds] : lungs were clear to auscultation bilaterally [Systolic grade ___/6] : A grade [unfilled]/6 systolic murmur was heard. [Edema] : there was no peripheral edema [Abdomen Tenderness] : non-tender [] : no hepato-splenomegaly [No CVA Tenderness] : no ~M costovertebral angle tenderness [FreeTextEntry1] : BIlateral venous stasis of the lower extremities [No Focal Deficits] : no focal deficits [Oriented To Time, Place, And Person] : oriented to person, place, and time

## 2022-12-05 NOTE — ASSESSMENT
[FreeTextEntry1] : Overall remarkable improvement in clinical status.  \par 1. CKD stage IV. Euvolemic. No changes in dose of diuretics. \par 2. CHF w/ systolic dysfunction: stable.\par 3 Hyperkalemia: will reduce KCl from 20 mEq twice per day to 10 mEq bid. \par Return in February. Follow up w/ Dr. Jacobs in mid January.

## 2022-12-27 NOTE — PATIENT PROFILE ADULT - HAVE YOU EXPERIENCED VIOLENCE OR A TRAUMATIC EVENT?
Last office visit: 5/18/22    Upcoming scheduled visit: none    Last refill given: 7/19/22      Medication refill request approved per protocol   
no

## 2023-01-12 ENCOUNTER — RX RENEWAL (OUTPATIENT)
Age: 83
End: 2023-01-12

## 2023-01-19 ENCOUNTER — APPOINTMENT (OUTPATIENT)
Dept: CARDIOLOGY | Facility: CLINIC | Age: 83
End: 2023-01-19
Payer: MEDICARE

## 2023-01-19 ENCOUNTER — NON-APPOINTMENT (OUTPATIENT)
Age: 83
End: 2023-01-19

## 2023-01-19 VITALS
BODY MASS INDEX: 26.36 KG/M2 | DIASTOLIC BLOOD PRESSURE: 59 MMHG | RESPIRATION RATE: 14 BRPM | HEIGHT: 66 IN | WEIGHT: 164 LBS | OXYGEN SATURATION: 94 % | TEMPERATURE: 97.2 F | SYSTOLIC BLOOD PRESSURE: 101 MMHG | HEART RATE: 73 BPM

## 2023-01-19 PROCEDURE — 99214 OFFICE O/P EST MOD 30 MIN: CPT

## 2023-01-19 PROCEDURE — 93000 ELECTROCARDIOGRAM COMPLETE: CPT

## 2023-01-19 NOTE — HISTORY OF PRESENT ILLNESS
[FreeTextEntry1] : Tim is feeling well with improvement in his breathing and LE edema. Metolazone was decreased to every other week.

## 2023-01-19 NOTE — DISCUSSION/SUMMARY
[FreeTextEntry1] : The patient is an 82-year-old Spanish gentleman CVA, CKD, BPH, afib, PVD, HFrEF,venous stasis who is euvolemic s/p lt THR who is improving\par #1 CV- LV function now 55%,  bilateral LE edema \par #2 HFrEF- global LV dysfunction resolved,c/w  bumex 2mg twice a day K20mEq,and metolazone 5mg every other week with 20 extra K, Labs today\par #3 Htn- continue coreg and losartan\par #4 Afib- continue eliquis 5mg bid\par #5 CKD- stable, f/u Dr. Gonzalez\par #6 BPH- continue tamsulosin\par #7 PVD- negative arterial and venous doppler, f/u vascular\par #8 Ortho- s/p left THR [EKG obtained to assist in diagnosis and management of assessed problem(s)] : EKG obtained to assist in diagnosis and management of assessed problem(s)

## 2023-01-20 LAB
ANION GAP SERPL CALC-SCNC: 18 MMOL/L
BUN SERPL-MCNC: 87 MG/DL
CALCIUM SERPL-MCNC: 10.3 MG/DL
CHLORIDE SERPL-SCNC: 88 MMOL/L
CO2 SERPL-SCNC: 36 MMOL/L
CREAT SERPL-MCNC: 3.1 MG/DL
EGFR: 20 ML/MIN/1.73M2
GLUCOSE SERPL-MCNC: 118 MG/DL
NT-PROBNP SERPL-MCNC: 4617 PG/ML
POTASSIUM SERPL-SCNC: 4.4 MMOL/L
SODIUM SERPL-SCNC: 142 MMOL/L

## 2023-01-31 ENCOUNTER — RX RENEWAL (OUTPATIENT)
Age: 83
End: 2023-01-31

## 2023-01-31 RX ORDER — MULTIVIT-MIN/FOLIC/VIT K/LYCOP 400-300MCG
25 MCG TABLET ORAL
Qty: 90 | Refills: 0 | Status: ACTIVE | COMMUNITY
Start: 2023-01-31 | End: 1900-01-01

## 2023-02-17 NOTE — DISCHARGE NOTE PROVIDER - NSDCQMCOGNITION_NEU_ALL_CORE
Diabetic Eye Exam Form    Date Requested: 23  Patient: Tae Ventura  Patient : 1959   Referring Provider: BRIDGER Howell      DIABETIC Eye Exam Date _______________________________      Type of Exam MUST be documented for Diabetic Eye Exams  Please CHECK ONE  Retinal Exam       Dilated Retinal Exam       OCT       Optomap-Iris Exam      Fundus Photography       Left Eye - Please check Retinopathy or No Retinopathy        Exam did show retinopathy    Exam did not show retinopathy       Right Eye - Please check Retinopathy or No Retinopathy       Exam did show retinopathy    Exam did not show retinopathy       Comments __________________________________________________________    Practice Providing Exam ______________________________________________    Exam Performed By (print name) _______________________________________      Provider Signature ___________________________________________________      These reports are needed for  compliance  Please fax this completed form and a copy of the Diabetic Eye Exam report to our office located at [place] [address] as soon as possible via Fax [phone] syl [place]: Phone [phone]  We thank you for your assistance in treating our mutual patient 
No difficulties

## 2023-02-28 ENCOUNTER — RX RENEWAL (OUTPATIENT)
Age: 83
End: 2023-02-28

## 2023-03-17 ENCOUNTER — RX RENEWAL (OUTPATIENT)
Age: 83
End: 2023-03-17

## 2023-03-30 ENCOUNTER — APPOINTMENT (OUTPATIENT)
Dept: CARDIOLOGY | Facility: CLINIC | Age: 83
End: 2023-03-30

## 2023-04-04 ENCOUNTER — APPOINTMENT (OUTPATIENT)
Dept: INTERNAL MEDICINE | Facility: CLINIC | Age: 83
End: 2023-04-04
Payer: MEDICARE

## 2023-04-04 VITALS
WEIGHT: 163 LBS | TEMPERATURE: 97.8 F | DIASTOLIC BLOOD PRESSURE: 73 MMHG | SYSTOLIC BLOOD PRESSURE: 114 MMHG | HEART RATE: 74 BPM | OXYGEN SATURATION: 98 % | BODY MASS INDEX: 26.2 KG/M2 | HEIGHT: 66 IN

## 2023-04-04 DIAGNOSIS — M54.2 CERVICALGIA: ICD-10-CM

## 2023-04-04 PROCEDURE — 36415 COLL VENOUS BLD VENIPUNCTURE: CPT

## 2023-04-04 PROCEDURE — G0439: CPT

## 2023-04-04 PROCEDURE — 99214 OFFICE O/P EST MOD 30 MIN: CPT | Mod: 25

## 2023-04-04 RX ORDER — LINACLOTIDE 72 UG/1
72 CAPSULE, GELATIN COATED ORAL DAILY
Qty: 30 | Refills: 0 | Status: DISCONTINUED | COMMUNITY
Start: 2022-01-27 | End: 2023-04-04

## 2023-04-05 NOTE — ASSESSMENT
[FreeTextEntry1] : Patient appears clinically euvolemic we will continue follow-up with cardiology, vascular surgery consult for chronic venous insufficiency PT ordered given chronic neck stiffness, advised to check blood pressure when patient feels heaviness also check for any electrolyte abnormalities.  Continue follow-up with cardiology and nephrology.

## 2023-04-05 NOTE — HISTORY OF PRESENT ILLNESS
[FreeTextEntry1] : Patient presents for subsequent Medicare annual wellness visit [de-identified] : Currently feels well, denies any shortness of breath lower extremity edema orthopnea chest pain chest tightness patient.  Denies any concerns passing urine.  Wishes to have procedure done for venous insufficiency sometimes after taking medications feels a heaviness in the head denies any syncopal episodes palpitations.  Denies any headaches weakness numbness.  States symptoms last for a few hours denies any diaphoresis nausea vomiting abdominal pain.

## 2023-04-05 NOTE — PHYSICAL EXAM
[Normal] : affect was normal and insight and judgment were intact [de-identified] : Venous stasis changes

## 2023-04-05 NOTE — HEALTH RISK ASSESSMENT
[Good] : ~his/her~  mood as  good [FreeTextEntry1] : Health maintenance [No] : No [No falls in past year] : Patient reported no falls in the past year [Assistive Device] : Patient uses an assistive device [0] : 2) Feeling down, depressed, or hopeless: Not at all (0) [PHQ-2 Negative - No further assessment needed] : PHQ-2 Negative - No further assessment needed [de-identified] : Cardiology and nephrology [de-identified] : Walks with cane [Change in mental status noted] : No change in mental status noted [Language] : denies difficulty with language [Behavior] : denies difficulty with behavior [Learning/Retaining New Information] : denies difficulty learning/retaining new information [Handling Complex Tasks] : denies difficulty handling complex tasks [Reasoning] : denies difficulty with reasoning [Spatial Ability and Orientation] : denies difficulty with spatial ability and orientation [Alone] : lives alone [Fully functional (bathing, dressing, toileting, transferring, walking, feeding)] : Fully functional (bathing, dressing, toileting, transferring, walking, feeding) [Fully functional (using the telephone, shopping, preparing meals, housekeeping, doing laundry, using] : Fully functional and needs no help or supervision to perform IADLs (using the telephone, shopping, preparing meals, housekeeping, doing laundry, using transportation, managing medications and managing finances) [Reports changes in hearing] : Reports no changes in hearing [Reports changes in vision] : Reports no changes in vision [Reports normal functional visual acuity (ie: able to read med bottle)] : Reports normal functional visual acuity [Reports changes in dental health] : Reports no changes in dental health [Smoke Detector] : smoke detector [Carbon Monoxide Detector] : carbon monoxide detector [Guns at Home] : no guns at home [Safety elements used in home] : safety elements used in home [Seat Belt] :  uses seat belt [Sunscreen] : uses sunscreen [Travel to Developing Areas] : does not  travel to developing areas [TB Exposure] : is not being exposed to tuberculosis [Caregiver Concerns] : does not have caregiver concerns [Reviewed no changes] : Reviewed, no changes [AdvancecareDate] : 04/23

## 2023-04-08 LAB
25(OH)D3 SERPL-MCNC: 56.4 NG/ML
ALBUMIN SERPL ELPH-MCNC: 4.7 G/DL
ALP BLD-CCNC: 66 U/L
ALT SERPL-CCNC: 12 U/L
ANION GAP SERPL CALC-SCNC: 17 MMOL/L
APPEARANCE: CLEAR
AST SERPL-CCNC: 10 U/L
BASOPHILS # BLD AUTO: 0.04 K/UL
BASOPHILS NFR BLD AUTO: 0.5 %
BILIRUB SERPL-MCNC: 0.5 MG/DL
BILIRUBIN URINE: NEGATIVE
BLOOD URINE: NEGATIVE
BUN SERPL-MCNC: 86 MG/DL
CALCIUM SERPL-MCNC: 10.1 MG/DL
CHLORIDE SERPL-SCNC: 90 MMOL/L
CHOLEST SERPL-MCNC: 105 MG/DL
CO2 SERPL-SCNC: 33 MMOL/L
COLOR: COLORLESS
CREAT SERPL-MCNC: 2.71 MG/DL
EGFR: 23 ML/MIN/1.73M2
EOSINOPHIL # BLD AUTO: 0.24 K/UL
EOSINOPHIL NFR BLD AUTO: 3 %
ESTIMATED AVERAGE GLUCOSE: 151 MG/DL
GLUCOSE QUALITATIVE U: NEGATIVE
GLUCOSE SERPL-MCNC: 124 MG/DL
HBA1C MFR BLD HPLC: 6.9 %
HCT VFR BLD CALC: 37.7 %
HDLC SERPL-MCNC: 32 MG/DL
HGB BLD-MCNC: 11.4 G/DL
IMM GRANULOCYTES NFR BLD AUTO: 0.2 %
KETONES URINE: NEGATIVE
LDLC SERPL CALC-MCNC: 37 MG/DL
LEUKOCYTE ESTERASE URINE: NEGATIVE
LYMPHOCYTES # BLD AUTO: 1.61 K/UL
LYMPHOCYTES NFR BLD AUTO: 20 %
MAN DIFF?: NORMAL
MCHC RBC-ENTMCNC: 29.9 PG
MCHC RBC-ENTMCNC: 30.2 GM/DL
MCV RBC AUTO: 99 FL
MONOCYTES # BLD AUTO: 0.72 K/UL
MONOCYTES NFR BLD AUTO: 9 %
NEUTROPHILS # BLD AUTO: 5.4 K/UL
NEUTROPHILS NFR BLD AUTO: 67.3 %
NITRITE URINE: NEGATIVE
NONHDLC SERPL-MCNC: 73 MG/DL
NT-PROBNP SERPL-MCNC: 5463 PG/ML
PH URINE: 7
PLATELET # BLD AUTO: 138 K/UL
POTASSIUM SERPL-SCNC: 4.9 MMOL/L
PROT SERPL-MCNC: 7.3 G/DL
PROTEIN URINE: NEGATIVE
RBC # BLD: 3.81 M/UL
RBC # FLD: 15.7 %
SODIUM SERPL-SCNC: 140 MMOL/L
SPECIFIC GRAVITY URINE: 1.01
TRIGL SERPL-MCNC: 180 MG/DL
TSH SERPL-ACNC: 3.06 UIU/ML
URATE SERPL-MCNC: 6.5 MG/DL
UROBILINOGEN URINE: NORMAL
VIT B12 SERPL-MCNC: 579 PG/ML
WBC # FLD AUTO: 8.03 K/UL

## 2023-04-13 ENCOUNTER — RX RENEWAL (OUTPATIENT)
Age: 83
End: 2023-04-13

## 2023-04-17 ENCOUNTER — APPOINTMENT (OUTPATIENT)
Dept: PULMONOLOGY | Facility: CLINIC | Age: 83
End: 2023-04-17
Payer: MEDICARE

## 2023-04-17 VITALS
OXYGEN SATURATION: 96 % | RESPIRATION RATE: 14 BRPM | HEIGHT: 66 IN | DIASTOLIC BLOOD PRESSURE: 73 MMHG | SYSTOLIC BLOOD PRESSURE: 123 MMHG | HEART RATE: 67 BPM

## 2023-04-17 DIAGNOSIS — R18.8 OTHER ASCITES: ICD-10-CM

## 2023-04-17 DIAGNOSIS — E78.2 MIXED HYPERLIPIDEMIA: ICD-10-CM

## 2023-04-17 DIAGNOSIS — M10.9 GOUT, UNSPECIFIED: ICD-10-CM

## 2023-04-17 PROCEDURE — 99214 OFFICE O/P EST MOD 30 MIN: CPT

## 2023-04-17 NOTE — HISTORY OF PRESENT ILLNESS
[Former] : former [Never] : never [TextBox_4] : Patient is a 83-year-old male with past medical history significant for diabetes, hypertension, congestive heart failure, obstructive sleep apnea currently on CPAP who presents today for follow-up.\par \par \par He feels well, offers no complaints. He report compliance with CPAP machine at home using about 3-4 hours nightly. Denies any daytime sleepiness, chest pain, shortness of breath, fevers, chills, cough or hemoptysis

## 2023-04-17 NOTE — PHYSICAL EXAM
[No Acute Distress] : no acute distress [Normal Oropharynx] : normal oropharynx [Normal Appearance] : normal appearance [No Neck Mass] : no neck mass [Normal Rate/Rhythm] : normal rate/rhythm [Normal S1, S2] : normal s1, s2 [No Murmurs] : no murmurs [No Resp Distress] : no resp distress [Clear to Auscultation Bilaterally] : clear to auscultation bilaterally [No Abnormalities] : no abnormalities [Benign] : benign [Normal Gait] : normal gait [No Clubbing] : no clubbing [No Cyanosis] : no cyanosis [FROM] : FROM [Normal Color/ Pigmentation] : normal color/ pigmentation [No Focal Deficits] : no focal deficits [Oriented x3] : oriented x3 [Normal Affect] : normal affect [TextBox_105] : Dependent edema

## 2023-04-21 DIAGNOSIS — M54.9 DORSALGIA, UNSPECIFIED: ICD-10-CM

## 2023-04-21 DIAGNOSIS — R53.81 OTHER MALAISE: ICD-10-CM

## 2023-05-15 ENCOUNTER — APPOINTMENT (OUTPATIENT)
Dept: CARDIOLOGY | Facility: CLINIC | Age: 83
End: 2023-05-15
Payer: MEDICARE

## 2023-05-15 ENCOUNTER — NON-APPOINTMENT (OUTPATIENT)
Age: 83
End: 2023-05-15

## 2023-05-15 VITALS
DIASTOLIC BLOOD PRESSURE: 50 MMHG | OXYGEN SATURATION: 93 % | HEIGHT: 66 IN | WEIGHT: 160 LBS | HEART RATE: 64 BPM | BODY MASS INDEX: 25.71 KG/M2 | RESPIRATION RATE: 14 BRPM | SYSTOLIC BLOOD PRESSURE: 86 MMHG

## 2023-05-15 DIAGNOSIS — D64.9 ANEMIA, UNSPECIFIED: ICD-10-CM

## 2023-05-15 PROCEDURE — 93000 ELECTROCARDIOGRAM COMPLETE: CPT

## 2023-05-15 PROCEDURE — 99214 OFFICE O/P EST MOD 30 MIN: CPT

## 2023-05-15 NOTE — REVIEW OF SYSTEMS
[Weight Gain (___ Lbs)] : no recent weight gain [SOB] : no shortness of breath [Dyspnea on exertion] : not dyspnea during exertion [Chest Discomfort] : no chest discomfort [Leg Claudication] : no intermittent leg claudication [Syncope] : no syncope

## 2023-05-15 NOTE — DISCUSSION/SUMMARY
[FreeTextEntry1] : The patient is an 83-year-old Azeri gentleman CVA, CKD, BPH, afib, PVD, HFrEF,venous stasis who is euvolemic s/p lt THR whose BP is low\par #1 CV- LV function now 55%,\par #2 HFrEF- global LV dysfunction resolved,c/w  bumex 2mg twice a day K20mEq,and metolazone 5mg every other week with 20 extra K, Labs today\par #3 HT- c/w coreg and losartan, temporarily cut dose coreg to 12.5mg in view of BP \par #4 Afib- c/w eliquis 5mg bid\par #5 CKD- stable, f/u Dr. Gonzalez\par #6 BPH- continue tamsulosin\par #7 PVD- negative arterial and venous doppler, f/u vascular\par #8 Ortho- s/p left THR [EKG obtained to assist in diagnosis and management of assessed problem(s)] : EKG obtained to assist in diagnosis and management of assessed problem(s)

## 2023-05-15 NOTE — HISTORY OF PRESENT ILLNESS
[FreeTextEntry1] : Tim has been coughing a lot for the last two weeks. No lightheadedness, dizziness or fever. OTherwise feels fine.

## 2023-05-16 LAB
ANION GAP SERPL CALC-SCNC: 22 MMOL/L
BASOPHILS # BLD AUTO: 0.04 K/UL
BASOPHILS NFR BLD AUTO: 0.5 %
BUN SERPL-MCNC: 142 MG/DL
CALCIUM SERPL-MCNC: 10.4 MG/DL
CHLORIDE SERPL-SCNC: 82 MMOL/L
CO2 SERPL-SCNC: 32 MMOL/L
CREAT SERPL-MCNC: 3.58 MG/DL
EGFR: 16 ML/MIN/1.73M2
EOSINOPHIL # BLD AUTO: 0.23 K/UL
EOSINOPHIL NFR BLD AUTO: 2.9 %
GLUCOSE SERPL-MCNC: 121 MG/DL
HCT VFR BLD CALC: 38.8 %
HGB BLD-MCNC: 12.1 G/DL
IMM GRANULOCYTES NFR BLD AUTO: 0.2 %
LYMPHOCYTES # BLD AUTO: 1.56 K/UL
LYMPHOCYTES NFR BLD AUTO: 19.4 %
MAN DIFF?: NORMAL
MCHC RBC-ENTMCNC: 29.6 PG
MCHC RBC-ENTMCNC: 31.2 GM/DL
MCV RBC AUTO: 94.9 FL
MONOCYTES # BLD AUTO: 0.77 K/UL
MONOCYTES NFR BLD AUTO: 9.6 %
NEUTROPHILS # BLD AUTO: 5.44 K/UL
NEUTROPHILS NFR BLD AUTO: 67.4 %
NT-PROBNP SERPL-MCNC: 5182 PG/ML
PLATELET # BLD AUTO: 136 K/UL
POTASSIUM SERPL-SCNC: 4 MMOL/L
RBC # BLD: 4.09 M/UL
RBC # FLD: 15 %
SODIUM SERPL-SCNC: 137 MMOL/L
WBC # FLD AUTO: 8.06 K/UL

## 2023-05-17 RX ORDER — BUMETANIDE 2 MG/1
2 TABLET ORAL DAILY
Qty: 90 | Refills: 3 | Status: ACTIVE | COMMUNITY
Start: 2020-11-10 | End: 1900-01-01

## 2023-05-28 ENCOUNTER — RX RENEWAL (OUTPATIENT)
Age: 83
End: 2023-05-28

## 2023-06-26 ENCOUNTER — APPOINTMENT (OUTPATIENT)
Dept: CARDIOLOGY | Facility: CLINIC | Age: 83
End: 2023-06-26
Payer: MEDICARE

## 2023-06-26 ENCOUNTER — NON-APPOINTMENT (OUTPATIENT)
Age: 83
End: 2023-06-26

## 2023-06-26 VITALS
DIASTOLIC BLOOD PRESSURE: 57 MMHG | OXYGEN SATURATION: 92 % | HEART RATE: 78 BPM | WEIGHT: 162 LBS | TEMPERATURE: 97.9 F | RESPIRATION RATE: 14 BRPM | HEIGHT: 66 IN | BODY MASS INDEX: 26.03 KG/M2 | SYSTOLIC BLOOD PRESSURE: 95 MMHG

## 2023-06-26 DIAGNOSIS — N17.9 ACUTE KIDNEY FAILURE, UNSPECIFIED: ICD-10-CM

## 2023-06-26 PROCEDURE — 93000 ELECTROCARDIOGRAM COMPLETE: CPT

## 2023-06-26 PROCEDURE — 99214 OFFICE O/P EST MOD 30 MIN: CPT

## 2023-06-26 NOTE — HISTORY OF PRESENT ILLNESS
[FreeTextEntry1] : Tim is not feeling well. He started having LE edema and gained weight. Here without his son today. Son called and said he is getting demented.

## 2023-06-26 NOTE — REVIEW OF SYSTEMS
[Negative] : Heme/Lymph [Weight Gain (___ Lbs)] : [unfilled] ~Ulb weight gain [Weight Loss (___ Lbs)] : no recent weight loss [SOB] : no shortness of breath [Dyspnea on exertion] : not dyspnea during exertion [Chest Discomfort] : no chest discomfort [Leg Claudication] : no intermittent leg claudication [Syncope] : no syncope

## 2023-06-26 NOTE — DISCUSSION/SUMMARY
[FreeTextEntry1] : The patient is an 83-year-old Kiswahili gentleman CVA, CKD, BPH, afib, PVD, HFrEF,venous stasis who is fluid overloaded\par #1 CV- LV function now 55%,\par #2 HFrEF- global LV dysfunction resolved, increase  bumex 1mg twice a day K20mEq,and metolazone 5mg once a week with 20 extra K, Labs today\par #3 HT- c/w coreg and losartan, c/w coreg 12.5mg in view of BP \par #4 Afib- c/w eliquis 5mg bid\par #5 CKD- stable, f/u Dr. Gonzalez\par #6 BPH- continue tamsulosin\par #7 PVD- negative arterial and venous doppler, f/u vascular\par #8 Ortho- s/p left THR [EKG obtained to assist in diagnosis and management of assessed problem(s)] : EKG obtained to assist in diagnosis and management of assessed problem(s)

## 2023-06-27 LAB
ANION GAP SERPL CALC-SCNC: 15 MMOL/L
BUN SERPL-MCNC: 53 MG/DL
CALCIUM SERPL-MCNC: 9.9 MG/DL
CHLORIDE SERPL-SCNC: 101 MMOL/L
CO2 SERPL-SCNC: 28 MMOL/L
CREAT SERPL-MCNC: 2.14 MG/DL
EGFR: 30 ML/MIN/1.73M2
GLUCOSE SERPL-MCNC: 140 MG/DL
NT-PROBNP SERPL-MCNC: 5404 PG/ML
POTASSIUM SERPL-SCNC: 4.3 MMOL/L
SODIUM SERPL-SCNC: 144 MMOL/L

## 2023-06-29 ENCOUNTER — APPOINTMENT (OUTPATIENT)
Dept: CARDIOLOGY | Facility: CLINIC | Age: 83
End: 2023-06-29

## 2023-07-11 ENCOUNTER — RX RENEWAL (OUTPATIENT)
Age: 83
End: 2023-07-11

## 2023-07-13 ENCOUNTER — FORM ENCOUNTER (OUTPATIENT)
Age: 83
End: 2023-07-13

## 2023-07-14 ENCOUNTER — APPOINTMENT (OUTPATIENT)
Dept: SLEEP CENTER | Facility: CLINIC | Age: 83
End: 2023-07-14
Payer: MEDICARE

## 2023-07-14 ENCOUNTER — OUTPATIENT (OUTPATIENT)
Dept: OUTPATIENT SERVICES | Facility: HOSPITAL | Age: 83
LOS: 1 days | End: 2023-07-14
Payer: MEDICARE

## 2023-07-14 DIAGNOSIS — C67.9 MALIGNANT NEOPLASM OF BLADDER, UNSPECIFIED: Chronic | ICD-10-CM

## 2023-07-14 PROCEDURE — 95810 POLYSOM 6/> YRS 4/> PARAM: CPT

## 2023-07-14 PROCEDURE — 95810 POLYSOM 6/> YRS 4/> PARAM: CPT | Mod: 26

## 2023-07-20 DIAGNOSIS — G47.33 OBSTRUCTIVE SLEEP APNEA (ADULT) (PEDIATRIC): ICD-10-CM

## 2023-07-27 ENCOUNTER — RX RENEWAL (OUTPATIENT)
Age: 83
End: 2023-07-27

## 2023-07-27 RX ORDER — APIXABAN 5 MG/1
5 TABLET, FILM COATED ORAL
Qty: 180 | Refills: 3 | Status: ACTIVE | COMMUNITY
Start: 2019-11-15 | End: 1900-01-01

## 2023-08-03 ENCOUNTER — APPOINTMENT (OUTPATIENT)
Dept: CARDIOLOGY | Facility: CLINIC | Age: 83
End: 2023-08-03
Payer: MEDICARE

## 2023-08-03 VITALS
WEIGHT: 158 LBS | OXYGEN SATURATION: 97 % | BODY MASS INDEX: 25.39 KG/M2 | SYSTOLIC BLOOD PRESSURE: 94 MMHG | HEART RATE: 64 BPM | DIASTOLIC BLOOD PRESSURE: 58 MMHG | HEIGHT: 66 IN | RESPIRATION RATE: 14 BRPM

## 2023-08-03 DIAGNOSIS — M79.89 OTHER SPECIFIED SOFT TISSUE DISORDERS: ICD-10-CM

## 2023-08-03 PROCEDURE — 99214 OFFICE O/P EST MOD 30 MIN: CPT

## 2023-08-03 PROCEDURE — 93000 ELECTROCARDIOGRAM COMPLETE: CPT

## 2023-08-04 LAB
ANION GAP SERPL CALC-SCNC: 20 MMOL/L
BUN SERPL-MCNC: 129 MG/DL
CALCIUM SERPL-MCNC: 9.9 MG/DL
CHLORIDE SERPL-SCNC: 89 MMOL/L
CO2 SERPL-SCNC: 34 MMOL/L
CREAT SERPL-MCNC: 3.49 MG/DL
EGFR: 17 ML/MIN/1.73M2
GLUCOSE SERPL-MCNC: 132 MG/DL
NT-PROBNP SERPL-MCNC: 4802 PG/ML
POTASSIUM SERPL-SCNC: 3.9 MMOL/L
SODIUM SERPL-SCNC: 143 MMOL/L

## 2023-08-05 PROBLEM — M79.89 LEG SWELLING: Status: ACTIVE | Noted: 2021-03-17

## 2023-08-05 NOTE — DISCUSSION/SUMMARY
[FreeTextEntry1] : The patient is an 83-year-old Irish gentleman CVA, CKD, BPH, afib, PVD, HFrEF,venous stasis who is euvolemic. #1 CV- LV function now 55%, #2 HFrEF- global LV dysfunction resolved, decrease  bumex 1mg twice a day 3x week, K20mEq,and metolazone 5mg once every other week with 20 extra K, Labs today #3 HT- c/w coreg and losartan, c/w coreg 12.5mg in view of BP  #4 Afib- c/w eliquis 5mg bid #5 CKD- stable, f/u Dr. Gonzalez #6 BPH- continue tamsulosin #7 PVD- negative arterial and venous doppler, f/u vascular #8 Ortho- s/p left THR [EKG obtained to assist in diagnosis and management of assessed problem(s)] : EKG obtained to assist in diagnosis and management of assessed problem(s)

## 2023-08-05 NOTE — REVIEW OF SYSTEMS
[Weight Gain (___ Lbs)] : [unfilled] ~Ulb weight gain [Negative] : Heme/Lymph [Weight Loss (___ Lbs)] : no recent weight loss [SOB] : no shortness of breath [Dyspnea on exertion] : not dyspnea during exertion [Chest Discomfort] : no chest discomfort [Leg Claudication] : no intermittent leg claudication [Syncope] : no syncope

## 2023-08-05 NOTE — HISTORY OF PRESENT ILLNESS
[FreeTextEntry1] : Themis is feeling much better. Diuresed well on metolazone once a week, bid bumex and extra K with metolazone. Feels better and legs better. Gets upset about discoloration.

## 2023-08-17 ENCOUNTER — TRANSCRIPTION ENCOUNTER (OUTPATIENT)
Age: 83
End: 2023-08-17

## 2023-08-21 ENCOUNTER — LABORATORY RESULT (OUTPATIENT)
Age: 83
End: 2023-08-21

## 2023-08-30 ENCOUNTER — APPOINTMENT (OUTPATIENT)
Dept: PULMONOLOGY | Facility: CLINIC | Age: 83
End: 2023-08-30
Payer: MEDICARE

## 2023-08-30 VITALS
DIASTOLIC BLOOD PRESSURE: 62 MMHG | RESPIRATION RATE: 14 BRPM | SYSTOLIC BLOOD PRESSURE: 110 MMHG | HEART RATE: 60 BPM | TEMPERATURE: 97.5 F | OXYGEN SATURATION: 99 %

## 2023-08-30 DIAGNOSIS — J44.9 CHRONIC OBSTRUCTIVE PULMONARY DISEASE, UNSPECIFIED: ICD-10-CM

## 2023-08-30 DIAGNOSIS — Z86.39 PERSONAL HISTORY OF OTHER ENDOCRINE, NUTRITIONAL AND METABOLIC DISEASE: ICD-10-CM

## 2023-08-30 PROCEDURE — 99214 OFFICE O/P EST MOD 30 MIN: CPT

## 2023-09-23 NOTE — ED PROVIDER NOTE - CARE PLAN
Pt sitting in crouched/hunched position tipped over falling out of the bed.  Pt fell to the  floor hitting head and right shoulder.  Red grant noted on forehead, no other injuries observed.  MD notified.   Principal Discharge DX:	Injury of head, initial encounter  Secondary Diagnosis:	Scalp laceration, initial encounter

## 2023-10-04 NOTE — PHYSICAL THERAPY INITIAL EVALUATION ADULT - PLANNED THERAPY INTERVENTIONS, PT EVAL
A nurse from [facility] called and stated that the patient has been discharged from home care.   gait training/balance training/transfer training/GOAL: Pt will negotiate 10 steps with 1 HR and step to pattern independently in 4 weeks.

## 2023-10-09 ENCOUNTER — RX RENEWAL (OUTPATIENT)
Age: 83
End: 2023-10-09

## 2023-10-12 ENCOUNTER — NON-APPOINTMENT (OUTPATIENT)
Age: 83
End: 2023-10-12

## 2023-10-12 ENCOUNTER — APPOINTMENT (OUTPATIENT)
Dept: CARDIOLOGY | Facility: CLINIC | Age: 83
End: 2023-10-12
Payer: MEDICARE

## 2023-10-12 VITALS
DIASTOLIC BLOOD PRESSURE: 70 MMHG | SYSTOLIC BLOOD PRESSURE: 108 MMHG | OXYGEN SATURATION: 98 % | RESPIRATION RATE: 12 BRPM | HEIGHT: 66 IN | HEART RATE: 64 BPM

## 2023-10-12 DIAGNOSIS — I50.20 UNSPECIFIED SYSTOLIC (CONGESTIVE) HEART FAILURE: ICD-10-CM

## 2023-10-12 DIAGNOSIS — N18.4 CHRONIC KIDNEY DISEASE, STAGE 4 (SEVERE): ICD-10-CM

## 2023-10-12 PROCEDURE — 99214 OFFICE O/P EST MOD 30 MIN: CPT

## 2023-10-12 PROCEDURE — 93000 ELECTROCARDIOGRAM COMPLETE: CPT

## 2023-10-13 LAB
ANION GAP SERPL CALC-SCNC: 14 MMOL/L
BUN SERPL-MCNC: 112 MG/DL
CALCIUM SERPL-MCNC: 10.3 MG/DL
CHLORIDE SERPL-SCNC: 84 MMOL/L
CO2 SERPL-SCNC: 36 MMOL/L
CREAT SERPL-MCNC: 2.97 MG/DL
EGFR: 20 ML/MIN/1.73M2
GLUCOSE SERPL-MCNC: 118 MG/DL
NT-PROBNP SERPL-MCNC: 5560 PG/ML
POTASSIUM SERPL-SCNC: 4.5 MMOL/L
SODIUM SERPL-SCNC: 134 MMOL/L

## 2023-10-13 RX ORDER — METOLAZONE 5 MG/1
5 TABLET ORAL
Qty: 45 | Refills: 0 | Status: ACTIVE | COMMUNITY
Start: 2021-03-12 | End: 1900-01-01

## 2023-10-26 ENCOUNTER — LABORATORY RESULT (OUTPATIENT)
Age: 83
End: 2023-10-26

## 2023-10-29 ENCOUNTER — RX RENEWAL (OUTPATIENT)
Age: 83
End: 2023-10-29

## 2023-10-29 RX ORDER — ATORVASTATIN CALCIUM 40 MG/1
40 TABLET, FILM COATED ORAL
Qty: 90 | Refills: 3 | Status: ACTIVE | COMMUNITY
Start: 2020-11-10 | End: 1900-01-01

## 2023-10-30 ENCOUNTER — RX RENEWAL (OUTPATIENT)
Age: 83
End: 2023-10-30

## 2023-10-30 RX ORDER — ALLOPURINOL 100 MG/1
100 TABLET ORAL DAILY
Qty: 90 | Refills: 3 | Status: ACTIVE | COMMUNITY
Start: 2021-03-11 | End: 1900-01-01

## 2023-10-30 RX ORDER — CARVEDILOL 25 MG/1
25 TABLET, FILM COATED ORAL
Qty: 180 | Refills: 3 | Status: ACTIVE | COMMUNITY
Start: 2020-11-10 | End: 1900-01-01

## 2023-11-06 ENCOUNTER — RX RENEWAL (OUTPATIENT)
Age: 83
End: 2023-11-06

## 2023-11-06 RX ORDER — FERROUS SULFATE TAB 325 MG (65 MG ELEMENTAL FE) 325 (65 FE) MG
325 (65 FE) TAB ORAL
Qty: 180 | Refills: 0 | Status: ACTIVE | COMMUNITY
Start: 2022-01-24 | End: 1900-01-01

## 2023-12-14 ENCOUNTER — APPOINTMENT (OUTPATIENT)
Dept: CARDIOLOGY | Facility: CLINIC | Age: 83
End: 2023-12-14
Payer: MEDICARE

## 2023-12-14 ENCOUNTER — NON-APPOINTMENT (OUTPATIENT)
Age: 83
End: 2023-12-14

## 2023-12-14 VITALS
RESPIRATION RATE: 12 BRPM | BODY MASS INDEX: 25.71 KG/M2 | HEART RATE: 65 BPM | DIASTOLIC BLOOD PRESSURE: 72 MMHG | HEIGHT: 66 IN | WEIGHT: 160 LBS | OXYGEN SATURATION: 97 % | SYSTOLIC BLOOD PRESSURE: 122 MMHG

## 2023-12-14 DIAGNOSIS — R60.0 LOCALIZED EDEMA: ICD-10-CM

## 2023-12-14 DIAGNOSIS — E87.1 HYPO-OSMOLALITY AND HYPONATREMIA: ICD-10-CM

## 2023-12-14 PROCEDURE — 99214 OFFICE O/P EST MOD 30 MIN: CPT

## 2023-12-14 PROCEDURE — 93000 ELECTROCARDIOGRAM COMPLETE: CPT

## 2023-12-15 PROBLEM — E87.1 HYPONATREMIA: Status: ACTIVE | Noted: 2020-05-29

## 2023-12-15 PROBLEM — R60.0 EDEMA OF BOTH FEET: Status: ACTIVE | Noted: 2017-04-27

## 2023-12-15 LAB
ANION GAP SERPL CALC-SCNC: 15 MMOL/L
BUN SERPL-MCNC: 84 MG/DL
CALCIUM SERPL-MCNC: 9.8 MG/DL
CHLORIDE SERPL-SCNC: 93 MMOL/L
CO2 SERPL-SCNC: 33 MMOL/L
CREAT SERPL-MCNC: 2.46 MG/DL
EGFR: 26 ML/MIN/1.73M2
GLUCOSE SERPL-MCNC: 151 MG/DL
NT-PROBNP SERPL-MCNC: 4565 PG/ML
POTASSIUM SERPL-SCNC: 3.8 MMOL/L
SODIUM SERPL-SCNC: 140 MMOL/L

## 2023-12-15 NOTE — DISCUSSION/SUMMARY
[FreeTextEntry1] : The patient is an 83-year-old Telugu gentleman CVA, CKD, BPH, afib, PVD, HFrEF,venous stasis whose exam has improved.  #1 CV- LV function now 55%, #2 HFrEF- global LV dysfunction resolved, decrease  bumex 1mg twice a day 3x week, K20mEq,and metolazone 5mg once every week with 20 extra K, Labs today #3 HT- c/w coreg and losartan, c/w coreg 12.5mg in view of BP  #4 Afib- c/w eliquis 5mg bid #5 CKD- stable, f/u Dr. Gonzalez #6 BPH- continue tamsulosin #7 PVD- negative arterial and venous doppler, f/u vascular #8 Ortho- s/p left THR #9 Pulm- severe RODRIGO, on NIPS, recent adjustments made [EKG obtained to assist in diagnosis and management of assessed problem(s)] : EKG obtained to assist in diagnosis and management of assessed problem(s)

## 2024-02-01 ENCOUNTER — APPOINTMENT (OUTPATIENT)
Dept: CARDIOLOGY | Facility: CLINIC | Age: 84
End: 2024-02-01
Payer: MEDICARE

## 2024-02-01 ENCOUNTER — NON-APPOINTMENT (OUTPATIENT)
Age: 84
End: 2024-02-01

## 2024-02-01 VITALS
TEMPERATURE: 97.5 F | DIASTOLIC BLOOD PRESSURE: 60 MMHG | HEIGHT: 66 IN | BODY MASS INDEX: 26.52 KG/M2 | WEIGHT: 165 LBS | HEART RATE: 64 BPM | RESPIRATION RATE: 12 BRPM | OXYGEN SATURATION: 95 % | SYSTOLIC BLOOD PRESSURE: 97 MMHG

## 2024-02-01 PROCEDURE — 99214 OFFICE O/P EST MOD 30 MIN: CPT | Mod: 25

## 2024-02-01 PROCEDURE — 93000 ELECTROCARDIOGRAM COMPLETE: CPT

## 2024-02-02 LAB
ANION GAP SERPL CALC-SCNC: 17 MMOL/L
BUN SERPL-MCNC: 91 MG/DL
CALCIUM SERPL-MCNC: 9.8 MG/DL
CHLORIDE SERPL-SCNC: 90 MMOL/L
CO2 SERPL-SCNC: 32 MMOL/L
CREAT SERPL-MCNC: 2.69 MG/DL
EGFR: 23 ML/MIN/1.73M2
GLUCOSE SERPL-MCNC: 131 MG/DL
NT-PROBNP SERPL-MCNC: 4571 PG/ML
POTASSIUM SERPL-SCNC: 3.1 MMOL/L
SODIUM SERPL-SCNC: 139 MMOL/L

## 2024-02-02 NOTE — DISCUSSION/SUMMARY
[FreeTextEntry1] : The patient is an 83-year-old Czech gentleman CVA, CKD, BPH, afib, PVD, HFrEF,venous stasis whose exam is stable. #1 CV- LV function now 55%, #2 HFrEF- global LV dysfunction resolved, decrease  bumex 1mg twice a day 3x week, K20mEq,and metolazone 5mg once every week with 20 extra K, Labs today #3 HT- c/w coreg and losartan, c/w coreg 12.5mg in view of BP  #4 Afib- c/w eliquis 5mg bid #5 CKD- stable, f/u Dr. Gonzalez #6 BPH- continue tamsulosin #7 PVD- negative arterial and venous doppler, f/u vascular #8 Ortho- s/p left THR #9 Pulm- severe RODRIGO, on NIPS, recent adjustments made [EKG obtained to assist in diagnosis and management of assessed problem(s)] : EKG obtained to assist in diagnosis and management of assessed problem(s)

## 2024-02-16 NOTE — PATIENT PROFILE ADULT - NSPROEXTENSIONSOFSELF_GEN_A_NUR
No bleeding since surgery.  Will observe for now.  Will start progestin therapy if bleeding returns.   none

## 2024-03-09 NOTE — ASSESSMENT
[FreeTextEntry1] : In summary the patient is a 83-year-old male with multiple medical problems including congestive heart failure atrial fibrillation, COPD, obstructive sleep apnea presently on CPAP who presents for follow-up.  The patient's edema has resolved as well as his expiratory wheezing.  Had a lengthy discussion with family at bedside.  Patient is instructed to continue current medications and follow-up in 3 months\par repeat Home Sleep Study
No Vaccines Administered.

## 2024-03-15 ENCOUNTER — RX RENEWAL (OUTPATIENT)
Age: 84
End: 2024-03-15

## 2024-03-16 ENCOUNTER — RX RENEWAL (OUTPATIENT)
Age: 84
End: 2024-03-16

## 2024-03-18 ENCOUNTER — APPOINTMENT (OUTPATIENT)
Dept: PULMONOLOGY | Facility: CLINIC | Age: 84
End: 2024-03-18
Payer: MEDICARE

## 2024-03-18 VITALS
DIASTOLIC BLOOD PRESSURE: 65 MMHG | RESPIRATION RATE: 12 BRPM | OXYGEN SATURATION: 95 % | HEART RATE: 77 BPM | WEIGHT: 162 LBS | SYSTOLIC BLOOD PRESSURE: 108 MMHG | HEIGHT: 66 IN | BODY MASS INDEX: 26.03 KG/M2

## 2024-03-18 DIAGNOSIS — Z87.898 PERSONAL HISTORY OF OTHER SPECIFIED CONDITIONS: ICD-10-CM

## 2024-03-18 DIAGNOSIS — I26.99 OTHER PULMONARY EMBOLISM W/OUT ACUTE COR PULMONALE: ICD-10-CM

## 2024-03-18 DIAGNOSIS — Z86.79 PERSONAL HISTORY OF OTHER DISEASES OF THE CIRCULATORY SYSTEM: ICD-10-CM

## 2024-03-18 DIAGNOSIS — N18.2 CHRONIC KIDNEY DISEASE, STAGE 2 (MILD): ICD-10-CM

## 2024-03-18 PROCEDURE — 99214 OFFICE O/P EST MOD 30 MIN: CPT

## 2024-04-03 NOTE — PHYSICAL EXAM
[Well Developed] : well developed [Well Nourished] : well nourished [No Acute Distress] : no acute distress [Normal Conjunctiva] : normal conjunctiva [Normal Venous Pressure] : normal venous pressure [No Carotid Bruit] : no carotid bruit [Normal S1, S2] : normal S1, S2 [No Murmur] : no murmur [No Rub] : no rub [No Gallop] : no gallop [No Respiratory Distress] : no respiratory distress  [Wheeze ____] : wheeze [unfilled] [Soft] : abdomen soft [Non Tender] : non-tender [No Masses/organomegaly] : no masses/organomegaly [Normal Gait] : normal gait [Normal Bowel Sounds] : normal bowel sounds [No Edema] : no edema [No Cyanosis] : no cyanosis [No Clubbing] : no clubbing [No Varicosities] : no varicosities [No Rash] : no rash [No Skin Lesions] : no skin lesions [Moves all extremities] : moves all extremities [No Focal Deficits] : no focal deficits [Alert and Oriented] : alert and oriented [Normal Speech] : normal speech [Normal memory] : normal memory

## 2024-04-04 ENCOUNTER — NON-APPOINTMENT (OUTPATIENT)
Age: 84
End: 2024-04-04

## 2024-04-04 ENCOUNTER — APPOINTMENT (OUTPATIENT)
Dept: CARDIOLOGY | Facility: CLINIC | Age: 84
End: 2024-04-04
Payer: MEDICARE

## 2024-04-04 VITALS
HEIGHT: 66 IN | WEIGHT: 162 LBS | DIASTOLIC BLOOD PRESSURE: 55 MMHG | SYSTOLIC BLOOD PRESSURE: 127 MMHG | BODY MASS INDEX: 26.03 KG/M2 | RESPIRATION RATE: 12 BRPM | TEMPERATURE: 97.6 F | OXYGEN SATURATION: 95 % | HEART RATE: 61 BPM

## 2024-04-04 DIAGNOSIS — I48.20 CHRONIC ATRIAL FIBRILLATION, UNSP: ICD-10-CM

## 2024-04-04 DIAGNOSIS — I87.2 VENOUS INSUFFICIENCY (CHRONIC) (PERIPHERAL): ICD-10-CM

## 2024-04-04 DIAGNOSIS — N18.9 CHRONIC KIDNEY DISEASE, UNSPECIFIED: ICD-10-CM

## 2024-04-04 DIAGNOSIS — I10 ESSENTIAL (PRIMARY) HYPERTENSION: ICD-10-CM

## 2024-04-04 PROCEDURE — 99214 OFFICE O/P EST MOD 30 MIN: CPT

## 2024-04-04 PROCEDURE — G2211 COMPLEX E/M VISIT ADD ON: CPT

## 2024-04-04 PROCEDURE — 93000 ELECTROCARDIOGRAM COMPLETE: CPT

## 2024-04-23 ENCOUNTER — RX RENEWAL (OUTPATIENT)
Age: 84
End: 2024-04-23

## 2024-04-24 ENCOUNTER — LABORATORY RESULT (OUTPATIENT)
Age: 84
End: 2024-04-24

## 2024-04-24 ENCOUNTER — APPOINTMENT (OUTPATIENT)
Dept: INTERNAL MEDICINE | Facility: CLINIC | Age: 84
End: 2024-04-24
Payer: MEDICARE

## 2024-04-24 VITALS
DIASTOLIC BLOOD PRESSURE: 67 MMHG | HEIGHT: 66 IN | TEMPERATURE: 97.4 F | HEART RATE: 55 BPM | SYSTOLIC BLOOD PRESSURE: 123 MMHG | WEIGHT: 167 LBS | BODY MASS INDEX: 26.84 KG/M2 | OXYGEN SATURATION: 93 %

## 2024-04-24 DIAGNOSIS — Z00.00 ENCOUNTER FOR GENERAL ADULT MEDICAL EXAMINATION W/OUT ABNORMAL FINDINGS: ICD-10-CM

## 2024-04-24 DIAGNOSIS — L29.9 PRURITUS, UNSPECIFIED: ICD-10-CM

## 2024-04-24 PROCEDURE — 36415 COLL VENOUS BLD VENIPUNCTURE: CPT

## 2024-04-24 PROCEDURE — G0439: CPT

## 2024-04-24 RX ORDER — HYDROCORTISONE 1 %
12 CREAM (GRAM) TOPICAL TWICE DAILY
Qty: 1 | Refills: 11 | Status: ACTIVE | COMMUNITY
Start: 2024-04-24 | End: 1900-01-01

## 2024-04-25 NOTE — ASSESSMENT
[FreeTextEntry1] : Annual Physical Exam - BP is stable. Continue current management. - Check A1c, lipid panels, vitamin levels, urine analysis. - Rx Ammonium Lactate 12% AM.PM, Triamcinolone 0.5% 2-3x/daily for pruritis-on arm advised steroid cream for one week    - RTO annually or as needed.   Pt verbalized understanding and will reach should any questions/concerns occur.

## 2024-04-25 NOTE — HEALTH RISK ASSESSMENT
[Good] : ~his/her~  mood as  good [No] : No [Fully functional (bathing, dressing, toileting, transferring, walking, feeding)] : Fully functional (bathing, dressing, toileting, transferring, walking, feeding) [Fully functional (using the telephone, shopping, preparing meals, housekeeping, doing laundry, using] : Fully functional and needs no help or supervision to perform IADLs (using the telephone, shopping, preparing meals, housekeeping, doing laundry, using transportation, managing medications and managing finances) [Reports normal functional visual acuity (ie: able to read med bottle)] : Reports normal functional visual acuity [Smoke Detector] : smoke detector [Carbon Monoxide Detector] : carbon monoxide detector [Safety elements used in home] : safety elements used in home [Seat Belt] :  uses seat belt [Sunscreen] : uses sunscreen [Never] : Never [FreeTextEntry1] : health maintenance [de-identified] : CARD, PULM, NEPH, PT [Change in mental status noted] : No change in mental status noted [Language] : denies difficulty with language [Behavior] : denies difficulty with behavior [Learning/Retaining New Information] : denies difficulty learning/retaining new information [Handling Complex Tasks] : denies difficulty handling complex tasks [Reasoning] : denies difficulty with reasoning [Spatial Ability and Orientation] : denies difficulty with spatial ability and orientation [Reports changes in hearing] : Reports no changes in hearing [Reports changes in vision] : Reports no changes in vision [Reports changes in dental health] : Reports no changes in dental health [Guns at Home] : no guns at home [Travel to Developing Areas] : does not  travel to developing areas [TB Exposure] : is not being exposed to tuberculosis [Caregiver Concerns] : does not have caregiver concerns

## 2024-04-25 NOTE — ADDENDUM
[FreeTextEntry1] : I, Caryl Horner, acted as a scribe on behalf of Dr. Catalino Lara MD, on 04/24/2024.   All medical entries made by the scribe were at my, Dr. Catalino Lara MD, direction and personally dictated by me on 04/24/2024. I have reviewed the chart and agree that the record accurately reflects my personal performance of the history, physical exam, assessment and plan. I have also personally directed, reviewed, and agreed with the chart.

## 2024-04-25 NOTE — PHYSICAL EXAM
[No Acute Distress] : no acute distress [Well Nourished] : well nourished [Well Developed] : well developed [Well-Appearing] : well-appearing [Normal Sclera/Conjunctiva] : normal sclera/conjunctiva [PERRL] : pupils equal round and reactive to light [EOMI] : extraocular movements intact [Normal Oropharynx] : the oropharynx was normal [Normal Outer Ear/Nose] : the outer ears and nose were normal in appearance [No JVD] : no jugular venous distention [No Lymphadenopathy] : no lymphadenopathy [Supple] : supple [Thyroid Normal, No Nodules] : the thyroid was normal and there were no nodules present [No Respiratory Distress] : no respiratory distress  [No Accessory Muscle Use] : no accessory muscle use [Clear to Auscultation] : lungs were clear to auscultation bilaterally [Normal Rate] : normal rate  [Regular Rhythm] : with a regular rhythm [Normal S1, S2] : normal S1 and S2 [No Murmur] : no murmur heard [No Carotid Bruits] : no carotid bruits [No Abdominal Bruit] : a ~M bruit was not heard ~T in the abdomen [No Varicosities] : no varicosities [Pedal Pulses Present] : the pedal pulses are present [No Edema] : there was no peripheral edema [No Palpable Aorta] : no palpable aorta [No Extremity Clubbing/Cyanosis] : no extremity clubbing/cyanosis [Soft] : abdomen soft [Non Tender] : non-tender [Non-distended] : non-distended [No Masses] : no abdominal mass palpated [No HSM] : no HSM [Normal Bowel Sounds] : normal bowel sounds [Normal Posterior Cervical Nodes] : no posterior cervical lymphadenopathy [Normal Anterior Cervical Nodes] : no anterior cervical lymphadenopathy [No CVA Tenderness] : no CVA  tenderness [No Spinal Tenderness] : no spinal tenderness [No Joint Swelling] : no joint swelling [Grossly Normal Strength/Tone] : grossly normal strength/tone [No Rash] : no rash [Coordination Grossly Intact] : coordination grossly intact [No Focal Deficits] : no focal deficits [Normal Gait] : normal gait [Deep Tendon Reflexes (DTR)] : deep tendon reflexes were 2+ and symmetric [Normal Affect] : the affect was normal [Normal Insight/Judgement] : insight and judgment were intact [de-identified] : erythematous patch on arm, excoriations seen

## 2024-04-25 NOTE — HISTORY OF PRESENT ILLNESS
[de-identified] : Patient is doing well overall and has not gotten sick since last visit. Pt is following with vascular given lower extremity edema, denies any shortness of breath, walks approximately one mile Denies any CP, chest tightness or SOB. Denies any abdominal pain, urinary symptom, or change in bowel habits. Denies any fever, chills, or night sweats. Has pruritus on arm

## 2024-05-01 ENCOUNTER — NON-APPOINTMENT (OUTPATIENT)
Age: 84
End: 2024-05-01

## 2024-05-01 LAB
25(OH)D3 SERPL-MCNC: 63.5 NG/ML
ALBUMIN SERPL ELPH-MCNC: 4.2 G/DL
ALP BLD-CCNC: 76 U/L
ALT SERPL-CCNC: 33 U/L
ANION GAP SERPL CALC-SCNC: 14 MMOL/L
APPEARANCE: CLEAR
AST SERPL-CCNC: 26 U/L
BASOPHILS # BLD AUTO: 0.05 K/UL
BASOPHILS NFR BLD AUTO: 0.7 %
BILIRUB SERPL-MCNC: 0.6 MG/DL
BILIRUBIN URINE: NEGATIVE
BLOOD URINE: NEGATIVE
BUN SERPL-MCNC: 80 MG/DL
CALCIUM SERPL-MCNC: 9.7 MG/DL
CHLORIDE SERPL-SCNC: 97 MMOL/L
CHOLEST SERPL-MCNC: 84 MG/DL
CO2 SERPL-SCNC: 31 MMOL/L
COLOR: YELLOW
CREAT SERPL-MCNC: 2.45 MG/DL
EGFR: 26 ML/MIN/1.73M2
EOSINOPHIL # BLD AUTO: 0.27 K/UL
EOSINOPHIL NFR BLD AUTO: 3.9 %
ESTIMATED AVERAGE GLUCOSE: 151 MG/DL
FERRITIN SERPL-MCNC: 156 NG/ML
GLUCOSE QUALITATIVE U: NEGATIVE MG/DL
GLUCOSE SERPL-MCNC: 151 MG/DL
HBA1C MFR BLD HPLC: 6.9 %
HCT VFR BLD CALC: 35.1 %
HDLC SERPL-MCNC: 33 MG/DL
HGB BLD-MCNC: 10.9 G/DL
IMM GRANULOCYTES NFR BLD AUTO: 0.3 %
KETONES URINE: NEGATIVE MG/DL
LDLC SERPL CALC-MCNC: 30 MG/DL
LEUKOCYTE ESTERASE URINE: ABNORMAL
LYMPHOCYTES # BLD AUTO: 1.28 K/UL
LYMPHOCYTES NFR BLD AUTO: 18.6 %
MAN DIFF?: NORMAL
MCHC RBC-ENTMCNC: 29.4 PG
MCHC RBC-ENTMCNC: 31.1 GM/DL
MCV RBC AUTO: 94.6 FL
MONOCYTES # BLD AUTO: 0.57 K/UL
MONOCYTES NFR BLD AUTO: 8.3 %
NEUTROPHILS # BLD AUTO: 4.71 K/UL
NEUTROPHILS NFR BLD AUTO: 68.2 %
NITRITE URINE: NEGATIVE
NONHDLC SERPL-MCNC: 52 MG/DL
NT-PROBNP SERPL-MCNC: 7151 PG/ML
PH URINE: 5.5
PLATELET # BLD AUTO: 131 K/UL
POTASSIUM SERPL-SCNC: 3.3 MMOL/L
PROT SERPL-MCNC: 6.9 G/DL
PROTEIN URINE: NEGATIVE MG/DL
RBC # BLD: 3.71 M/UL
RBC # FLD: 16.1 %
SODIUM SERPL-SCNC: 142 MMOL/L
SPECIFIC GRAVITY URINE: 1.01
TRIGL SERPL-MCNC: 120 MG/DL
TSH SERPL-ACNC: 2.5 UIU/ML
UROBILINOGEN URINE: 0.2 MG/DL
VIT B12 SERPL-MCNC: 520 PG/ML
WBC # FLD AUTO: 6.9 K/UL

## 2024-05-06 NOTE — BRIEF OPERATIVE NOTE - ASSISTANT(S)
Addison Thornton PGY2; Troy Rowe PA-C [Alert] : alert [Normocephalic] : normocephalic [Flat Open Anterior [place]] : flat open anterior fontanelle [Red Reflex] : red reflex bilateral [PERRL] : PERRL [Normally Placed Ears] : normally placed ears [Nares Patent] : nares patent [Palate Intact] : palate intact [Uvula Midline] : uvula midline [Supple, full passive range of motion] : supple, full passive range of motion [Symmetric Chest Rise] : symmetric chest rise [Clear to Auscultation Bilaterally] : clear to auscultation bilaterally [Regular Rate and Rhythm] : regular rate and rhythm [S1, S2 present] : S1, S2 present [+2 Femoral Pulses] : (+) 2 femoral pulses [Soft] : soft [Bowel Sounds] : bowel sounds present [Normal External Genitalia] : normal external genitalia [Normal Vaginal Introitus] : normal vaginal introitus [No Abnormal Lymph Nodes Palpated] : no abnormal lymph nodes palpated [Symmetric abduction and rotation of hips] : symmetric abduction and rotation of hips [Straight] : straight [Cranial Nerves Grossly Intact] : cranial nerves grossly intact [Acute Distress] : no acute distress [Excessive Tearing] : no excessive tearing [Discharge] : no discharge [Palpable Masses] : no palpable masses [Murmurs] : no murmurs [Tender] : nontender [Distended] : nondistended [Hepatomegaly] : no hepatomegaly [Splenomegaly] : no splenomegaly [Clitoromegaly] : no clitoromegaly [Allis Sign] : negative Allis sign [Rash or Lesions] : no rash/lesions [de-identified] : cerumen impaction b/l

## 2024-05-20 ENCOUNTER — RX RENEWAL (OUTPATIENT)
Age: 84
End: 2024-05-20

## 2024-05-20 RX ORDER — BUMETANIDE 1 MG/1
1 TABLET ORAL
Qty: 174 | Refills: 2 | Status: ACTIVE | COMMUNITY
Start: 2023-05-17 | End: 1900-01-01

## 2024-06-19 ENCOUNTER — APPOINTMENT (OUTPATIENT)
Dept: INTERNAL MEDICINE | Facility: CLINIC | Age: 84
End: 2024-06-19
Payer: MEDICARE

## 2024-06-19 ENCOUNTER — APPOINTMENT (OUTPATIENT)
Dept: PULMONOLOGY | Facility: CLINIC | Age: 84
End: 2024-06-19

## 2024-06-19 VITALS
TEMPERATURE: 97.9 F | WEIGHT: 162 LBS | SYSTOLIC BLOOD PRESSURE: 114 MMHG | HEART RATE: 66 BPM | HEIGHT: 66 IN | DIASTOLIC BLOOD PRESSURE: 64 MMHG | OXYGEN SATURATION: 94 % | BODY MASS INDEX: 26.03 KG/M2

## 2024-06-19 VITALS
DIASTOLIC BLOOD PRESSURE: 60 MMHG | SYSTOLIC BLOOD PRESSURE: 116 MMHG | RESPIRATION RATE: 12 BRPM | HEIGHT: 66 IN | OXYGEN SATURATION: 93 % | HEART RATE: 72 BPM | BODY MASS INDEX: 26.03 KG/M2 | WEIGHT: 162 LBS

## 2024-06-19 DIAGNOSIS — Z87.09 PERSONAL HISTORY OF OTHER DISEASES OF THE RESPIRATORY SYSTEM: ICD-10-CM

## 2024-06-19 DIAGNOSIS — G47.33 OBSTRUCTIVE SLEEP APNEA (ADULT) (PEDIATRIC): ICD-10-CM

## 2024-06-19 PROCEDURE — 99213 OFFICE O/P EST LOW 20 MIN: CPT

## 2024-06-19 PROCEDURE — G2211 COMPLEX E/M VISIT ADD ON: CPT

## 2024-06-19 NOTE — HEALTH RISK ASSESSMENT
[No] : In the past 12 months have you used drugs other than those required for medical reasons? No [de-identified] : CARD, PULM, PT

## 2024-06-19 NOTE — HISTORY OF PRESENT ILLNESS
[FreeTextEntry1] : Pt presents for a follow-up.  [de-identified] : Pt is a 84 yr old male present today for a follow-up visit.   Patient is doing well overall yet has limited mobility. Pt reports not being able to walk long distances. Requires form for [facility] services Does have shortness of breath on exertion. Denies any abdominal pain, urinary symptom, or change in bowel habits. Denies any fever, chills, or night sweats.

## 2024-06-19 NOTE — ADDENDUM
[FreeTextEntry1] : I, Caryl Horner, acted as a scribe on behalf of Dr. Catalino Lara MD, on 06/19/2024.   All medical entries made by the scribe were at my, Dr. Catalino Lara MD, direction and personally dictated by me on 06/19/2024. I have reviewed the chart and agree that the record accurately reflects my personal performance of the history, physical exam, assessment and plan. I have also personally directed, reviewed, and agreed with the chart.

## 2024-06-19 NOTE — PHYSICAL EXAM
[No Acute Distress] : no acute distress [Well Nourished] : well nourished [Well Developed] : well developed [Well-Appearing] : well-appearing [Normal Posterior Cervical Nodes] : no posterior cervical lymphadenopathy [No Spinal Tenderness] : no spinal tenderness [Grossly Normal Strength/Tone] : grossly normal strength/tone

## 2024-06-19 NOTE — ASSESSMENT
[FreeTextEntry1] : Follow-up Visit - Home care form completion -Forms completed-patient requires assistance with ADL's given neuropathic pain in legs, heart failure

## 2024-06-24 PROBLEM — G47.33 OSA ON CPAP: Status: ACTIVE | Noted: 2021-03-11

## 2024-07-01 ENCOUNTER — RX RENEWAL (OUTPATIENT)
Age: 84
End: 2024-07-01

## 2024-07-11 ENCOUNTER — APPOINTMENT (OUTPATIENT)
Dept: CARDIOLOGY | Facility: CLINIC | Age: 84
End: 2024-07-11
Payer: MEDICARE

## 2024-07-11 ENCOUNTER — NON-APPOINTMENT (OUTPATIENT)
Age: 84
End: 2024-07-11

## 2024-07-11 VITALS
WEIGHT: 162 LBS | BODY MASS INDEX: 26.03 KG/M2 | RESPIRATION RATE: 12 BRPM | HEART RATE: 61 BPM | DIASTOLIC BLOOD PRESSURE: 60 MMHG | SYSTOLIC BLOOD PRESSURE: 98 MMHG | HEIGHT: 66 IN | OXYGEN SATURATION: 93 % | TEMPERATURE: 99.9 F

## 2024-07-11 DIAGNOSIS — N18.4 CHRONIC KIDNEY DISEASE, STAGE 4 (SEVERE): ICD-10-CM

## 2024-07-11 DIAGNOSIS — I10 ESSENTIAL (PRIMARY) HYPERTENSION: ICD-10-CM

## 2024-07-11 DIAGNOSIS — I48.20 CHRONIC ATRIAL FIBRILLATION, UNSP: ICD-10-CM

## 2024-07-11 DIAGNOSIS — R60.0 LOCALIZED EDEMA: ICD-10-CM

## 2024-07-11 PROCEDURE — 93000 ELECTROCARDIOGRAM COMPLETE: CPT

## 2024-07-11 PROCEDURE — 99214 OFFICE O/P EST MOD 30 MIN: CPT

## 2024-07-11 PROCEDURE — G2211 COMPLEX E/M VISIT ADD ON: CPT

## 2024-07-12 ENCOUNTER — RX RENEWAL (OUTPATIENT)
Age: 84
End: 2024-07-12

## 2024-07-12 LAB
ANION GAP SERPL CALC-SCNC: 19 MMOL/L
BUN SERPL-MCNC: 95 MG/DL
CALCIUM SERPL-MCNC: 9.2 MG/DL
CHLORIDE SERPL-SCNC: 92 MMOL/L
CO2 SERPL-SCNC: 28 MMOL/L
CREAT SERPL-MCNC: 2.8 MG/DL
EGFR: 22 ML/MIN/1.73M2
GLUCOSE SERPL-MCNC: 121 MG/DL
HCT VFR BLD CALC: 35.7 %
HGB BLD-MCNC: 10.8 G/DL
MCHC RBC-ENTMCNC: 30.3 GM/DL
MCV RBC AUTO: 93.9 FL
NT-PROBNP SERPL-MCNC: 9774 PG/ML
PLATELET # BLD AUTO: 115 K/UL
POTASSIUM SERPL-SCNC: 3.4 MMOL/L
RBC # BLD: 3.8 M/UL
RBC # FLD: 15.4 %
SODIUM SERPL-SCNC: 138 MMOL/L
WBC # FLD AUTO: 7.46 K/UL

## 2024-07-22 ENCOUNTER — RX RENEWAL (OUTPATIENT)
Age: 84
End: 2024-07-22

## 2024-08-09 ENCOUNTER — APPOINTMENT (OUTPATIENT)
Dept: INTERNAL MEDICINE | Facility: CLINIC | Age: 84
End: 2024-08-09

## 2024-08-09 ENCOUNTER — APPOINTMENT (OUTPATIENT)
Dept: RADIOLOGY | Facility: CLINIC | Age: 84
End: 2024-08-09

## 2024-08-09 ENCOUNTER — OUTPATIENT (OUTPATIENT)
Dept: OUTPATIENT SERVICES | Facility: HOSPITAL | Age: 84
LOS: 1 days | End: 2024-08-09
Payer: MEDICARE

## 2024-08-09 DIAGNOSIS — R04.2 HEMOPTYSIS: ICD-10-CM

## 2024-08-09 PROCEDURE — 99214 OFFICE O/P EST MOD 30 MIN: CPT

## 2024-08-09 PROCEDURE — 71046 X-RAY EXAM CHEST 2 VIEWS: CPT | Mod: 26

## 2024-08-09 PROCEDURE — 71046 X-RAY EXAM CHEST 2 VIEWS: CPT

## 2024-08-09 PROCEDURE — G2211 COMPLEX E/M VISIT ADD ON: CPT

## 2024-08-12 NOTE — PHYSICAL EXAM
[Normal] : normal rate, regular rhythm, normal S1 and S2 and no murmur heard [de-identified] : Dry scab anterior nose [de-identified] : Venous stasis changes, No pitting edema appreciated

## 2024-08-12 NOTE — ASSESSMENT
[FreeTextEntry1] : Hemoptysis may have been from local irritation no signs of CHF fluid overload, x-ray ordered.  Up may have also been secondary to epistaxis did advise saline solution, advised to use BiPAP discussed risks of decreasing cardiac function increase stress on the heart. X-ray ordered to assess for any lung pathology.

## 2024-08-12 NOTE — HISTORY OF PRESENT ILLNESS
[FreeTextEntry8] : Patient presents to the office has been having a cough for 3 weeks has been dry.  Had a nosebleed from the right nostril.  Did not use BiPAP last night.  Denies any wheezing shortness of breath.  In the sputum noticed dried blood.  Denies any nosebleed in the back of the throat denies any wheezing chest pain lower extremity edema.  Has been following with cardiology routinely.  Denies any orthopnea or paroxysmal nocturnal dyspnea.

## 2024-08-13 NOTE — END OF VISIT
[Time Spent: ___ minutes] : I have spent [unfilled] minutes of time on the encounter. 81M PMH bladder cancer, GERD, L knee OA, chronic lymphedema, kyphoscoliosis s/p C-sacrum fusion, MDD, panic disorder, prior RUE DVT, BPH admitted for deconditioning and fall.  - ptn old home health aide quit, ptn chairbound over the weekend w no home help, fell on L elbow, imaging no acute findings to intervene on  - bcx w GPC in clusters in aerobic bottle, given ptn no leukocytosis or fevers, vital signs stable and no clinical appearance of acute infection, likely contaminant, no antibiotics, will await speciation  - intention tremor on exam, given frequent falls and deconditioning, neuro consulted, likely can pursue outpatient workup  - PT eval rec for [facility]EN[facility], if ptn amenable, otherwise home w new home health aide 81M PMH bladder cancer, GERD, L knee OA, chronic lymphedema, kyphoscoliosis s/p C-sacrum fusion, MDD, panic disorder, prior RUE DVT, BPH admitted for deconditioning and fall.  - ptn old home health aide quit, ptn chairbound over the weekend w no home help, fell on L elbow, imaging no acute findings to intervene on  - bcx w GPC in clusters speciated to MSSA, ID consulted, starting ancef 2g q8, will get daily blood cultures until clear, RAJANI ordered per ID  - intention tremor on exam, given frequent falls and deconditioning, neuro consulted, likely can pursue outpatient workup  - PT eval rec for [facility]ENNA, if ptn amenable, otherwise home w new home health aide 81M PMH bladder cancer, GERD, L knee OA, chronic lymphedema, kyphoscoliosis s/p C-sacrum fusion, MDD, panic disorder, prior RUE DVT, BPH admitted for deconditioning and fall.  - ptn old home health aide quit, ptn chairbound over the weekend w no home help, fell on L elbow, imaging no acute findings to intervene on  - bcx w GPC in clusters speciated to MSSA, ID consulted, starting ancef 2g q8, will get daily blood cultures until clear, RAJANI ordered per ID  - intention tremor on exam, given frequent falls and deconditioning, neuro consulted, likely can pursue outpatient workup  - per team discussion w ptn, ptn wishes to be DNR DNI  - PT eval rec for [facility]CKENNA, if ptn amenable, otherwise home w new home health aide

## 2024-08-20 ENCOUNTER — RX RENEWAL (OUTPATIENT)
Age: 84
End: 2024-08-20

## 2024-09-05 NOTE — H&P PST ADULT - DOES THIS PATIENT HAVE A HISTORY OF OR HAS BEEN DX WITH HEART FAILURE?
yes Expected Date Of Service: 09/05/2024 Performing Laboratory: [id number] Billing Type: Third-Party Bill Lab Facility: 0 Bill For Surgical Tray: no

## 2024-09-18 ENCOUNTER — APPOINTMENT (OUTPATIENT)
Dept: PULMONOLOGY | Facility: CLINIC | Age: 84
End: 2024-09-18
Payer: MEDICARE

## 2024-09-18 VITALS
TEMPERATURE: 97.2 F | RESPIRATION RATE: 12 BRPM | DIASTOLIC BLOOD PRESSURE: 65 MMHG | WEIGHT: 165 LBS | OXYGEN SATURATION: 96 % | HEIGHT: 66 IN | BODY MASS INDEX: 26.52 KG/M2 | SYSTOLIC BLOOD PRESSURE: 117 MMHG | HEART RATE: 58 BPM

## 2024-09-18 DIAGNOSIS — G47.33 OBSTRUCTIVE SLEEP APNEA (ADULT) (PEDIATRIC): ICD-10-CM

## 2024-09-18 DIAGNOSIS — N18.9 CHRONIC KIDNEY DISEASE, UNSPECIFIED: ICD-10-CM

## 2024-09-18 DIAGNOSIS — J44.9 CHRONIC OBSTRUCTIVE PULMONARY DISEASE, UNSPECIFIED: ICD-10-CM

## 2024-09-18 DIAGNOSIS — I10 ESSENTIAL (PRIMARY) HYPERTENSION: ICD-10-CM

## 2024-09-18 DIAGNOSIS — I48.20 CHRONIC ATRIAL FIBRILLATION, UNSP: ICD-10-CM

## 2024-09-18 PROCEDURE — 99213 OFFICE O/P EST LOW 20 MIN: CPT | Mod: 25

## 2024-09-18 PROCEDURE — G0008: CPT

## 2024-09-18 PROCEDURE — 90662 IIV NO PRSV INCREASED AG IM: CPT

## 2024-09-18 NOTE — HISTORY OF PRESENT ILLNESS
[TextBox_4] : Mr. NICOLLE COBB is a 84 year old male with past medical history significant for MERCY/CKD, Pyelonephritis, HTN, AFib, COPD, HLD, RODRIGO on CPAP

## 2024-09-18 NOTE — ASSESSMENT
[FreeTextEntry1] : Mr. NICOLLE COBB is a 84 year old male with past medical history significant for MERCY/CKD, Pyelonephritis, HTN, AFib, COPD, HLD, RODRIGO on CPAP. Present in baseline state of health. utilizes CPAP nightly, about 7 hours. No new complaints, not on inhalers.  On exam, lungs are clear, no wheezing or rhonchi, in NAD.  Care plans discussed - will continue nightly CPAP, follow-up in 6 months.

## 2024-10-01 ENCOUNTER — APPOINTMENT (OUTPATIENT)
Dept: INTERNAL MEDICINE | Facility: CLINIC | Age: 84
End: 2024-10-01
Payer: MEDICARE

## 2024-10-01 VITALS
OXYGEN SATURATION: 99 % | HEART RATE: 61 BPM | BODY MASS INDEX: 26.03 KG/M2 | TEMPERATURE: 97.7 F | DIASTOLIC BLOOD PRESSURE: 55 MMHG | SYSTOLIC BLOOD PRESSURE: 94 MMHG | WEIGHT: 162 LBS | HEIGHT: 66 IN

## 2024-10-01 DIAGNOSIS — L03.90 CELLULITIS, UNSPECIFIED: ICD-10-CM

## 2024-10-01 PROCEDURE — 99213 OFFICE O/P EST LOW 20 MIN: CPT

## 2024-10-01 PROCEDURE — G2211 COMPLEX E/M VISIT ADD ON: CPT

## 2024-10-01 RX ORDER — CEPHALEXIN 500 MG/1
500 TABLET ORAL 3 TIMES DAILY
Qty: 21 | Refills: 0 | Status: ACTIVE | COMMUNITY
Start: 2024-10-01 | End: 1900-01-01

## 2024-10-04 NOTE — HISTORY OF PRESENT ILLNESS
[FreeTextEntry1] : Patient presents for a follow-up visit.  [de-identified] : Patient is a 84 yr old male present today for a follow-up visit.  Did have a wound which burst was retaining water.  Did increase diuretics and swelling went down.  Has slight tenderness in the wound.  Has not been improving denies any fevers or chills.

## 2024-10-04 NOTE — PHYSICAL EXAM
[Normal] : soft, non-tender, non-distended, no masses palpated, no HSM and normal bowel sounds [de-identified] : Wound 6 x 6 cm seen Granulation tissue and some slough seen slight tenderness to palpation.

## 2024-10-04 NOTE — HISTORY OF PRESENT ILLNESS
[FreeTextEntry1] : Patient presents for a follow-up visit.  [de-identified] : Patient is a 84 yr old male present today for a follow-up visit.  Did have a wound which burst was retaining water.  Did increase diuretics and swelling went down.  Has slight tenderness in the wound.  Has not been improving denies any fevers or chills.

## 2024-10-04 NOTE — PHYSICAL EXAM
[Normal] : soft, non-tender, non-distended, no masses palpated, no HSM and normal bowel sounds [de-identified] : Wound 6 x 6 cm seen Granulation tissue and some slough seen slight tenderness to palpation.

## 2024-10-04 NOTE — ASSESSMENT
[FreeTextEntry1] : Follow-up Visit: wound cellulitis - Rx Cephalexin 500mg, Medihoney external gel -If no improvement would need to see wound care   - RTO in 3 months    Pt verbalized understanding and will reach should any questions/concerns occur.

## 2024-10-04 NOTE — ADDENDUM
[FreeTextEntry1] : I, Caryl Horner, acted as a scribe on behalf of Dr. Catalino Lara MD, on 10/01/2024.   All medical entries made by the scribe were at my, Dr. Catalino Lara MD, direction and personally dictated by me on 10/01/2024. I have reviewed the chart and agree that the record accurately reflects my personal performance of the history, physical exam, assessment and plan. I have also personally directed, reviewed, and agreed with the chart.

## 2024-10-10 NOTE — PRE-OP CHECKLIST - HAND OFF
Physical Therapy Visit        SUBJECTIVE                                                                                                               Patient states her blood pressure was low and was directed by physician not to take blood pressure medication today. BP taken today 155/77 before exercises.       OBJECTIVE                                                                                                                           Treatment     Therapeutic Exercise  Seated chest press 5# bar 3x10  Seated arm raises with 5# bar 3x10  Seated arm curls 5# bar 3x10  Seated shoulder extension with 4# bar 3x10  Seated long arc quad 2# ankle weight 3x10  Seated heel and toe raises 2# ankle weight 3x10   Seated marching 2# ankle weight 3min  Standing hip 4 way 2# ankle weight 3x10 each   Seated shoulder shrugs 3x10  Seated scapular retractions with red band 3x10     Manual Therapy   STM to cervical spine for pain  Seated PROM in flexion bilateral shoulders   Shoulder oscillations (scaption and flexion) in sitting bilateral shoulders       Skilled input: verbal instruction/cues, tactile instruction/cues, posture correction and facilitation    Writer verbally educated and received verbal consent for hand placement, positioning of patient, and techniques to be performed today from patient for clothing adjustments for techniques, hand placement and palpation for techniques and therapist position for techniques as described above and how they are pertinent to the patient's plan of care.  Home Exercise Program  Access Code: [email or web address]  URL: [email or web address]  Date: 10/10/2024  Prepared by: Ranulfo Barnett    Exercises  - Standing Hip Abduction with Counter Support  - 1 x daily - 7 x weekly - 3 sets - 10 reps  - Standing March with Counter Support  - 1 x daily - 7 x weekly - 3 sets - 10 reps  - Standing Hip Extension with Counter Support  - 1 x daily - 7 x weekly - 3 sets - 10 reps  - Seated  Toe Raise  - 1 x daily - 7 x weekly - 3 sets - 10 reps  - Seated Heel Toe Raises  - 1 x daily - 7 x weekly - 3 sets - 10 reps  - Seated Long Arc Quad  - 1 x daily - 7 x weekly - 3 sets - 10 reps  - Seated Shoulder Abduction - Palms Down  - 1 x daily - 7 x weekly - 3 sets - 10 reps  - Seated Shoulder Shrugs  - 1 x daily - 7 x weekly - 3 sets - 10 reps  - Seated Shoulder Flexion  - 1 x daily - 7 x weekly - 3 sets - 10 reps  - Seated Scapular Retraction  - 1 x daily - 7 x weekly - 3 sets - 10 reps      ASSESSMENT                                                                                                            Patient tolerated session well. Patient states 5/10 in right hip pain and low back felt at end of session. Patient functional limitations in balance, lower extremity range of motion and strength, and pain still persist. Patient ambulates with rollator walker and forward head and lurched gait that was corrected with cues. Patient is improving in range of motion and strength and is able to tolerate progression in exercises today (lower extremity weighted exercises) and shoulder strengthening without increase in symptoms or discomfort. Patient required extensive verbal and tactile cues for exercise technique and posture correction with exercises. Patient HEP was updated and was given a handout, patient verbalized understanding. Patient will benefit from continued skilled physical therapy services to improve range of motion, flexibility/mobility, and strength to return to leisure activities and improve overall function.   Pain/symptoms after session (out of 10): 5  Education:   - Results of above outlined education: Verbalizes understanding and Demonstrates understanding    PLAN                                                                                                                           Suggestions for next session as indicated: Progress per plan of care       Therapy procedure time and total  treatment time can be found documented on the Time Entry flowsheet     Unit RN to OR RN

## 2024-10-17 ENCOUNTER — APPOINTMENT (OUTPATIENT)
Dept: CARDIOLOGY | Facility: CLINIC | Age: 84
End: 2024-10-17
Payer: MEDICARE

## 2024-10-17 ENCOUNTER — NON-APPOINTMENT (OUTPATIENT)
Age: 84
End: 2024-10-17

## 2024-10-17 VITALS
HEART RATE: 58 BPM | SYSTOLIC BLOOD PRESSURE: 120 MMHG | DIASTOLIC BLOOD PRESSURE: 70 MMHG | OXYGEN SATURATION: 94 % | HEIGHT: 66 IN | BODY MASS INDEX: 25.71 KG/M2 | WEIGHT: 160 LBS | RESPIRATION RATE: 12 BRPM

## 2024-10-17 DIAGNOSIS — N18.9 CHRONIC KIDNEY DISEASE, UNSPECIFIED: ICD-10-CM

## 2024-10-17 DIAGNOSIS — I10 ESSENTIAL (PRIMARY) HYPERTENSION: ICD-10-CM

## 2024-10-17 PROCEDURE — G2211 COMPLEX E/M VISIT ADD ON: CPT

## 2024-10-17 PROCEDURE — 93000 ELECTROCARDIOGRAM COMPLETE: CPT

## 2024-10-17 PROCEDURE — 99214 OFFICE O/P EST MOD 30 MIN: CPT

## 2024-10-18 LAB
ANION GAP SERPL CALC-SCNC: 15 MMOL/L
BUN SERPL-MCNC: 101 MG/DL
CALCIUM SERPL-MCNC: 9.5 MG/DL
CHLORIDE SERPL-SCNC: 95 MMOL/L
CO2 SERPL-SCNC: 32 MMOL/L
CREAT SERPL-MCNC: 2.52 MG/DL
EGFR: 25 ML/MIN/1.73M2
GLUCOSE SERPL-MCNC: 124 MG/DL
NT-PROBNP SERPL-MCNC: 6523 PG/ML
POTASSIUM SERPL-SCNC: 4.2 MMOL/L
SODIUM SERPL-SCNC: 143 MMOL/L

## 2024-10-18 RX ORDER — BUMETANIDE 1 MG/1
1 TABLET ORAL DAILY
Qty: 90 | Refills: 1 | Status: ACTIVE | COMMUNITY
Start: 2024-10-17 | End: 1900-01-01

## 2024-11-03 NOTE — ED PROVIDER NOTE - PSH
Patient with a history of CAD s/p PCI to RCA in 2004 who presented to the hospital for an outpatient LHC due to progressive LOJA over the last 2-3 years.  Found to have multivessel CAD as mentioned below.  Patient admitted for CABG evaluation    LHC findings:    Dist LM lesion is 80% stenosed.    Prox Cx to Mid Cx lesion is 90% stenosed.    Mid RCA lesion is 90% stenosed.    Echo 10/31/24:  EF 55% with G1DD. Mild concentric hypertrophy. Hypokinetic in basal inferolateral and mid inferolateral segments. Trace TR and mildly dilated ascending aorta 3.8 cm.     VAS vein mapping: patent great saphenous veins in lower extremities.    Plan:  - Cardiothoracic surgery consulted with CABG planned on 11/6/24  - No plans for pacemaker placement post CABG due to bradycardia   - Continue to monitor on telemetry  - Continue to hold Xarelto (for DVT)   - Continue Heparin gtt   - Continue Lipitor 40 mg daily  - Low threshold for serial EKG/troponins if necessary  - PRN nitro sublingual for chest pain  - Avoid AV salvador blocking agents given symptomatic bradycardia    Bilateral Inguinal Hernia (BIH)    Bladder cancer

## 2024-11-23 ENCOUNTER — RX RENEWAL (OUTPATIENT)
Age: 84
End: 2024-11-23

## 2024-11-27 ENCOUNTER — RX RENEWAL (OUTPATIENT)
Age: 84
End: 2024-11-27

## 2024-12-12 ENCOUNTER — APPOINTMENT (OUTPATIENT)
Dept: CARDIOLOGY | Facility: CLINIC | Age: 84
End: 2024-12-12
Payer: MEDICARE

## 2024-12-12 ENCOUNTER — NON-APPOINTMENT (OUTPATIENT)
Age: 84
End: 2024-12-12

## 2024-12-12 VITALS
SYSTOLIC BLOOD PRESSURE: 153 MMHG | HEIGHT: 66 IN | BODY MASS INDEX: 27.16 KG/M2 | WEIGHT: 169 LBS | OXYGEN SATURATION: 96 % | DIASTOLIC BLOOD PRESSURE: 79 MMHG | HEART RATE: 60 BPM | TEMPERATURE: 97.2 F | RESPIRATION RATE: 12 BRPM

## 2024-12-12 DIAGNOSIS — N18.4 CHRONIC KIDNEY DISEASE, STAGE 4 (SEVERE): ICD-10-CM

## 2024-12-12 DIAGNOSIS — I83.93 ASYMPTOMATIC VARICOSE VEINS OF BILATERAL LOWER EXTREMITIES: ICD-10-CM

## 2024-12-12 DIAGNOSIS — I10 ESSENTIAL (PRIMARY) HYPERTENSION: ICD-10-CM

## 2024-12-12 DIAGNOSIS — I48.20 CHRONIC ATRIAL FIBRILLATION, UNSP: ICD-10-CM

## 2024-12-12 PROCEDURE — 93000 ELECTROCARDIOGRAM COMPLETE: CPT

## 2024-12-12 PROCEDURE — G2211 COMPLEX E/M VISIT ADD ON: CPT

## 2024-12-12 PROCEDURE — 99214 OFFICE O/P EST MOD 30 MIN: CPT

## 2024-12-13 LAB
ANION GAP SERPL CALC-SCNC: 14 MMOL/L
BUN SERPL-MCNC: 103 MG/DL
CALCIUM SERPL-MCNC: 9.5 MG/DL
CHLORIDE SERPL-SCNC: 95 MMOL/L
CO2 SERPL-SCNC: 33 MMOL/L
CREAT SERPL-MCNC: 2.67 MG/DL
EGFR: 23 ML/MIN/1.73M2
GLUCOSE SERPL-MCNC: 133 MG/DL
NT-PROBNP SERPL-MCNC: 5911 PG/ML
POTASSIUM SERPL-SCNC: 4.1 MMOL/L
SODIUM SERPL-SCNC: 142 MMOL/L

## 2025-01-14 ENCOUNTER — RX RENEWAL (OUTPATIENT)
Age: 85
End: 2025-01-14

## 2025-01-15 ENCOUNTER — APPOINTMENT (OUTPATIENT)
Dept: PULMONOLOGY | Facility: CLINIC | Age: 85
End: 2025-01-15
Payer: MEDICARE

## 2025-01-15 VITALS
SYSTOLIC BLOOD PRESSURE: 96 MMHG | TEMPERATURE: 97.5 F | HEART RATE: 69 BPM | HEIGHT: 66 IN | OXYGEN SATURATION: 95 % | RESPIRATION RATE: 12 BRPM | WEIGHT: 169 LBS | BODY MASS INDEX: 27.16 KG/M2 | DIASTOLIC BLOOD PRESSURE: 62 MMHG

## 2025-01-15 DIAGNOSIS — G47.33 OBSTRUCTIVE SLEEP APNEA (ADULT) (PEDIATRIC): ICD-10-CM

## 2025-01-15 DIAGNOSIS — J44.9 CHRONIC OBSTRUCTIVE PULMONARY DISEASE, UNSPECIFIED: ICD-10-CM

## 2025-01-15 PROCEDURE — 99212 OFFICE O/P EST SF 10 MIN: CPT

## 2025-01-28 ENCOUNTER — RX RENEWAL (OUTPATIENT)
Age: 85
End: 2025-01-28

## 2025-02-03 ENCOUNTER — APPOINTMENT (OUTPATIENT)
Dept: CARDIOLOGY | Facility: CLINIC | Age: 85
End: 2025-02-03
Payer: MEDICARE

## 2025-02-03 ENCOUNTER — NON-APPOINTMENT (OUTPATIENT)
Age: 85
End: 2025-02-03

## 2025-02-03 VITALS
RESPIRATION RATE: 19 BRPM | BODY MASS INDEX: 27.16 KG/M2 | WEIGHT: 169 LBS | HEIGHT: 66 IN | SYSTOLIC BLOOD PRESSURE: 108 MMHG | OXYGEN SATURATION: 97 % | DIASTOLIC BLOOD PRESSURE: 64 MMHG | HEART RATE: 74 BPM

## 2025-02-03 DIAGNOSIS — M79.89 OTHER SPECIFIED SOFT TISSUE DISORDERS: ICD-10-CM

## 2025-02-03 DIAGNOSIS — E78.2 MIXED HYPERLIPIDEMIA: ICD-10-CM

## 2025-02-03 PROCEDURE — 99214 OFFICE O/P EST MOD 30 MIN: CPT

## 2025-02-04 ENCOUNTER — RX RENEWAL (OUTPATIENT)
Age: 85
End: 2025-02-04

## 2025-02-28 ENCOUNTER — APPOINTMENT (OUTPATIENT)
Dept: CARDIOLOGY | Facility: CLINIC | Age: 85
End: 2025-02-28
Payer: MEDICARE

## 2025-02-28 ENCOUNTER — RX RENEWAL (OUTPATIENT)
Age: 85
End: 2025-02-28

## 2025-02-28 PROCEDURE — 93925 LOWER EXTREMITY STUDY: CPT

## 2025-03-07 ENCOUNTER — APPOINTMENT (OUTPATIENT)
Dept: INTERNAL MEDICINE | Facility: CLINIC | Age: 85
End: 2025-03-07

## 2025-03-24 ENCOUNTER — APPOINTMENT (OUTPATIENT)
Dept: CARDIOLOGY | Facility: CLINIC | Age: 85
End: 2025-03-24

## 2025-03-24 ENCOUNTER — NON-APPOINTMENT (OUTPATIENT)
Age: 85
End: 2025-03-24

## 2025-03-24 VITALS
OXYGEN SATURATION: 94 % | BODY MASS INDEX: 25.71 KG/M2 | TEMPERATURE: 96.6 F | RESPIRATION RATE: 18 BRPM | HEART RATE: 66 BPM | DIASTOLIC BLOOD PRESSURE: 64 MMHG | SYSTOLIC BLOOD PRESSURE: 109 MMHG | WEIGHT: 160 LBS | HEIGHT: 66 IN

## 2025-03-24 DIAGNOSIS — N18.2 CHRONIC KIDNEY DISEASE, STAGE 2 (MILD): ICD-10-CM

## 2025-03-24 DIAGNOSIS — L03.90 CELLULITIS, UNSPECIFIED: ICD-10-CM

## 2025-03-24 PROCEDURE — 99214 OFFICE O/P EST MOD 30 MIN: CPT

## 2025-03-24 PROCEDURE — G2211 COMPLEX E/M VISIT ADD ON: CPT

## 2025-03-24 PROCEDURE — 99214 OFFICE O/P EST MOD 30 MIN: CPT | Mod: NC

## 2025-03-24 PROCEDURE — 93000 ELECTROCARDIOGRAM COMPLETE: CPT

## 2025-03-24 RX ORDER — CEPHALEXIN 500 MG/1
500 CAPSULE ORAL 3 TIMES DAILY
Qty: 21 | Refills: 0 | Status: ACTIVE | COMMUNITY
Start: 2025-03-24 | End: 1900-01-01

## 2025-03-25 LAB
ANION GAP SERPL CALC-SCNC: 16 MMOL/L
BUN SERPL-MCNC: 107 MG/DL
CALCIUM SERPL-MCNC: 9.7 MG/DL
CHLORIDE SERPL-SCNC: 91 MMOL/L
CO2 SERPL-SCNC: 32 MMOL/L
CREAT SERPL-MCNC: 2.41 MG/DL
EGFRCR SERPLBLD CKD-EPI 2021: 26 ML/MIN/1.73M2
GLUCOSE SERPL-MCNC: 141 MG/DL
NT-PROBNP SERPL-MCNC: 6317 PG/ML
POTASSIUM SERPL-SCNC: 3.9 MMOL/L
SODIUM SERPL-SCNC: 139 MMOL/L

## 2025-03-26 ENCOUNTER — APPOINTMENT (OUTPATIENT)
Dept: WOUND CARE | Facility: CLINIC | Age: 85
End: 2025-03-26
Payer: MEDICARE

## 2025-03-26 ENCOUNTER — OUTPATIENT (OUTPATIENT)
Dept: OUTPATIENT SERVICES | Facility: HOSPITAL | Age: 85
LOS: 1 days | End: 2025-03-26
Payer: MEDICARE

## 2025-03-26 VITALS
HEIGHT: 66 IN | WEIGHT: 160 LBS | SYSTOLIC BLOOD PRESSURE: 118 MMHG | HEART RATE: 63 BPM | DIASTOLIC BLOOD PRESSURE: 53 MMHG | BODY MASS INDEX: 25.71 KG/M2 | TEMPERATURE: 97.3 F

## 2025-03-26 DIAGNOSIS — I83.93 ASYMPTOMATIC VARICOSE VEINS OF BILATERAL LOWER EXTREMITIES: ICD-10-CM

## 2025-03-26 DIAGNOSIS — I73.9 PERIPHERAL VASCULAR DISEASE, UNSPECIFIED: ICD-10-CM

## 2025-03-26 DIAGNOSIS — S81.802A UNSPECIFIED OPEN WOUND, LEFT LOWER LEG, INITIAL ENCOUNTER: ICD-10-CM

## 2025-03-26 DIAGNOSIS — L89.303 PRESSURE ULCER OF UNSPECIFIED BUTTOCK, STAGE 3: ICD-10-CM

## 2025-03-26 DIAGNOSIS — C67.9 MALIGNANT NEOPLASM OF BLADDER, UNSPECIFIED: Chronic | ICD-10-CM

## 2025-03-26 PROCEDURE — 11042 DBRDMT SUBQ TIS 1ST 20SQCM/<: CPT

## 2025-03-26 PROCEDURE — G0463: CPT

## 2025-03-26 PROCEDURE — 99205 OFFICE O/P NEW HI 60 MIN: CPT | Mod: 25

## 2025-04-16 ENCOUNTER — APPOINTMENT (OUTPATIENT)
Dept: PULMONOLOGY | Facility: CLINIC | Age: 85
End: 2025-04-16

## 2025-04-17 ENCOUNTER — RX RENEWAL (OUTPATIENT)
Age: 85
End: 2025-04-17

## 2025-04-21 ENCOUNTER — APPOINTMENT (OUTPATIENT)
Dept: PULMONOLOGY | Facility: CLINIC | Age: 85
End: 2025-04-21
Payer: MEDICARE

## 2025-04-21 VITALS
WEIGHT: 160 LBS | BODY MASS INDEX: 25.71 KG/M2 | HEIGHT: 66 IN | SYSTOLIC BLOOD PRESSURE: 102 MMHG | RESPIRATION RATE: 22 BRPM | HEART RATE: 67 BPM | DIASTOLIC BLOOD PRESSURE: 64 MMHG | OXYGEN SATURATION: 94 %

## 2025-04-21 DIAGNOSIS — J44.9 CHRONIC OBSTRUCTIVE PULMONARY DISEASE, UNSPECIFIED: ICD-10-CM

## 2025-04-21 DIAGNOSIS — G47.33 OBSTRUCTIVE SLEEP APNEA (ADULT) (PEDIATRIC): ICD-10-CM

## 2025-04-21 PROCEDURE — 99212 OFFICE O/P EST SF 10 MIN: CPT

## 2025-04-25 ENCOUNTER — APPOINTMENT (OUTPATIENT)
Dept: WOUND CARE | Facility: HOSPITAL | Age: 85
End: 2025-04-25

## 2025-05-02 ENCOUNTER — RX RENEWAL (OUTPATIENT)
Age: 85
End: 2025-05-02

## 2025-05-02 NOTE — ASSESSMENT
[FreeTextEntry1] : 1) Left lower extremity ulcer: Possibly traumatic, has not been healing, possibly arterial venous insufficiency, advised to apply hydrocolloid dressing, if no improvement advised to see wound care. Check HgA1c\par 2) Afib: rate controlled, continue current management\par 3) lower extremity edema: suspect vascular insufficiency advised to see wound care/vascular\par 4) Chronic renal failure: check electrolytes and urine\par 5) HLD: check lipid tesfaye\par 6) GoutL check uric acid levels
yes

## 2025-05-19 ENCOUNTER — NON-APPOINTMENT (OUTPATIENT)
Age: 85
End: 2025-05-19

## 2025-05-19 ENCOUNTER — APPOINTMENT (OUTPATIENT)
Dept: CARDIOLOGY | Facility: CLINIC | Age: 85
End: 2025-05-19
Payer: MEDICARE

## 2025-05-19 VITALS
RESPIRATION RATE: 22 BRPM | BODY MASS INDEX: 25.71 KG/M2 | SYSTOLIC BLOOD PRESSURE: 97 MMHG | WEIGHT: 160 LBS | DIASTOLIC BLOOD PRESSURE: 60 MMHG | TEMPERATURE: 97.2 F | OXYGEN SATURATION: 94 % | HEART RATE: 67 BPM | HEIGHT: 66 IN

## 2025-05-19 DIAGNOSIS — I50.20 UNSPECIFIED SYSTOLIC (CONGESTIVE) HEART FAILURE: ICD-10-CM

## 2025-05-19 DIAGNOSIS — L03.115 CELLULITIS OF RIGHT LOWER LIMB: ICD-10-CM

## 2025-05-19 PROCEDURE — 93000 ELECTROCARDIOGRAM COMPLETE: CPT

## 2025-05-19 PROCEDURE — G2211 COMPLEX E/M VISIT ADD ON: CPT

## 2025-05-19 PROCEDURE — 99214 OFFICE O/P EST MOD 30 MIN: CPT

## 2025-05-20 LAB
ANION GAP SERPL CALC-SCNC: 16 MMOL/L
BUN SERPL-MCNC: 77 MG/DL
CALCIUM SERPL-MCNC: 9.3 MG/DL
CHLORIDE SERPL-SCNC: 94 MMOL/L
CO2 SERPL-SCNC: 32 MMOL/L
CREAT SERPL-MCNC: 2.61 MG/DL
EGFRCR SERPLBLD CKD-EPI 2021: 24 ML/MIN/1.73M2
GLUCOSE SERPL-MCNC: 115 MG/DL
NT-PROBNP SERPL-MCNC: 7087 PG/ML
POTASSIUM SERPL-SCNC: 4.2 MMOL/L
SODIUM SERPL-SCNC: 142 MMOL/L

## 2025-06-03 ENCOUNTER — APPOINTMENT (OUTPATIENT)
Dept: INTERNAL MEDICINE | Facility: CLINIC | Age: 85
End: 2025-06-03

## 2025-06-04 ENCOUNTER — RX RENEWAL (OUTPATIENT)
Age: 85
End: 2025-06-04

## 2025-06-24 DIAGNOSIS — I83.93 ASYMPTOMATIC VARICOSE VEINS OF BILATERAL LOWER EXTREMITIES: ICD-10-CM

## 2025-06-24 DIAGNOSIS — I73.9 PERIPHERAL VASCULAR DISEASE, UNSPECIFIED: ICD-10-CM
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
## 2025-06-24 DIAGNOSIS — L89.303 PRESSURE ULCER OF UNSPECIFIED BUTTOCK, STAGE 3: ICD-10-CM
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
## 2025-06-24 DIAGNOSIS — S81.802A UNSPECIFIED OPEN WOUND, LEFT LOWER LEG, INITIAL ENCOUNTER: ICD-10-CM

## 2025-06-30 ENCOUNTER — NON-APPOINTMENT (OUTPATIENT)
Age: 85
End: 2025-06-30

## 2025-07-02 ENCOUNTER — LABORATORY RESULT (OUTPATIENT)
Age: 85
End: 2025-07-02

## 2025-07-02 ENCOUNTER — APPOINTMENT (OUTPATIENT)
Dept: INTERNAL MEDICINE | Facility: CLINIC | Age: 85
End: 2025-07-02
Payer: MEDICARE

## 2025-07-02 VITALS
SYSTOLIC BLOOD PRESSURE: 102 MMHG | HEIGHT: 66 IN | DIASTOLIC BLOOD PRESSURE: 59 MMHG | OXYGEN SATURATION: 97 % | BODY MASS INDEX: 26.68 KG/M2 | WEIGHT: 166 LBS | TEMPERATURE: 97.2 F | HEART RATE: 57 BPM

## 2025-07-02 PROBLEM — L98.9 FACIAL SKIN LESION: Status: ACTIVE | Noted: 2025-07-02

## 2025-07-02 PROCEDURE — G0439: CPT

## 2025-07-02 PROCEDURE — 36415 COLL VENOUS BLD VENIPUNCTURE: CPT

## 2025-07-09 LAB
25(OH)D3 SERPL-MCNC: 41.9 NG/ML
ALBUMIN SERPL ELPH-MCNC: 4.1 G/DL
ALP BLD-CCNC: 71 U/L
ALT SERPL-CCNC: 15 U/L
ANION GAP SERPL CALC-SCNC: 19 MMOL/L
APPEARANCE: CLEAR
AST SERPL-CCNC: 17 U/L
BILIRUB SERPL-MCNC: 0.5 MG/DL
BILIRUBIN URINE: NEGATIVE
BLOOD URINE: ABNORMAL
BUN SERPL-MCNC: 103 MG/DL
CALCIUM SERPL-MCNC: 9.7 MG/DL
CHLORIDE SERPL-SCNC: 87 MMOL/L
CHOLEST SERPL-MCNC: 75 MG/DL
CO2 SERPL-SCNC: 33 MMOL/L
COLOR: YELLOW
CREAT SERPL-MCNC: 2.84 MG/DL
EGFRCR SERPLBLD CKD-EPI 2021: 21 ML/MIN/1.73M2
ESTIMATED AVERAGE GLUCOSE: 151 MG/DL
GLUCOSE QUALITATIVE U: NEGATIVE MG/DL
GLUCOSE SERPL-MCNC: 122 MG/DL
HBA1C MFR BLD HPLC: 6.9 %
HCT VFR BLD CALC: 33 %
HDLC SERPL-MCNC: 29 MG/DL
HGB BLD-MCNC: 9.9 G/DL
KETONES URINE: NEGATIVE MG/DL
LDLC SERPL-MCNC: 26 MG/DL
LEUKOCYTE ESTERASE URINE: NEGATIVE
MCHC RBC-ENTMCNC: 27.5 PG
MCHC RBC-ENTMCNC: 30 G/DL
MCV RBC AUTO: 91.7 FL
NITRITE URINE: NEGATIVE
NONHDLC SERPL-MCNC: 46 MG/DL
NT-PROBNP SERPL-MCNC: 7109 PG/ML
PH URINE: 6.5
PLATELET # BLD AUTO: 120 K/UL
POTASSIUM SERPL-SCNC: 4.3 MMOL/L
PROT SERPL-MCNC: 6.7 G/DL
PROTEIN URINE: NORMAL MG/DL
RBC # BLD: 3.6 M/UL
RBC # FLD: 17.4 %
SODIUM SERPL-SCNC: 138 MMOL/L
SPECIFIC GRAVITY URINE: 1.01
TRIGL SERPL-MCNC: 101 MG/DL
TSH SERPL-ACNC: 2.78 UIU/ML
UROBILINOGEN URINE: 0.2 MG/DL
VIT B12 SERPL-MCNC: 529 PG/ML
WBC # FLD AUTO: 7.18 K/UL

## 2025-07-18 ENCOUNTER — TRANSCRIPTION ENCOUNTER (OUTPATIENT)
Age: 85
End: 2025-07-18

## 2025-07-21 ENCOUNTER — APPOINTMENT (OUTPATIENT)
Dept: PULMONOLOGY | Facility: CLINIC | Age: 85
End: 2025-07-21
Payer: MEDICARE

## 2025-07-21 ENCOUNTER — RX RENEWAL (OUTPATIENT)
Age: 85
End: 2025-07-21

## 2025-07-21 VITALS
BODY MASS INDEX: 26.68 KG/M2 | DIASTOLIC BLOOD PRESSURE: 60 MMHG | RESPIRATION RATE: 22 BRPM | OXYGEN SATURATION: 95 % | HEART RATE: 63 BPM | HEIGHT: 66 IN | WEIGHT: 166 LBS | SYSTOLIC BLOOD PRESSURE: 96 MMHG | TEMPERATURE: 97.6 F

## 2025-07-21 DIAGNOSIS — G47.33 OBSTRUCTIVE SLEEP APNEA (ADULT) (PEDIATRIC): ICD-10-CM

## 2025-07-21 DIAGNOSIS — J44.9 CHRONIC OBSTRUCTIVE PULMONARY DISEASE, UNSPECIFIED: ICD-10-CM

## 2025-07-21 PROCEDURE — 99214 OFFICE O/P EST MOD 30 MIN: CPT

## 2025-07-24 ENCOUNTER — APPOINTMENT (OUTPATIENT)
Dept: CARDIOLOGY | Facility: CLINIC | Age: 85
End: 2025-07-24
Payer: MEDICARE

## 2025-07-24 ENCOUNTER — NON-APPOINTMENT (OUTPATIENT)
Age: 85
End: 2025-07-24

## 2025-07-24 VITALS
DIASTOLIC BLOOD PRESSURE: 59 MMHG | HEIGHT: 66 IN | SYSTOLIC BLOOD PRESSURE: 93 MMHG | BODY MASS INDEX: 27.16 KG/M2 | TEMPERATURE: 97.7 F | OXYGEN SATURATION: 93 % | HEART RATE: 68 BPM | RESPIRATION RATE: 18 BRPM | WEIGHT: 169 LBS

## 2025-07-24 DIAGNOSIS — I10 ESSENTIAL (PRIMARY) HYPERTENSION: ICD-10-CM

## 2025-07-24 DIAGNOSIS — I48.20 CHRONIC ATRIAL FIBRILLATION, UNSP: ICD-10-CM

## 2025-07-24 DIAGNOSIS — L03.90 CELLULITIS, UNSPECIFIED: ICD-10-CM

## 2025-07-24 PROCEDURE — 93000 ELECTROCARDIOGRAM COMPLETE: CPT

## 2025-07-24 PROCEDURE — 99214 OFFICE O/P EST MOD 30 MIN: CPT

## 2025-07-24 PROCEDURE — G2211 COMPLEX E/M VISIT ADD ON: CPT

## 2025-09-15 ENCOUNTER — RX RENEWAL (OUTPATIENT)
Age: 85
End: 2025-09-15

## 2025-09-19 ENCOUNTER — RX RENEWAL (OUTPATIENT)
Age: 85
End: 2025-09-19

## (undated) DEVICE — SPECIMEN CONTAINER 100ML

## (undated) DEVICE — SYR LUER LOK 20CC

## (undated) DEVICE — GLV 8 PROTEXIS (WHITE)

## (undated) DEVICE — WARMING BLANKET UPPER ADULT

## (undated) DEVICE — GLV 8.5 PROTEXIS (WHITE)

## (undated) DEVICE — GLV 7.5 PROTEXIS (WHITE)

## (undated) DEVICE — SUT POLYSORB 2-0 30" GS-21 UNDYED

## (undated) DEVICE — SOL IRR POUR H2O 1000ML

## (undated) DEVICE — ELCTR BOVIE TIP BLADE INSULATED 6.5" EDGE

## (undated) DEVICE — ELCTR BOVIE PENCIL HANDPIECE ROCKER SWITCH 15FT

## (undated) DEVICE — DRSG AQUACEL 3.5 X 6"

## (undated) DEVICE — NDL HYPO SAFE 22G X 1.5" (BLACK)

## (undated) DEVICE — DRAPE 3/4 SHEET W REINFORCEMENT 56X77"

## (undated) DEVICE — DRSG DERMABOND 0.7ML

## (undated) DEVICE — SUT MONOCRYL 3-0 18" PS-2 UNDYED

## (undated) DEVICE — HOOD FLYTE STRYKER HELMET SHIELD

## (undated) DEVICE — SOL INJ NS 0.9% 500ML 1-PORT

## (undated) DEVICE — PACK TOTAL HIP (2 PACKS)

## (undated) DEVICE — WOUND IRR IRRISEPT W 0.5 CHG

## (undated) DEVICE — SAW BLADE STRYKER SAGITTAL 3 HOLE OSCILLATING

## (undated) DEVICE — POSITIONER FOAM EGG CRATE ULNAR 2PCS (PINK)

## (undated) DEVICE — ELCTR AQUAMANTYS BIPOLAR SEALER 6.0

## (undated) DEVICE — DRAPE C ARM UNIVERSAL

## (undated) DEVICE — DRAPE SHOWER CURTAIN ISOLATION

## (undated) DEVICE — SOL IRR POUR NS 0.9% 500ML

## (undated) DEVICE — LINER TRACTION BOOT MIZUHO

## (undated) DEVICE — VENODYNE/SCD SLEEVE CALF LARGE

## (undated) DEVICE — SUT QUILL PDO 1 30CM T9 DA